# Patient Record
Sex: MALE | Race: WHITE | NOT HISPANIC OR LATINO | Employment: OTHER | URBAN - METROPOLITAN AREA
[De-identification: names, ages, dates, MRNs, and addresses within clinical notes are randomized per-mention and may not be internally consistent; named-entity substitution may affect disease eponyms.]

---

## 2019-09-03 ENCOUNTER — TRANSFERRED RECORDS (OUTPATIENT)
Dept: HEALTH INFORMATION MANAGEMENT | Facility: CLINIC | Age: 74
End: 2019-09-03

## 2019-10-11 ENCOUNTER — TRANSFERRED RECORDS (OUTPATIENT)
Dept: HEALTH INFORMATION MANAGEMENT | Facility: CLINIC | Age: 74
End: 2019-10-11

## 2020-10-08 ENCOUNTER — TRANSFERRED RECORDS (OUTPATIENT)
Dept: HEALTH INFORMATION MANAGEMENT | Facility: CLINIC | Age: 75
End: 2020-10-08

## 2021-03-23 ENCOUNTER — TRANSFERRED RECORDS (OUTPATIENT)
Dept: HEALTH INFORMATION MANAGEMENT | Facility: CLINIC | Age: 76
End: 2021-03-23

## 2021-04-13 ENCOUNTER — TRANSFERRED RECORDS (OUTPATIENT)
Dept: HEALTH INFORMATION MANAGEMENT | Facility: CLINIC | Age: 76
End: 2021-04-13

## 2021-04-21 ENCOUNTER — MEDICAL CORRESPONDENCE (OUTPATIENT)
Dept: HEALTH INFORMATION MANAGEMENT | Facility: CLINIC | Age: 76
End: 2021-04-21

## 2021-04-22 ENCOUNTER — TRANSCRIBE ORDERS (OUTPATIENT)
Dept: OTHER | Age: 76
End: 2021-04-22

## 2021-04-22 DIAGNOSIS — K63.5 POLYP OF ASCENDING COLON: Primary | ICD-10-CM

## 2021-04-26 LAB
ALT SERPL-CCNC: 53 U/L (ref 18–65)
AST SERPL-CCNC: 26 U/L (ref 10–40)
CREAT SERPL-MCNC: 1.3 MG/DL (ref 0.8–1.5)
GLUCOSE SERPL-MCNC: 231 MG/DL (ref 60–99)
POTASSIUM SERPL-SCNC: 4.1 MMOL/L (ref 3.5–5.1)

## 2021-04-27 NOTE — TELEPHONE ENCOUNTER
RECORDS RECEIVED FROM: External  Appt notes: Referred by Angelique Padilla at [facility] for polyp of ascending colon. Appt per Pt.  Images pushed   DATE RECEIVED: 6.2.21   NOTES STATUS DETAILS   OFFICE NOTE from referring provider  In process Angelique Padilla, [facility]  4.13.21   OFFICE NOTE from other specialist   Received 2.24.21 [facility]   DISCHARGE SUMMARY from hospital  N/A    DISCHARGE REPORT from the ER N/A    OPERATIVE REPORT  Received 10.11.19 [facility]   MEDICATION LIST N/A    LABS     PFC TESTING N/A    ANAL PAP N/A    BIOPSIES/PATHOLOGY RELATED TO DIAGNOSIS Received 3.23.21 [facility]  10.8.20 [facility]   DIAGNOSTIC PROCEDURES     COLONOSCOPY Received 10.8.20 [facility]   UPPER ENDOSCOPY (EGD) Received 9.3.19 [facility]   FLEX SIGMOIDOSCOPY  N/A    ERCP N/A    IMAGING (DISC & REPORT)      CT  Received 4.28.21 chest ab pelvis, [facility]  9.3.19 chest ab pelvis, [facility]   MRI N/A    XRAY N/A    ULTRASOUND (ENDOANAL/ENDORECTAL) N/A      Action 4.27.21 MJ   Action Taken Requested all C&R records and images. No images were pushed. [facility] fax number and sent request to [phone]     Action 5.26.21 MJ   Action Taken GI records came from [facility]- updated [employer]. Images pulled into PACS.  In note, stated pt has history of colon cancer. Sent request to [facility] for all colon cancer notes.      Action 6.1.21 MJ   Action Taken Sent URGENT request for all notes related to colon cancer.

## 2021-04-28 ENCOUNTER — TRANSFERRED RECORDS (OUTPATIENT)
Dept: HEALTH INFORMATION MANAGEMENT | Facility: CLINIC | Age: 76
End: 2021-04-28

## 2021-05-05 ENCOUNTER — TRANSFERRED RECORDS (OUTPATIENT)
Dept: HEALTH INFORMATION MANAGEMENT | Facility: CLINIC | Age: 76
End: 2021-05-05

## 2021-05-05 LAB
CHOLEST SERPL-MCNC: 143 MG/DL
HBA1C MFR BLD: 8.9 % (ref 0–5.7)
HDLC SERPL-MCNC: 34 MG/DL
LDLC SERPL CALC-MCNC: 67 MG/DL
TRIGL SERPL-MCNC: 211 MG/DL

## 2021-05-31 NOTE — PROGRESS NOTES
Colon and Rectal Surgery Clinic Note    RE: Da Amaya.  : 1945.  AMILCAR: 2021.    Reason for visit: Endoscopically unresectable Large flat ascending/transverse colon polyp    HPI: 77 yo M with previous hx of ascending colon cancer s/p lap RHC (10/11/2019), seemingly uncomplicated, side to side functional end to end anastomosis. Final path showed pT2N0 (0/23 LNs +).  In 10/20, underwent colonoscopy which revealed a large sessile polyp in the distal descending colon (final path tubular adenoma with focal HGD), resected. Colonoscopy was repeated in 21 which revealed sessile polyps in the descending and transverse colon (resected), and a large unresectable polyp in the ascending colon, endoscopically unresectable (final path all TVA).     He has also had EGDs during this time concomitantly with his colonoscopies, each time also yielding a tubular adenoma in D2, along with GERD and erosive esophagitis. 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    He never had a previous colonoscopy prior to the one in  which showed cancer.     Main symptoms have included diarrhea, was recommended fiber, imodium prn, and protonix. CT CAP was also performed on 21 which revealed no evidence of metastatic disease.    It was recommended he also be evaluated by genetic counselor given his polyp burden and recurrence.     Medical history:  HTN  Hx of tobacco abuse  Cataracts  Elevated PSA  St 3 renal insufficiency  History of MI  Hyperlipidemia  T2DM    Surgical history:  CABx3  Laparoscopic R hemicolectomy 10/11/19  Tonsillectomy  Vasectomy  Appendectomy    Family history:  No family history on file.  Orphan, unknown history     Medications:  Amlodipine  ASA 81 mg  Atorvastatin  Glipizide  Losartan/HCTZ  Metformin  Metoprolol  Pantoprazole  Tamsulosin      Allergies:  Not on File    Social history:   Social History     Tobacco Use     Smoking status: Not on file   Substance Use Topics     Alcohol use: Not on file  "  Former smoker  Occasional EtOH    Marital status: single.    ROS:  A complete review of systems was performed with the patient and all systems negative except as per HPI.    Physical Examination:  BP (!) 153/81 (BP Location: Left arm, Patient Position: Sitting, Cuff Size: Adult Regular)   Pulse 60   Temp 97.8  F (36.6  C) (Oral)   Ht 1.727 m (5' 8\")   Wt 95.3 kg (210 lb 1.6 oz)   SpO2 97%   BMI 31.95 kg/m    General: Well hydrated. No acute distress.  Abdomen: Soft, NT, nondistended. Midline incision well healed, RLQ McBurney from appy well healed.    Laboratory values reviewed:  Recent Labs   Lab Test 04/26/21   CR 1.30   ALT 53   AST 26       Imaging personally reviewed by me:  CT CAP 4/21 without evidence of malignancy.    ASSESSMENT  75 yo M with hx of pT2N0 ascending colon cancer s/p lap RHC, now with recurrence of polyps on surveillance in last 8 months. I am concerned about the presence of a tubular adenoma in the sigmoid colon with showed HGD In October '20's colonoscopy, and of course most recently  The unresectable large centrally depressed polyp in the ascending/proximal transverse (?) colon. Given the number and recurrence of polyps, he may benefit from at least a completion ascending/transverse colectomy, but perhaps more. However, it seems that at least the polyps have been amenable to endoscopic resection with the exception of the ascending colon polyp. The other important aspect to consider is functionality with an ileosigmoid anastomosis, with the increased frequency of bowel movements, which he is already having.    PLAN: Laparoscopic possible open resection of ileocolic/completion transverse colectomy.  1. Complete workup with CEA.   2. Medical oncology referral.  3. Genetic evaluation with counselor.  4. Discussion at MDT thereafter  5. Endocrinology evaluation for diabetes control.    OR Prep  1. Preoperative labs: CBC, CMP, PTT/INR, Prealbumin.  2. Mechanical bowel prep with oral " antibiotics.  3. Hold these medications prior to surgery: Amlodipine, ASA 81 mg, Atorvastatin, Glipizide, Losartan/HCTZ, Metformin  4. Advised patient to begin protein shakes: Premier or Pure protein given high protein, low carb ratio for pre-operative rehab.      Risks, benefits, and alternatives of operative treatment were thoroughly discussed with the patient, he understands these well and agrees to proceed.    Time spent: 45 minutes.  >50% spent in discussing, counseling and coordinating care.    Oscar Muller M.D    Division of Colon and Rectal Surgery  [facility]    Referring Provider:  Wm Humphrey MD  [facility] GASTROENTEROLOGY  [address] [place] [address]     Primary Care Provider:  Toro Worley

## 2021-06-02 ENCOUNTER — PRE VISIT (OUTPATIENT)
Dept: SURGERY | Facility: CLINIC | Age: 76
End: 2021-06-02

## 2021-06-02 ENCOUNTER — OFFICE VISIT (OUTPATIENT)
Dept: SURGERY | Facility: CLINIC | Age: 76
End: 2021-06-02
Attending: INTERNAL MEDICINE
Payer: MEDICARE

## 2021-06-02 ENCOUNTER — TELEPHONE (OUTPATIENT)
Dept: SURGERY | Facility: CLINIC | Age: 76
End: 2021-06-02

## 2021-06-02 VITALS
SYSTOLIC BLOOD PRESSURE: 153 MMHG | DIASTOLIC BLOOD PRESSURE: 81 MMHG | WEIGHT: 210.1 LBS | TEMPERATURE: 97.8 F | HEART RATE: 60 BPM | OXYGEN SATURATION: 97 % | HEIGHT: 68 IN | BODY MASS INDEX: 31.84 KG/M2

## 2021-06-02 DIAGNOSIS — Z86.0100 HISTORY OF COLONIC POLYPS: Primary | ICD-10-CM

## 2021-06-02 DIAGNOSIS — E11.9 TYPE 2 DIABETES MELLITUS WITHOUT COMPLICATION, WITHOUT LONG-TERM CURRENT USE OF INSULIN (H): Primary | ICD-10-CM

## 2021-06-02 DIAGNOSIS — E11.9 TYPE 2 DIABETES MELLITUS WITHOUT COMPLICATION, WITHOUT LONG-TERM CURRENT USE OF INSULIN (H): ICD-10-CM

## 2021-06-02 DIAGNOSIS — C18.2 MALIGNANT NEOPLASM OF ASCENDING COLON (H): ICD-10-CM

## 2021-06-02 DIAGNOSIS — C18.9 COLON CANCER (H): Primary | ICD-10-CM

## 2021-06-02 DIAGNOSIS — Z86.0100 HISTORY OF COLONIC POLYPS: ICD-10-CM

## 2021-06-02 LAB
ALBUMIN SERPL-MCNC: 3.8 G/DL (ref 3.4–5)
ALP SERPL-CCNC: 105 U/L (ref 40–150)
ALT SERPL W P-5'-P-CCNC: 43 U/L (ref 0–70)
ANION GAP SERPL CALCULATED.3IONS-SCNC: 9 MMOL/L (ref 3–14)
AST SERPL W P-5'-P-CCNC: 18 U/L (ref 0–45)
BASOPHILS # BLD AUTO: 0.1 10E9/L (ref 0–0.2)
BASOPHILS NFR BLD AUTO: 1 %
BILIRUB SERPL-MCNC: 0.6 MG/DL (ref 0.2–1.3)
BUN SERPL-MCNC: 28 MG/DL (ref 7–30)
CALCIUM SERPL-MCNC: 9.2 MG/DL (ref 8.5–10.1)
CEA SERPL-MCNC: 1.7 UG/L (ref 0–2.5)
CHLORIDE SERPL-SCNC: 101 MMOL/L (ref 94–109)
CO2 SERPL-SCNC: 28 MMOL/L (ref 20–32)
CREAT SERPL-MCNC: 1.48 MG/DL (ref 0.66–1.25)
DIFFERENTIAL METHOD BLD: NORMAL
EOSINOPHIL # BLD AUTO: 0.1 10E9/L (ref 0–0.7)
EOSINOPHIL NFR BLD AUTO: 2 %
ERYTHROCYTE [DISTWIDTH] IN BLOOD BY AUTOMATED COUNT: 14 % (ref 10–15)
GFR SERPL CREATININE-BSD FRML MDRD: 46 ML/MIN/{1.73_M2}
GLUCOSE SERPL-MCNC: 160 MG/DL (ref 70–99)
HCT VFR BLD AUTO: 41.9 % (ref 40–53)
HGB BLD-MCNC: 13.6 G/DL (ref 13.3–17.7)
IMM GRANULOCYTES # BLD: 0 10E9/L (ref 0–0.4)
IMM GRANULOCYTES NFR BLD: 0.3 %
LYMPHOCYTES # BLD AUTO: 1.9 10E9/L (ref 0.8–5.3)
LYMPHOCYTES NFR BLD AUTO: 26.4 %
MCH RBC QN AUTO: 26.5 PG (ref 26.5–33)
MCHC RBC AUTO-ENTMCNC: 32.5 G/DL (ref 31.5–36.5)
MCV RBC AUTO: 82 FL (ref 78–100)
MONOCYTES # BLD AUTO: 0.9 10E9/L (ref 0–1.3)
MONOCYTES NFR BLD AUTO: 12.4 %
NEUTROPHILS # BLD AUTO: 4.1 10E9/L (ref 1.6–8.3)
NEUTROPHILS NFR BLD AUTO: 57.9 %
NRBC # BLD AUTO: 0 10*3/UL
NRBC BLD AUTO-RTO: 0 /100
PLATELET # BLD AUTO: 199 10E9/L (ref 150–450)
POTASSIUM SERPL-SCNC: 3.7 MMOL/L (ref 3.4–5.3)
PREALB SERPL IA-MCNC: 27 MG/DL (ref 15–45)
PROT SERPL-MCNC: 7.6 G/DL (ref 6.8–8.8)
RBC # BLD AUTO: 5.14 10E12/L (ref 4.4–5.9)
SODIUM SERPL-SCNC: 138 MMOL/L (ref 133–144)
WBC # BLD AUTO: 7 10E9/L (ref 4–11)

## 2021-06-02 PROCEDURE — 99204 OFFICE O/P NEW MOD 45 MIN: CPT | Performed by: SURGERY

## 2021-06-02 PROCEDURE — 80053 COMPREHEN METABOLIC PANEL: CPT | Performed by: PATHOLOGY

## 2021-06-02 PROCEDURE — 84134 ASSAY OF PREALBUMIN: CPT | Performed by: PATHOLOGY

## 2021-06-02 PROCEDURE — 36415 COLL VENOUS BLD VENIPUNCTURE: CPT | Performed by: PATHOLOGY

## 2021-06-02 PROCEDURE — 85025 COMPLETE CBC W/AUTO DIFF WBC: CPT | Performed by: PATHOLOGY

## 2021-06-02 RX ORDER — PANTOPRAZOLE SODIUM 40 MG/1
40 TABLET, DELAYED RELEASE ORAL DAILY
COMMUNITY
Start: 2021-05-21

## 2021-06-02 RX ORDER — ONDANSETRON 4 MG/1
4 TABLET, FILM COATED ORAL EVERY 6 HOURS
Qty: 3 TABLET | Refills: 0 | Status: ON HOLD | OUTPATIENT
Start: 2021-06-02 | End: 2021-06-23

## 2021-06-02 RX ORDER — AMLODIPINE BESYLATE 10 MG/1
10 TABLET ORAL DAILY
Status: ON HOLD | COMMUNITY
Start: 2020-07-31 | End: 2021-07-02

## 2021-06-02 RX ORDER — LOSARTAN POTASSIUM AND HYDROCHLOROTHIAZIDE 25; 100 MG/1; MG/1
1 TABLET ORAL EVERY EVENING
COMMUNITY
Start: 2021-05-13

## 2021-06-02 RX ORDER — GLIPIZIDE 10 MG/1
10 TABLET ORAL
COMMUNITY
Start: 2021-04-25

## 2021-06-02 RX ORDER — NEOMYCIN SULFATE 500 MG/1
1000 TABLET ORAL EVERY 6 HOURS
Qty: 6 TABLET | Refills: 0 | Status: ON HOLD | OUTPATIENT
Start: 2021-06-02 | End: 2021-06-23

## 2021-06-02 RX ORDER — TAMSULOSIN HYDROCHLORIDE 0.4 MG/1
0.4 CAPSULE ORAL DAILY
COMMUNITY
Start: 2021-04-30

## 2021-06-02 RX ORDER — METRONIDAZOLE 500 MG/1
500 TABLET ORAL EVERY 6 HOURS
Qty: 3 TABLET | Refills: 0 | Status: ON HOLD | OUTPATIENT
Start: 2021-06-02 | End: 2021-06-23

## 2021-06-02 RX ORDER — METOPROLOL SUCCINATE 200 MG/1
200 TABLET, EXTENDED RELEASE ORAL DAILY
COMMUNITY
Start: 2021-04-30

## 2021-06-02 RX ORDER — ATORVASTATIN CALCIUM 40 MG/1
40 TABLET, FILM COATED ORAL DAILY
COMMUNITY
Start: 2021-04-30

## 2021-06-02 RX ORDER — POLYETHYLENE GLYCOL 3350 17 G/17G
238 POWDER, FOR SOLUTION ORAL SEE ADMIN INSTRUCTIONS
Qty: 14 PACKET | Refills: 0 | Status: ON HOLD | OUTPATIENT
Start: 2021-06-02 | End: 2021-06-23

## 2021-06-02 SDOH — HEALTH STABILITY: MENTAL HEALTH: HOW OFTEN DO YOU HAVE 6 OR MORE DRINKS ON ONE OCCASION?: NOT ASKED

## 2021-06-02 SDOH — HEALTH STABILITY: MENTAL HEALTH: HOW MANY STANDARD DRINKS CONTAINING ALCOHOL DO YOU HAVE ON A TYPICAL DAY?: NOT ASKED

## 2021-06-02 SDOH — HEALTH STABILITY: MENTAL HEALTH: HOW OFTEN DO YOU HAVE A DRINK CONTAINING ALCOHOL?: MONTHLY OR LESS

## 2021-06-02 ASSESSMENT — PAIN SCALES - GENERAL: PAINLEVEL: NO PAIN (0)

## 2021-06-02 ASSESSMENT — MIFFLIN-ST. JEOR: SCORE: 1667.51

## 2021-06-02 NOTE — PATIENT INSTRUCTIONS
Follow up:    1. Rhiannon will call you to schedule your surgery. If you do not hear from her within three business days please reach out to her.     2. Appointments you will need:   -COVID test  -pre op physical     3. You will need to do a full bowel prep with antibiotics the day before surgery. You will receive a surgery packet in the mail with the instructions.       EZIO Kimble [phone]        Surgery Scheduling [phone]    .

## 2021-06-02 NOTE — LETTER
2021       RE: Da Amaya  [address]  [address]  [facility] [address]     Dear Colleague,    Thank you for referring your patient, Da Amaya, to the [facility] [place] [facility]. Please see a copy of my visit note below.    Colon and Rectal Surgery Clinic Note    RE: Da Amaya.  : 1945.  AMILCAR: 2021.    Reason for visit: Endoscopically unresectable Large flat ascending/transverse colon polyp    HPI: 77 yo M with previous hx of ascending colon cancer s/p lap RHC (10/11/2019), seemingly uncomplicated, side to side functional end to end anastomosis. Final path showed pT2N0 (0/23 LNs +).  In 10/20, underwent colonoscopy which revealed a large sessile polyp in the distal descending colon (final path tubular adenoma with focal HGD), resected. Colonoscopy was repeated in 21 which revealed sessile polyps in the descending and transverse colon (resected), and a large unresectable polyp in the ascending colon, endoscopically unresectable (final path all TVA).     He has also had EGDs during this time concomitantly with his colonoscopies, each time also yielding a tubular adenoma in D2, along with GERD and erosive esophagitis. 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    He never had a previous colonoscopy prior to the one in  which showed cancer.     Main symptoms have included diarrhea, was recommended fiber, imodium prn, and protonix. CT CAP was also performed on 21 which revealed no evidence of metastatic disease.    It was recommended he also be evaluated by genetic counselor given his polyp burden and recurrence.     Medical history:  HTN  Hx of tobacco abuse  Cataracts  Elevated PSA  St 3 renal insufficiency  History of MI  Hyperlipidemia  T2DM    Surgical history:  CABx3  Laparoscopic R hemicolectomy 10/11/19  Tonsillectomy  Vasectomy  Appendectomy    Family history:  No family  "history on file.  Orphan, unknown history     Medications:  Amlodipine  ASA 81 mg  Atorvastatin  Glipizide  Losartan/HCTZ  Metformin  Metoprolol  Pantoprazole  Tamsulosin      Allergies:  Not on File    Social history:   Social History     Tobacco Use     Smoking status: Not on file   Substance Use Topics     Alcohol use: Not on file   Former smoker  Occasional EtOH    Marital status: single.    ROS:  A complete review of systems was performed with the patient and all systems negative except as per HPI.    Physical Examination:  BP (!) 153/81 (BP Location: Left arm, Patient Position: Sitting, Cuff Size: Adult Regular)   Pulse 60   Temp 97.8  F (36.6  C) (Oral)   Ht 1.727 m (5' 8\")   Wt 95.3 kg (210 lb 1.6 oz)   SpO2 97%   BMI 31.95 kg/m    General: Well hydrated. No acute distress.  Abdomen: Soft, NT, nondistended. Midline incision well healed, RLQ McBurney from appy well healed.    Laboratory values reviewed:  Recent Labs   Lab Test 04/26/21   CR 1.30   ALT 53   AST 26       Imaging personally reviewed by me:  CT CAP 4/21 without evidence of malignancy.    ASSESSMENT  77 yo M with hx of pT2N0 ascending colon cancer s/p lap RHC, now with recurrence of polyps on surveillance in last 8 months. I am concerned about the presence of a tubular adenoma in the sigmoid colon with showed HGD In October '20's colonoscopy, and of course most recently  The unresectable large centrally depressed polyp in the ascending/proximal transverse (?) colon. Given the number and recurrence of polyps, he may benefit from at least a completion ascending/transverse colectomy, but perhaps more. However, it seems that at least the polyps have been amenable to endoscopic resection with the exception of the ascending colon polyp. The other important aspect to consider is functionality with an ileosigmoid anastomosis, with the increased frequency of bowel movements, which he is already having.    PLAN: Laparoscopic possible open resection of " ileocolic/completion transverse colectomy.  1. Complete workup with CEA.   2. Medical oncology referral.  3. Genetic evaluation with counselor.  4. Discussion at MDT thereafter  5. Endocrinology evaluation for diabetes control.    OR Prep  1. Preoperative labs: CBC, CMP, PTT/INR, Prealbumin.  2. Mechanical bowel prep with oral antibiotics.  3. Hold these medications prior to surgery: Amlodipine, ASA 81 mg, Atorvastatin, Glipizide, Losartan/HCTZ, Metformin  4. Advised patient to begin protein shakes: Premier or Pure protein given high protein, low carb ratio for pre-operative rehab.      Risks, benefits, and alternatives of operative treatment were thoroughly discussed with the patient, he understands these well and agrees to proceed.    Time spent: 45 minutes.  >50% spent in discussing, counseling and coordinating care.    Oscar Muller M.D    Division of Colon and Rectal Surgery  [facility]    Referring Provider:  Wm Humphrey MD  [facility] GASTROENTEROLOG[facility]  [address] [place] [id number]     Primary Care Provider:  Toro Worley

## 2021-06-02 NOTE — TELEPHONE ENCOUNTER
Told Sd that patient is allergic to neosporin and Dr. Muller felt that patient would still be able to take neomycin.

## 2021-06-02 NOTE — TELEPHONE ENCOUNTER
PRIOR AUTHORIZATION DENIED    Medication: ZOFRAN 4 MG TAB - DENIED    Denial Date: 6/2/2021    Denial Rational: NOT COVERED FOR DX    Appeal Information: FORM ON DENIAL LETTER

## 2021-06-02 NOTE — TELEPHONE ENCOUNTER
Health Call Center    Phone Message    May a detailed message be left on voicemail: yes     Reason for Call: Medication Question or concern regarding medication   Prescription Clarification  Name of Medication: neomycin (MYCIFRADIN) 500 MG tablet  Prescribing Provider: Oscar Muller   Pharmacy: [id number] - [place]   What on the order needs clarification? Sd from [facility] is calling in about the prescription from Dr. Muller, because he has the patient flagged as being allergic to neomycin. Please call back to discuss.          Action Taken: Message routed to:  [facility] ([facility] Colon/Rectal    Travel Screening: Not Applicable

## 2021-06-03 ENCOUNTER — TELEPHONE (OUTPATIENT)
Dept: ENDOCRINOLOGY | Facility: CLINIC | Age: 76
End: 2021-06-03

## 2021-06-03 ENCOUNTER — DOCUMENTATION ONLY (OUTPATIENT)
Dept: SURGERY | Facility: CLINIC | Age: 76
End: 2021-06-03

## 2021-06-03 DIAGNOSIS — C18.9 COLON CANCER (H): Primary | ICD-10-CM

## 2021-06-03 NOTE — PROGRESS NOTES
Action 06/03/2021 JTV 10:53AM   Action Taken AFIA called patient and confirmed that he was seen at [facility]. Patient gave verbal consent to obtain any medical records.     Per Provider's request, AFIA reached out to [facility]'s HIM team and spoke to Aliya regarding pathology slides and all records pertaining to colon cancer. Aliya states that they received a request but it was blank. Fax number provided by Aliya is [phone]. Urgent request and shipping label was sent to fax number that was provided.     Tracking number: [id number]     Action 06/04/2021 JTV 10:09AM   Action Cathy OREILLY received Medical Record Reports from [facility] through fax. Imaging department is having issues and cannot be attached to patient's chart yet. E-mail with medical records was forwarded  to Nurse Kimble in Colon and rectal. AFIA is still waiting for Pathology slides from [facility].     Action 06/08/2021 jTV 3:59PM   Action Cathy OREILLY checked tracking number for [employer] and the pathology slides from [facility] should be in by Wednesday 06/09/2021 night.    Tracking number: [id number]

## 2021-06-03 NOTE — TELEPHONE ENCOUNTER
[facility] Call Center    Phone Message    May a detailed message be left on voicemail: yes     Reason for Call: Other: Patient is being referred by Dr Muller to be seen for type 2 DM. Per referral, last a1c was 9- we would like his diabetes under control in prep for colon surgery. Writer contacted patient to schedule but patient stated that he will be in the building on 7/5/21 at 1:00pm. Patient wondering if Endocrinology would be able to see patient on 7/5/21 as he will already be in the building. Sending encounter to clinic per patient's request. Please review and call patient to schedule.     Action Taken: Message routed to:  [facility] ([facility] Endocrinology    Travel Screening: Not Applicable

## 2021-06-07 ENCOUNTER — TELEPHONE (OUTPATIENT)
Dept: SURGERY | Facility: CLINIC | Age: 76
End: 2021-06-07

## 2021-06-07 ENCOUNTER — TUMOR CONFERENCE (OUTPATIENT)
Dept: ONCOLOGY | Facility: CLINIC | Age: 76
End: 2021-06-07
Payer: MEDICARE

## 2021-06-07 DIAGNOSIS — Z11.59 ENCOUNTER FOR SCREENING FOR OTHER VIRAL DISEASES: ICD-10-CM

## 2021-06-07 PROBLEM — C18.9 COLON CANCER (H): Status: ACTIVE | Noted: 2021-06-07

## 2021-06-07 NOTE — TELEPHONE ENCOUNTER
RECORDS STATUS - ALL OTHER DIAGNOSIS      RECORDS RECEIVED FROM: Malignant neoplasm of ascending colon ref Dr. Muller   DATE RECEIVED: 7.13.21   NOTES STATUS DETAILS   OFFICE NOTE from referring provider Internal 6.2.21 Dr Oscar Muller, [facility] Colon Surg   OFFICE NOTE from medical oncologist Received 4.13.21 Angelique Padilla [facility]  2.24.21 [facility]   DISCHARGE SUMMARY from hospital     DISCHARGE REPORT from the ER     OPERATIVE REPORT Received 10.8.20 [facility] - Colonoscopy  10.11.19 [facility]   MEDICATION LIST Internal    CLINICAL TRIAL TREATMENTS TO DATE     LABS     PATHOLOGY REPORTS Received 3.23.21 [facility]  10.8.20 [facility]   ANYTHING RELATED TO DIAGNOSIS     GENONOMIC TESTING     TYPE:     IMAGING (NEED IMAGES & REPORT)     CT SCANS Received 4.28.21 chest ab pelvis, [facility]  9.3.19 chest ab pelvis, [facility]   MRI     MAMMO     ULTRASOUND     PET

## 2021-06-07 NOTE — PROGRESS NOTES
Tumor Conference Information  Tumor Conference: Colorectal  Specialties Present: Medical oncology, Radiation oncology, Radiology, Surgery  Patient Status: A new patient  Pathology: Not Discussed  Treatment to Date: None  Clinical Trial Eligibility: Not discussed  Recommended Plan: Surgery, Other (see comment) (Comment: ADRIANA)  Did the review exceed 30 minutes?: did not           Documentation / Disclaimer Cancer Tumor Board Note  Cancer tumor board recommendations do not override what is determined to be reasonable care and treatment, which is dependent on the circumstances of a patient's case; the patient's medical, social, and personal concerns; and the clinical judgment of the oncologist [physician].

## 2021-06-07 NOTE — TELEPHONE ENCOUNTER
Case Request received to schedule a Tier 2 surgery admit 240 min case with Dr. Oscar Muller. Requested dates June 22 or 25. Cancer Dx.    Additional Instructions for the Case  Multi-surgeon case:  no  Time in minutes:  240  Anesthesia: general  Prep: full prep with antibiotics   Pre-Op: PAC (only video visits available; patient has no video capability, so scheduled with NP Clinic instead; Dr. Muller updated)  Labs: being done today 6/2  WOC: no  Special equipment: no  Special Instructions: no    Schedule with Radha Barnes NP 2-3 weeks after surgery.  Schedule with Surgeon 3-4 weeks after Radha Barnes NP    Spoke with patient via phone, completed the following scheduling, then mailed Surgery Packet to patient at PO Box on file without street address, as per patient request:     Required: Yes, you will need a  18 years or older to drive you home from your procedure as well as stay with you for 24 hours after your procedure, if you are discharged the same day as your procedure.     Surgery: Laparoscopic transverse colectomy     Date: Tuesday June 22, 2021                      Surgeon(s): Dr. Oscar Muller     Time: You will receive a call 2-3 days prior to your surgery with your finalized surgery and arrival time.      Location:     [facility]      [facility] [address])      [place] [address]      [phone]      [email or web address]      Upcoming / Related Appointments:   To ensure you are fully prepared for your surgery, please make sure the following items have been completed PRIOR to your surgery date:     Pre-operative Lab appointment:   Lab draw appointment:                                   6/2/2021 at 2:45 PM (already completed)     Pre-operative History & Physical appointment:   Clinic appointment with Angie Reynoso NP:     6/18/2021 at 1:00  PM                                                                                  [facility]                                                                                  [address] [place] [address]   Pre-operative COVID-19 Test:   Pre-procedure asymptomatic COVID PCR    6/18/2021 at 2:15 PM                                                                                  [facility]                                                                                  [address] [place] [address]  Oncology appointment:  Clinic appointment with Clarissa South MD: 7/5/2021 at 1:00 PM                                                                                 [facility]                                                                                [address] [place] [address]  Post operative appointment:  Clinic appointment with Radha Everett NP: 7/5/2021 at 2:15 PM                                                                                 [facility]                                                                                [address] [place] [address]     Post operative appointment:  Clinic appointment with Dr. Oscar Muller: 8/11/2021 at 11:30 AM                                                                                 [facility]                                                                                [address] [place] [address]     Pre-surgical Preparation:   "     MiraLAX/Gatorade, Magnesium Citrate, Antibiotics, Zofran  - see \"Day Before Surgery\"  - obtain medications and ingredients in advance  - if you have diabetes or blood sugar concerns, please use Gatorade ZERO or Low Sugar, as per recommendation by your physician      "

## 2021-06-09 ENCOUNTER — VIRTUAL VISIT (OUTPATIENT)
Dept: ONCOLOGY | Facility: CLINIC | Age: 76
End: 2021-06-09
Attending: SURGERY
Payer: MEDICARE

## 2021-06-09 DIAGNOSIS — C18.2 CANCER OF ASCENDING COLON (H): Primary | ICD-10-CM

## 2021-06-09 DIAGNOSIS — K63.5 COLON POLYPOSIS: ICD-10-CM

## 2021-06-09 DIAGNOSIS — Z78.9 FAMILY HISTORY NOT KNOWN DUE TO ADOPTION: ICD-10-CM

## 2021-06-09 DIAGNOSIS — Z11.59 ENCOUNTER FOR SCREENING FOR OTHER VIRAL DISEASES: ICD-10-CM

## 2021-06-09 PROCEDURE — 96040 HC GENETIC COUNSELING, EACH 30 MINUTES: CPT | Mod: TEL | Performed by: GENETIC COUNSELOR, MS

## 2021-06-09 NOTE — TELEPHONE ENCOUNTER
We have no in-person openings on July 5th. Pt can schedule 1st available virtual or in-person per their preference.

## 2021-06-09 NOTE — LETTER
6/9/2021         RE: Da Amaya  [address]  [address]  [facility] [address]        Dear Colleague,    Thank you for referring your patient, Da Amaya, to the [facility]. Please see a copy of my visit note below.    6/9/2021    Da is a 76 year old who is being evaluated via a billable telephone visit.      What phone number would you like to be contacted at? [phone]  How would you like to obtain your AVS? Mail a copy    Phone call duration: 45 minutes    Referring Provider: Oscar Muller MD    Presenting Information:   Given concerns regarding the potential for COVID-19 exposure during a clinic visit, Da elected for a telephone genetic counseling visit through the Cancer Risk Management Program to discuss his personal history of colon cancer and polyps. We reviewed this history, cancer screening recommendations, and available genetic testing options.    Personal History:  Da is a 76 year old male. 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 This colonoscopy also identified nine tubular adenomas/tubulovillous adenomas and one hyperplastic polyp.     His next colonoscopy in October 2020 and upper endoscopy in March 2021 identified two tubular adenomas of the colon (one with high grade dysplasia) and two tubular adenoma in the duodenum. His colonoscopy in March 2021 identified one hyperplastic polyp, one tubular adenoma, and one tubulovillous adenoma that was not resectable. He is currently working with Dr. Muller to plan surgery to remove the polyp. Per Dr. Muller, Da's genetic testing results may inform these surgery decisions.    He does not regularly do any other cancer screening at this time.    Family History: (Please see scanned pedigree for detailed family history information)    Da reports that he was adopted and has  no information regarding his biological relatives.    His ancestry may be Singaporean and there is no known Ashkenazi Buddhist ancestry on either side of his family.    Discussion:    Da's personal history of colon cancer and colon polyps may be suggestive of a hereditary cancer syndrome.    We reviewed the features of sporadic, familial, and hereditary cancers. In looking at Da's history, it is possible that a cancer susceptibility gene is present as he has been diagnosed with colon cancer and at least 14 adenomatous colon polyps. Though his age at diagnosis of colon cancer is typical, which reduces the chance for a hereditary cancer syndrome, it should be noted that Da has no information regarding his biological relatives. This may cause an underestimation of the chance for an inherited cancer syndrome in his family.    Da first explained that he was under the impression he had already undergone genetic testing. Per Da, he was contacted by a nurse at [facility] this morning, who shared that his genetic testing was negative/normal. Da could not recall the details of the genetic testing, nor did he have a copy of the test report. I explained that I will reach out to his care team at [facility] regarding this testing, in the event this information alters my testing recommendations. As Dr. Muller has recommended that testing be performed STAT for surgical decisions, though, I offered to continue our visit under the assumption testing still needs to be ordered. Da verbalized agreement with this plan.     We then discussed the natural history and genetics of hereditary colon cancer/polyps, including Carty syndrome and Familial Adenomatous Polyposis (FAP).     Carty syndrome is the most common cause of hereditary colon cancer and is associated with mutations in one of five genes: MLH1, MSH2, MSH6, PMS2, and EPCAM. The highest cancer risks associated with Carty syndrome include colon, endometrial/uterine,  gastric, and ovarian cancer. Other cancers have also been reported with Carty syndrome.     We also reviewed FAP, given Da's polyp history. FAP is caused by mutations in the APC gene and typically presents with many (more than 100) adenoma type polyps in the colon and significantly increased risk for colon and other cancers. Attenuated FAP (AFAP) is also a condition caused by mutations in the APC gene. It is milder than classic FAP, however, as it typically presents wit  polyps and the lifetime risk of colon cancer is lower at approximately 70%.     We discussed that there are additional genes that could cause increased risk for colon cancer and polyps. As many of these genes present with overlapping features in a family and accurate cancer risk cannot always be established based upon the pedigree analysis alone, it would be reasonable for Da to consider panel genetic testing to analyze multiple genes at once.    Based on his personal history, Da meets current National Comprehensive Cancer Network (NCCN) criteria for genetic testing of the polyposis genes (i.e. APC, AXIN2, MUTYH, NTHL1, MSH3, GREM1, POLD1, POLE, etc.).      A detailed handout regarding these genes/syndromes and the information we discussed will be mailed to Da and can be found in the after visit summary. Topics included: inheritance pattern, cancer risks, cancer screening recommendations, and also risks, benefits and limitations of testing.    Da explained that he would be interested in genetic testing if the results would be used by his physicians to inform upcoming treatment decisions. As such, we discussed that genetic testing is available for 20 genes associated with increased risk for colon cancer: Invite Colorectal Cancer Panel (APC, AXIN2, BMPR1A, CDH1, CHEK2, EPCAM, GREM1, MLH1, MSH2, MSH3, MSH6, MUTYH, NTHL1, PMS2, POLD1, POLE, PTEN, SMAD4, STK11, and TP53).     We discussed that some of the genes in this panel are  associated with specific hereditary cancer syndromes and have published management guidelines: Carty syndrome (MLH1, MSH2, MSH6, PMS2, EPCAM), Familial Adenomatous Polyposis (APC), MUTYH Associated Polyposis (MUTYH), Juvenile Polyposis syndrome (SMAD4, BMPR1A), Peutz-Jeghers syndrome (STK11), Cowden syndrome (PTEN), Li Fraumeni syndrome (TP53), and Hereditary Diffuse Gastric Cancer (CDH1).     The AXIN2, CHEK2, GREM1, MSH3, NTHL1, POLD1, and POLE genes have also been associated with increased colon cancer risk and have published management guidelines.    Consent was obtained over the phone. Da elected to have a saliva collection kit shipped to his home, as [facility] clinics are local to him for a blood draw. Once his saliva sample is collected, genetic testing using the [facility] Colorectal Cancer Panel will be sent to [facility]. Turn around time: 2-3 weeks after [employer] receives his saliva sample.    Medical Management: For Da, we reviewed that the information from genetic testing may determine:    surgery to treat Da's unresectable colon polyp (i.e. partial versus total colectomy),    additional cancer screening for which Da may qualify (i.e. more frequent colonoscopies, regular upper endoscopies, annual urinalysis, annual thyroid ultrasounds, more frequent dermatologic exams, etc.),    and targeted chemotherapies if he were to develop certain cancers in the future (i.e. immunotherapy for individuals with Carty syndrome, etc.).     These recommendations and possible targeted chemotherapies will be discussed in detail once genetic testing is completed.     Plan:  1) Today Da elected to proceed with genetic testing using the [facility]ae Colorectal Cancer Panel offered by [facility]. A saliva collection kit will be shipped to his home.  2) The results should be available 2-3 weeks after [employer] receives his saliva sample.  3) I will contact Da by phone to discuss the results. I will  also contact Da if I am able to obtain a copy of his previous genetic testing results, in the event that information changes the above plan.    Sangeeta Marcum MS, [facility] [place]  Licensed, Certified Genetic Counselor  Office: [phone]  Pager: [phone]    Addendum: I contacted the clinic at [facility] on 6/9/2021 and was faxed a copy of his genetic testing results. Per these records, Da pursued testing using the [employer] panel offered by [employer] in April 2021. This testing identified two variants of uncertain significance in the AXIN2 gene (c.1168A>G) and BRCA2 gene (c.3122G>C). No mutations or variants were identified in the other genes included on the test: BRCA1, MLH1, MSH2, MSH6, PMS2, EPCAM, APC, MUTYH, CDKN2A, CDK4, TP53, PTEN, STK11, CDH1, BMPR1A, SMAD4, PALB2, CHEK2, TANYA, NBN, BARD1, BRIP1, RAD51C, RAD51D, POLD1, POLE, GREM1, HOXB13, MSH3, NTHL1, RNF43, GALNT12, and RPS20.    We reviewed that this result means changes (variants) were identified in the BRCA2 and AXIN2 genes, but there is limited and/or conflicting evidence regarding any potential cancer risk associated with these genetic changes. Given the uncertain significance of this result, medical management decisions should NOT be made based on this test result alone. If he has further questions regarding these results, I encouraged him to contact the provider that ordered his test, as well.    The laboratory is working to determine if any of these variant are harmful or benign, and they will contact the provider that ordered Da's test if they are reclassified. In the meantime, we discussed that Da should continue to follow all colon cancer/polyp treatment and follow-up recommendations as made by Dr. Muller and his other medical providers. Other general population cancer screening would also be appropriate for Da.    We then discussed that this myRisk panel is comprehensive and includes the genes associated with increased colon  cancer/polyp risk that would have been included in the Invitae panel we previously discussed. As such, no additional genetic testing is indicated for Da at this time. Da felt comfortable cancelling his Invitae test given this information.    I encouraged Da to contact me regularly and if there are any changes to his personal/family history of cancer. These updates may inform future genetic testing and/or cancer screening recommendations. Da verbalized understanding and denied having any other questions at this time.    Plan:  1. We reviewed Da's myRisk panel results, ordered by his physician at [facility]. I offered to share this information with Dr. Muller, in the event these results influence Da's upcoming surgery.  2. Given that comprehensive testing has already been performed, Da felt comfortable cancelling the Invitae panel we discussed earlier this morning.  3. I encouraged Da to contact me regularly and if there are any changes to his personal/family history of cancer.    Sangeeta Marcum MS, [facility] [place]  Licensed, Certified Genetic Counselor  Office: [phone]  Pager: [phone]      Again, thank you for allowing me to participate in the care of your patient.        Sincerely,        Sangeeta Marcum, GC

## 2021-06-09 NOTE — PROGRESS NOTES
6/9/2021    Da is a 76 year old who is being evaluated via a billable telephone visit.      What phone number would you like to be contacted at? [phone]  How would you like to obtain your AVS? Mail a copy    Phone call duration: 45 minutes    Referring Provider: Oscar Muller MD    Presenting Information:   Given concerns regarding the potential for COVID-19 exposure during a clinic visit, Da elected for a telephone genetic counseling visit through the Cancer Risk Management Program to discuss his personal history of colon cancer and polyps. We reviewed this history, cancer screening recommendations, and available genetic testing options.    Personal History:  Da is a 76 year old male. 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 This colonoscopy also identified nine tubular adenomas/tubulovillous adenomas and one hyperplastic polyp.     His next colonoscopy in October 2020 and upper endoscopy in March 2021 identified two tubular adenomas of the colon (one with high grade dysplasia) and two tubular adenoma in the duodenum. His colonoscopy in March 2021 identified one hyperplastic polyp, one tubular adenoma, and one tubulovillous adenoma that was not resectable. He is currently working with Dr. Muller to plan surgery to remove the polyp. Per Dr. Muller, Da's genetic testing results may inform these surgery decisions.    He does not regularly do any other cancer screening at this time.    Family History: (Please see scanned pedigree for detailed family history information)    Da reports that he was adopted and has no information regarding his biological relatives.    His ancestry may be Chinese and there is no known Ashkenazi Mu-ism ancestry on either side of his family.    Discussion:    Da's personal history of colon cancer and colon polyps may be suggestive of a  hereditary cancer syndrome.    We reviewed the features of sporadic, familial, and hereditary cancers. In looking at Da's history, it is possible that a cancer susceptibility gene is present as he has been diagnosed with colon cancer and at least 14 adenomatous colon polyps. Though his age at diagnosis of colon cancer is typical, which reduces the chance for a hereditary cancer syndrome, it should be noted that Da has no information regarding his biological relatives. This may cause an underestimation of the chance for an inherited cancer syndrome in his family.    Da first explained that he was under the impression he had already undergone genetic testing. Per Da, he was contacted by a nurse at [facility] this morning, who shared that his genetic testing was negative/normal. Da could not recall the details of the genetic testing, nor did he have a copy of the test report. I explained that I will reach out to his care team at [facility] regarding this testing, in the event this information alters my testing recommendations. As Dr. Muller has recommended that testing be performed STAT for surgical decisions, though, I offered to continue our visit under the assumption testing still needs to be ordered. Da verbalized agreement with this plan.     We then discussed the natural history and genetics of hereditary colon cancer/polyps, including Carty syndrome and Familial Adenomatous Polyposis (FAP).     Carty syndrome is the most common cause of hereditary colon cancer and is associated with mutations in one of five genes: MLH1, MSH2, MSH6, PMS2, and EPCAM. The highest cancer risks associated with Carty syndrome include colon, endometrial/uterine, gastric, and ovarian cancer. Other cancers have also been reported with Carty syndrome.     We also reviewed FAP, given Da's polyp history. FAP is caused by mutations in the APC gene and typically presents with many (more than 100) adenoma type polyps in the  colon and significantly increased risk for colon and other cancers. Attenuated FAP (AFAP) is also a condition caused by mutations in the APC gene. It is milder than classic FAP, however, as it typically presents wit  polyps and the lifetime risk of colon cancer is lower at approximately 70%.     We discussed that there are additional genes that could cause increased risk for colon cancer and polyps. As many of these genes present with overlapping features in a family and accurate cancer risk cannot always be established based upon the pedigree analysis alone, it would be reasonable for Da to consider panel genetic testing to analyze multiple genes at once.    Based on his personal history, Da meets current National Comprehensive Cancer Network (NCCN) criteria for genetic testing of the polyposis genes (i.e. APC, AXIN2, MUTYH, NTHL1, MSH3, GREM1, POLD1, POLE, etc.).      A detailed handout regarding these genes/syndromes and the information we discussed will be mailed to Da and can be found in the after visit summary. Topics included: inheritance pattern, cancer risks, cancer screening recommendations, and also risks, benefits and limitations of testing.    Da explained that he would be interested in genetic testing if the results would be used by his physicians to inform upcoming treatment decisions. As such, we discussed that genetic testing is available for 20 genes associated with increased risk for colon cancer: Invite Colorectal Cancer Panel (APC, AXIN2, BMPR1A, CDH1, CHEK2, EPCAM, GREM1, MLH1, MSH2, MSH3, MSH6, MUTYH, NTHL1, PMS2, POLD1, POLE, PTEN, SMAD4, STK11, and TP53).     We discussed that some of the genes in this panel are associated with specific hereditary cancer syndromes and have published management guidelines: Carty syndrome (MLH1, MSH2, MSH6, PMS2, EPCAM), Familial Adenomatous Polyposis (APC), MUTYH Associated Polyposis (MUTYH), Juvenile Polyposis syndrome (SMAD4, BMPR1A),  Peutz-Jeghers syndrome (STK11), Cowden syndrome (PTEN), Li Fraumeni syndrome (TP53), and Hereditary Diffuse Gastric Cancer (CDH1).     The AXIN2, CHEK2, GREM1, MSH3, NTHL1, POLD1, and POLE genes have also been associated with increased colon cancer risk and have published management guidelines.    Consent was obtained over the phone. Da elected to have a saliva collection kit shipped to his home, as no [facility] clinics are local to him for a blood draw. Once his saliva sample is collected, genetic testing using the [employer] Colorectal Cancer Panel will be sent to [email or web address]. Turn around time: 2-3 weeks after [employer] receives his saliva sample.    Medical Management: For Da, we reviewed that the information from genetic testing may determine:    surgery to treat Da's unresectable colon polyp (i.e. partial versus total colectomy),    additional cancer screening for which Da may qualify (i.e. more frequent colonoscopies, regular upper endoscopies, annual urinalysis, annual thyroid ultrasounds, more frequent dermatologic exams, etc.),    and targeted chemotherapies if he were to develop certain cancers in the future (i.e. immunotherapy for individuals with Carty syndrome, etc.).     These recommendations and possible targeted chemotherapies will be discussed in detail once genetic testing is completed.     Plan:  1) Today Da elected to proceed with genetic testing using the Invitae Colorectal Cancer Panel offered by [email or web address]. A saliva collection kit will be shipped to his home.  2) The results should be available 2-[facility].  3) I will contact Da by phone to discuss the results. I will also contact Da if I am able to obtain a copy of his previous genetic testing results, in the event that information changes the above plan.    Sangeeta Marcum MS, [facility] [place]  Licensed, Certified Genetic Counselor  Office: [phone]  Pager:  [phone]    Addendum: I contacted the clinic at [facility] on 6/9/2021 and was faxed a copy of his genetic testing results. Per these records, Da pursued testing using the [employer] panel offered by [employer] in April 2021. This testing identified two variants of uncertain significance in the AXIN2 gene (c.1168A>G) and BRCA2 gene (c.3122G>C). No mutations or variants were identified in the other genes included on the test: BRCA1, MLH1, MSH2, MSH6, PMS2, EPCAM, APC, MUTYH, CDKN2A, CDK4, TP53, PTEN, STK11, CDH1, BMPR1A, SMAD4, PALB2, CHEK2, TANYA, NBN, BARD1, BRIP1, RAD51C, RAD51D, POLD1, POLE, GREM1, HOXB13, MSH3, NTHL1, RNF43, GALNT12, and RPS20.    We reviewed that this result means changes (variants) were identified in the BRCA2 and AXIN2 genes, but there is limited and/or conflicting evidence regarding any potential cancer risk associated with these genetic changes. Given the uncertain significance of this result, medical management decisions should NOT be made based on this test result alone. If he has further questions regarding these results, I encouraged him to contact the provider that ordered his test, as well.    The laboratory is working to determine if any of these variant are harmful or benign, and they will contact the provider that ordered Da's test if they are reclassified. In the meantime, we discussed that Da should continue to follow all colon cancer/polyp treatment and follow-up recommendations as made by Dr. Muller and his other medical providers. Other general population cancer screening would also be appropriate for Da.    We then discussed that this myRisk panel is comprehensive and includes the genes associated with increased colon cancer/polyp risk that would have been included in the Invitae panel we previously discussed. As such, no additional genetic testing is indicated for Da at this time. Da felt comfortable cancelling his Invitae test given this information.    I  encouraged Da to contact me regularly and if there are any changes to his personal/family history of cancer. These updates may inform future genetic testing and/or cancer screening recommendations. Da verbalized understanding and denied having any other questions at this time.    Plan:  1. We reviewed Da's myRisk panel results, ordered by his physician at [facility]. I offered to share this information with Dr. uMller, in the event these results influence Da's upcoming surgery.  2. Given that comprehensive testing has already been performed, Da felt comfortable cancelling the Invitae panel we discussed earlier this morning.  3. I encouraged Da to contact me regularly and if there are any changes to his personal/family history of cancer.    Sangeeta Marcum MS, [facility] [place]  Licensed, Certified Genetic Counselor  Office: [phone]  Pager: [phone]

## 2021-06-10 ENCOUNTER — TELEPHONE (OUTPATIENT)
Dept: SURGERY | Facility: CLINIC | Age: 76
End: 2021-06-10

## 2021-06-10 NOTE — TELEPHONE ENCOUNTER
----- Message from Rhiannon King sent at 6/9/2021  6:32 PM CDT -----  Regarding: Will try to move up from 7/6 back to 6/22  Hi Barbara Davis Cyrus, Megan,    As discussed w Dr. Oscar Muller via phone this afternoon (after OR scheduling was already gone for the day), since genetic testing was received earlier than anticipated, we will try to move patient's case back up again to a sooner date. Back to 6/22 if possible.     I did not even get a chance to talk to patient today to reschedule related appts, so if he can keep them, then he can proceed with 6/22 if we can get him moved back.    Susan or Barbara, please call OR scheduling to try to get case moved back to 6/22 in same block as Dr. Muller's other case at [facility] that date. Please also confirm with patient.    Thank-you!  Rhiannon  ----- Message -----  From: Oscar Muller MD  Sent: 6/8/2021   2:49 PM CDT  To: Rhiannon King, Shyanne Singh, SARAH Mcgovern, I know Mr. Amaya is scheduled for the 22nd, but I won't be able to proceed with surgery until his genetic testing, CRIMP testing, comes back. It will tell me how much colon I should be taking out. He knows this. I spoke with him for 30 minutes yesterday. Thanks.

## 2021-06-10 NOTE — TELEPHONE ENCOUNTER
Called patient to confirm surgery date of 6/22 with Dr. Muller at [facility].     Patient was not aware that surgery had moved to 7/6 and back to 6/22.    Explained that a packet was sent to him on 6/7 with all dates and times for appointments/surgery and to follow everything on the packet.     He confirmed that he will, and confirmed surgery on 6/22.    No further questions.

## 2021-06-10 NOTE — TELEPHONE ENCOUNTER
Attempted to reach patient in regards to moving procedure back up to 6/22. Unable to leave VM as phone kept ringing. Will send message for follow-up tomorrow.

## 2021-06-14 PROCEDURE — 88321 CONSLTJ&REPRT SLD PREP ELSWR: CPT | Mod: GC | Performed by: PATHOLOGY

## 2021-06-14 PROCEDURE — 999N001032 HC STATISTIC REVIEW OUTSIDE SLIDES TC 88321

## 2021-06-16 LAB — COPATH REPORT: NORMAL

## 2021-06-17 ENCOUNTER — TEAM CONFERENCE (OUTPATIENT)
Dept: SURGERY | Facility: CLINIC | Age: 76
End: 2021-06-17

## 2021-06-17 ENCOUNTER — TELEPHONE (OUTPATIENT)
Dept: ENDOCRINOLOGY | Facility: CLINIC | Age: 76
End: 2021-06-17

## 2021-06-17 ENCOUNTER — PATIENT OUTREACH (OUTPATIENT)
Dept: SURGERY | Facility: CLINIC | Age: 76
End: 2021-06-17

## 2021-06-17 DIAGNOSIS — E11.9 TYPE 2 DIABETES MELLITUS WITHOUT COMPLICATION, WITHOUT LONG-TERM CURRENT USE OF INSULIN (H): Primary | ICD-10-CM

## 2021-06-17 NOTE — TELEPHONE ENCOUNTER
Action Needed --  I've placed a hold that needs scheduling. Please see below.  Department: Endocrine  NEW  Provider: Marysol  Date/Time: Friday 6/18/21 2:30 PM   RFV: Type 2 diabetes.sign

## 2021-06-17 NOTE — PROGRESS NOTES
COLON AND RECTAL SURGERY HUDDLE:    Patient was reviewed in preporation for their surgery the following was reviewed and has been completed:    Surgeon: Dr. Oscar Muller    Surgery & Date: 6/22/2021 lap transverse colectomy     Last MD Note: reviewed    Anesthesia Type: General    Other Providers: No    PAC: Yes    WOC: N/A    Labs: Yes    Bowel Prep: Yes MiraLAX / Gatorade , Antibiotic and Magnesium Citrate    Packet: Yes    Imaging: N/A    Post-Op Appointments: Yes    COVID: yes    pt with non diagnostic ED workup , will admit to tele  offered detox, pt refused

## 2021-06-17 NOTE — PROGRESS NOTES
Yevgeniy is able to see the patient tomorrow at 2:30pm and they can collect is a1c. I updated patient about pre op at 1pm COVID test at 2:15pm and endocrine visit at 2:30pm

## 2021-06-18 ENCOUNTER — PRE VISIT (OUTPATIENT)
Dept: ENDOCRINOLOGY | Facility: CLINIC | Age: 76
End: 2021-06-18

## 2021-06-18 ENCOUNTER — OFFICE VISIT (OUTPATIENT)
Dept: FAMILY MEDICINE | Facility: CLINIC | Age: 76
End: 2021-06-18
Payer: MEDICARE

## 2021-06-18 ENCOUNTER — OFFICE VISIT (OUTPATIENT)
Dept: ENDOCRINOLOGY | Facility: CLINIC | Age: 76
End: 2021-06-18
Payer: MEDICARE

## 2021-06-18 VITALS
HEIGHT: 68 IN | HEART RATE: 66 BPM | WEIGHT: 221 LBS | SYSTOLIC BLOOD PRESSURE: 173 MMHG | BODY MASS INDEX: 33.49 KG/M2 | DIASTOLIC BLOOD PRESSURE: 78 MMHG

## 2021-06-18 VITALS
BODY MASS INDEX: 31.83 KG/M2 | DIASTOLIC BLOOD PRESSURE: 83 MMHG | TEMPERATURE: 98 F | WEIGHT: 210 LBS | HEIGHT: 68 IN | OXYGEN SATURATION: 97 % | SYSTOLIC BLOOD PRESSURE: 174 MMHG | RESPIRATION RATE: 16 BRPM | HEART RATE: 61 BPM

## 2021-06-18 DIAGNOSIS — E78.5 DYSLIPIDEMIA: ICD-10-CM

## 2021-06-18 DIAGNOSIS — C18.9 MALIGNANT NEOPLASM OF COLON, UNSPECIFIED PART OF COLON (H): ICD-10-CM

## 2021-06-18 DIAGNOSIS — E11.9 TYPE 2 DIABETES MELLITUS WITHOUT COMPLICATION, WITHOUT LONG-TERM CURRENT USE OF INSULIN (H): ICD-10-CM

## 2021-06-18 DIAGNOSIS — Z79.4 TYPE 2 DIABETES MELLITUS WITH HYPERGLYCEMIA, WITH LONG-TERM CURRENT USE OF INSULIN (H): Primary | ICD-10-CM

## 2021-06-18 DIAGNOSIS — Z01.818 PREOP GENERAL PHYSICAL EXAM: Primary | ICD-10-CM

## 2021-06-18 DIAGNOSIS — N18.31 STAGE 3A CHRONIC KIDNEY DISEASE (H): ICD-10-CM

## 2021-06-18 DIAGNOSIS — C18.2 MALIGNANT NEOPLASM OF ASCENDING COLON (H): ICD-10-CM

## 2021-06-18 DIAGNOSIS — I25.810 CORONARY ARTERY DISEASE INVOLVING OTHER CORONARY ARTERY BYPASS GRAFT WITHOUT ANGINA PECTORIS: ICD-10-CM

## 2021-06-18 DIAGNOSIS — N40.0 BENIGN PROSTATIC HYPERPLASIA, UNSPECIFIED WHETHER LOWER URINARY TRACT SYMPTOMS PRESENT: ICD-10-CM

## 2021-06-18 DIAGNOSIS — E11.22 TYPE 2 DIABETES MELLITUS WITH STAGE 3A CHRONIC KIDNEY DISEASE, UNSPECIFIED WHETHER LONG TERM INSULIN USE (H): ICD-10-CM

## 2021-06-18 DIAGNOSIS — I10 BENIGN ESSENTIAL HYPERTENSION: ICD-10-CM

## 2021-06-18 DIAGNOSIS — N18.31 TYPE 2 DIABETES MELLITUS WITH STAGE 3A CHRONIC KIDNEY DISEASE, UNSPECIFIED WHETHER LONG TERM INSULIN USE (H): ICD-10-CM

## 2021-06-18 DIAGNOSIS — Z11.59 ENCOUNTER FOR SCREENING FOR OTHER VIRAL DISEASES: ICD-10-CM

## 2021-06-18 DIAGNOSIS — E11.65 TYPE 2 DIABETES MELLITUS WITH HYPERGLYCEMIA, WITH LONG-TERM CURRENT USE OF INSULIN (H): Primary | ICD-10-CM

## 2021-06-18 DIAGNOSIS — Z01.818 PREOP GENERAL PHYSICAL EXAM: ICD-10-CM

## 2021-06-18 LAB
ALBUMIN SERPL-MCNC: 3.7 G/DL (ref 3.4–5)
ALP SERPL-CCNC: 92 U/L (ref 40–150)
ALT SERPL W P-5'-P-CCNC: 43 U/L (ref 0–70)
ANION GAP SERPL CALCULATED.3IONS-SCNC: 8 MMOL/L (ref 3–14)
AST SERPL W P-5'-P-CCNC: 20 U/L (ref 0–45)
BASOPHILS # BLD AUTO: 0.1 10E9/L (ref 0–0.2)
BASOPHILS NFR BLD AUTO: 1.2 %
BILIRUB SERPL-MCNC: 0.6 MG/DL (ref 0.2–1.3)
BUN SERPL-MCNC: 22 MG/DL (ref 7–30)
CALCIUM SERPL-MCNC: 8.6 MG/DL (ref 8.5–10.1)
CHLORIDE SERPL-SCNC: 104 MMOL/L (ref 94–109)
CO2 SERPL-SCNC: 29 MMOL/L (ref 20–32)
CREAT SERPL-MCNC: 1.44 MG/DL (ref 0.66–1.25)
DIFFERENTIAL METHOD BLD: NORMAL
EOSINOPHIL # BLD AUTO: 0.2 10E9/L (ref 0–0.7)
EOSINOPHIL NFR BLD AUTO: 2.5 %
ERYTHROCYTE [DISTWIDTH] IN BLOOD BY AUTOMATED COUNT: 14.1 % (ref 10–15)
GFR SERPL CREATININE-BSD FRML MDRD: 47 ML/MIN/{1.73_M2}
GLUCOSE BLDC GLUCOMTR-MCNC: 136 MG/DL (ref 70–99)
GLUCOSE SERPL-MCNC: 136 MG/DL (ref 70–99)
HBA1C MFR BLD: 8.8 % (ref 0–5.6)
HBA1C MFR BLD: 9 % (ref 4.3–6)
HCT VFR BLD AUTO: 41.8 % (ref 40–53)
HGB BLD-MCNC: 13.4 G/DL (ref 13.3–17.7)
IMM GRANULOCYTES # BLD: 0 10E9/L (ref 0–0.4)
IMM GRANULOCYTES NFR BLD: 0.3 %
LYMPHOCYTES # BLD AUTO: 2 10E9/L (ref 0.8–5.3)
LYMPHOCYTES NFR BLD AUTO: 30 %
MCH RBC QN AUTO: 26.7 PG (ref 26.5–33)
MCHC RBC AUTO-ENTMCNC: 32.1 G/DL (ref 31.5–36.5)
MCV RBC AUTO: 83 FL (ref 78–100)
MONOCYTES # BLD AUTO: 0.7 10E9/L (ref 0–1.3)
MONOCYTES NFR BLD AUTO: 10.8 %
NEUTROPHILS # BLD AUTO: 3.7 10E9/L (ref 1.6–8.3)
NEUTROPHILS NFR BLD AUTO: 55.2 %
NRBC # BLD AUTO: 0 10*3/UL
NRBC BLD AUTO-RTO: 0 /100
PLATELET # BLD AUTO: 202 10E9/L (ref 150–450)
POTASSIUM SERPL-SCNC: 3.6 MMOL/L (ref 3.4–5.3)
PROT SERPL-MCNC: 7.4 G/DL (ref 6.8–8.8)
RBC # BLD AUTO: 5.02 10E12/L (ref 4.4–5.9)
SARS-COV-2 RNA RESP QL NAA+PROBE: NORMAL
SODIUM SERPL-SCNC: 140 MMOL/L (ref 133–144)
SPECIMEN SOURCE: NORMAL
WBC # BLD AUTO: 6.8 10E9/L (ref 4–11)

## 2021-06-18 PROCEDURE — 99204 OFFICE O/P NEW MOD 45 MIN: CPT | Performed by: INTERNAL MEDICINE

## 2021-06-18 PROCEDURE — U0003 INFECTIOUS AGENT DETECTION BY NUCLEIC ACID (DNA OR RNA); SEVERE ACUTE RESPIRATORY SYNDROME CORONAVIRUS 2 (SARS-COV-2) (CORONAVIRUS DISEASE [COVID-19]), AMPLIFIED PROBE TECHNIQUE, MAKING USE OF HIGH THROUGHPUT TECHNOLOGIES AS DESCRIBED BY CMS-2020-01-R: HCPCS | Performed by: PATHOLOGY

## 2021-06-18 PROCEDURE — 36415 COLL VENOUS BLD VENIPUNCTURE: CPT | Performed by: PATHOLOGY

## 2021-06-18 PROCEDURE — 85025 COMPLETE CBC W/AUTO DIFF WBC: CPT | Performed by: PATHOLOGY

## 2021-06-18 PROCEDURE — U0005 INFEC AGEN DETEC AMPLI PROBE: HCPCS | Mod: 90 | Performed by: PATHOLOGY

## 2021-06-18 PROCEDURE — 99204 OFFICE O/P NEW MOD 45 MIN: CPT | Mod: 25 | Performed by: NURSE PRACTITIONER

## 2021-06-18 PROCEDURE — 83036 HEMOGLOBIN GLYCOSYLATED A1C: CPT | Performed by: INTERNAL MEDICINE

## 2021-06-18 PROCEDURE — 80053 COMPREHEN METABOLIC PANEL: CPT | Performed by: PATHOLOGY

## 2021-06-18 PROCEDURE — 83036 HEMOGLOBIN GLYCOSYLATED A1C: CPT | Performed by: PATHOLOGY

## 2021-06-18 PROCEDURE — 93000 ELECTROCARDIOGRAM COMPLETE: CPT | Performed by: NURSE PRACTITIONER

## 2021-06-18 RX ORDER — PEN NEEDLE, DIABETIC 32GX 5/32"
NEEDLE, DISPOSABLE MISCELLANEOUS
Qty: 45 EACH | Refills: 1 | Status: SHIPPED | OUTPATIENT
Start: 2021-06-18

## 2021-06-18 ASSESSMENT — MIFFLIN-ST. JEOR
SCORE: 1667.05
SCORE: 1716.95

## 2021-06-18 ASSESSMENT — PAIN SCALES - GENERAL
PAINLEVEL: NO PAIN (0)
PAINLEVEL: NO PAIN (0)

## 2021-06-18 NOTE — PATIENT INSTRUCTIONS
Pre-operative Instructions:  Proceed with surgery as planned    Call surgeon's office for time you need to arrive and for more details on your bowel prep in order to coordinated with drive from [place].      Medications:  GI Prep medications as prescribed.  Diabetes medications per endocrinologist.  Other medications can be taken as usual the night before surgery.       Nothing to eat or drink:  Clear liquid diet beginning Monday 6/21 AM with breakfast.  Stop clear liquids and all fluids 2 hours prior to surgery.   Avoid Ibuprofen, other NSAIDS, and ASA for 7 days prior to surgery  Tylenol if needed for discomforts, not to exceed 3000 mg/day  Limit alcohol use and hydrate well in advance of surgery  Call surgeon's office if illness symptoms prior to surgery  Self-isolate as possible and mask for any outings  Hand-hygiene following all non-immediate household interactions  Covid testing as scheduled  Results on any labs obtained will be available through Josiane Reynoso NP

## 2021-06-18 NOTE — LETTER
6/18/2021       RE: Da Amaya  [address]  [address] [facility] [address]     Dear Colleague,    Thank you for referring your patient, Da Amaya, to the [facility] ENDO[facility]OLOG[facility] [place] at [facility]. Please see a copy of my visit note below.                                                                               - Endocrinology Initial Consultation -    Reason for visit/consult:  Type 2 diabetes mellitus without complication, with long-term current use of insulin (H)    Primary care provider: Toro Worley    HPI: A 77 yo male here for DM management prior to the surgery.   He has a history of ascending colon cancer s/p right hemicolectomy in October 2019.  This time because of repeated a colonoscopy in April 2021 showed large unresectable polyp therefore undergo to surgery on June 22, 2021.  Patient has had diabetes since 2009 currently taking only oral medication which is Metformin 1000 mg twice daily and glipizide 10 mg tablet 4 tablet daily.  His A1c today 9.0.  He never tried insulin before.  He did not eat any food today and random glucose today 136.   Other medical history including essential hypertension, today blood pressure 173/78.   He was past smoker, he has a cataract managed by ophthalmologist.       Past Medical/Surgical History:  No past medical history on file.  No past surgical history on file.    Allergies:  Allergies   Allergen Reactions     Neosporin [Neomycin-Polymyx-Gramicid] Rash and Blisters       Current Medications   Current Outpatient Medications   Medication     amLODIPine (NORVASC) 10 MG tablet     atorvastatin (LIPITOR) 40 MG tablet     glipiZIDE (GLUCOTROL) 10 MG tablet     losartan-hydrochlorothiazide (HYZAAR) 100-25 MG tablet     metFORMIN (GLUCOPHAGE) 1000 MG tablet     metoprolol succinate ER (TOPROL-XL) 200 MG 24 hr tablet     metroNIDAZOLE (FLAGYL) 500 MG tablet     neomycin (MYCIFRADIN) 500 MG  "tablet     ondansetron (ZOFRAN) 4 MG tablet     pantoprazole (PROTONIX) 40 MG EC tablet     polyethylene glycol (MIRALAX) 17 g packet     tamsulosin (FLOMAX) 0.4 MG capsule     No current facility-administered medications for this visit.        Family History:  No family history on file.    Social History:  Social History     Tobacco Use     Smoking status: Former Smoker     Quit date:      Years since quittin.4     Smokeless tobacco: Never Used   Substance Use Topics     Alcohol use: Yes     Frequency: Monthly or less       ROS:  Full review of systems taken with the help of the intake sheet. Otherwise a complete 14 point review of systems was taken and is negative unless stated in the history above.      Physical Exam:   Vitals: BP (!) 173/78   Pulse 66   Ht 1.727 m (5' 8\")   Wt 100.2 kg (221 lb)   BMI 33.60 kg/m    BMI= Body mass index is 33.6 kg/m .   General: well appearing, no acute distress, pleasant and conversant,   Mental Status/neuro: alert and oriented  Face: symmetrical, normal facial color  Eyes: anicteric, PERRL, no proptosis or lid lag  Leg: no edema  Foot exam: intact sensation, deformity+ bilateral, onychomycosis+    Labs : I reviewed data from epic and extract and summarize the pertinent data here.   Lab Results   Component Value Date     2021      Lab Results   Component Value Date    POTASSIUM 3.7 2021     Lab Results   Component Value Date    CHLORIDE 101 2021     Lab Results   Component Value Date    BOOGIE 9.2 2021     Lab Results   Component Value Date    CO2 28 2021     Lab Results   Component Value Date    BUN 28 2021     Lab Results   Component Value Date    CR 1.48 2021     Lab Results   Component Value Date     2021     No results found for: TSH  No results found for: T4  Lab Results   Component Value Date    A1C 8.9 2021       Assessment and Plan  76 year old male with DM2, colon cancer, pre op " management    DM2 A1C 9.0    - start Lantus 20 unit daily until the day of the surgery, teaching of insulin injection done today    - Metformin 1000 mg BID to continue (no change)    - Glipizide 10 mg 4 tab daily to conitnue (no change)     CKD      RTC with me or any provider or local provider      45 minutes spent on the date of the encounter doing chart review, history and exam, documentation and further activities as noted above.        Dena Gregg MD  Staff Physician  Endocrinology and Metabolism  License: [id number]

## 2021-06-18 NOTE — TELEPHONE ENCOUNTER
RECORDS RECEIVED FROM: Internal   DATE RECEIVED: 6.18.21   NOTES (FOR ALL VISITS) STATUS DETAILS   OFFICE NOTES from referring provider Internal 6.2.21 JOSIAH Muller  [facility]   OFFICE NOTES from other specialist N/A    ED NOTES N/A    OPERATIVE REPORT  (thyroid, pituitary, adrenal, parathyroid) N/A    MEDICATION LIST Internal    IMAGING      DEXASCAN N/A    MRI (BRAIN) N/A    XR (Chest) N/A    CT (HEAD/NECK/CHEST/ABDOMEN) Received 4.28.21 Ct chest ab pelvis, [facility]  9.3.19 CT chest ab pelvis, [facility]     NUCLEAR  N/A    ULTRASOUND (HEAD/NECK) N/A    LABS     DIABETES: HBGA1C, CREATININE, FASTING LIPIDS, MICROALBUMIN URINE, POTASSIUM, TSH, T4    THYROID: TSH, T4, CBC, THYRODLONULIN, TOTAL T3, FREE T4, CALCITONIN, CEA Internal

## 2021-06-18 NOTE — PROGRESS NOTES
[facility] [address] [address]  Phone: [phone]  Fax: [phone]  Primary Provider: Toro Worley      PREOPERATIVE EVALUATION:  Today's date: 6/18/2021    Da Amaya is a 76 year old male with history of colon cancer, T2 diabetes, CAD s/p CABG (2009), hypertension, CKD stage 3a, and dyslipidemia who presents for a preoperative evaluation.  Accompanied today by friend Marjorie.      Surgical Information:  Surgery/Procedure: LAPAROSCOPIC transverse colectomy  Surgery Location: [facility]  Surgeon: Dr. Muller  Surgery Date: 6/22/21  Time of Surgery: 8:00am  Where patient plans to recover: Other: Admit  Fax number for surgical facility: Note does not need to be faxed, will be available electronically in Epic.    Type of Anesthesia Anticipated: Choice      Subjective     HPI related to upcoming procedure:   History of ascending colon cancer s/p right hemicolectomy in October 2019.  Repeat colonoscopy in April 2021 showed large unresectable polyp, therefore undergoing surgery on June 22, 2021.       1. Yes - Have you ever had a heart attack or stroke? MI in 2009  2. Yes - Have you ever had surgery on your heart or blood vessels, such as a stent, coronary (heart) bypass, or surgery on an artery in the head, neck, heart, or legs?  3- vessel CABG in 2009  3. No - Do you have chest pain when you are physically active?  No chest pain since 2009  4. No - Do you have a history of heart failure?    5. Yes - Do you currently have a cold, bronchitis, or symptoms of other respiratory (head and chest) infections?  Chronic bronchitis symptoms X 4 years.    6. No - Do you have a cough, shortness of breath, or wheezing?      7. No - Do you or anyone in your family have a history of blood clots?  8. No - Do you or anyone in your family have a serious bleeding problem, such as long-lasting bleeding after surgeries or cuts?  9. No - Have you ever  had anemia or been told to take iron pills?  10. Yes - Have you had any abnormal blood loss such as black, tarry or bloody stools, or abnormal vaginal bleeding?  Stool very dark - unsure if there is blood  11. No - Have you ever had a blood transfusion?  12. Yes - Are you willing to have a blood transfusion if it is medically needed before, during, or after your surgery?  13. No - Have you or anyone in your family ever had problems with anesthesia (sedation for surgery)?  14. Yes - Do you have sleep apnea, excessive snoring, or daytime drowsiness?   Snoring  15. No - Do you have any artifical heart valves or other implanted medical devices, such as a pacemaker, defibrillator, or continuous glucose monitor?  16. No - Do you have any artifical joints?  17. No - Are you allergic to latex?  18. No - Is there any chance that you may be pregnant?      Health Care Directive:  Patient does not have a Health Care Directive or Living Will: Discussed advance care planning with patient; however, patient declined at this time.      PMH:    CAD s/p 3-vessel CABG (2009). Asymptomatic.  T2DM - A1C elevated at 8.8. seeing endocrinology today  Hypertension - on four agents (amlodipine, losartan, hydrochlorothiazide, metoprolol).  Reports previously in good control.  CKD stage 3a  Dyslipidemia  BPH      Review of Systems  CONSTITUTIONAL: NEGATIVE for fever, chills, change in weight  INTEGUMENTARY/SKIN: NEGATIVE for worrisome rashes, moles or lesions  EYES: NEGATIVE for vision changes or irritation  ENT/MOUTH: NEGATIVE for ear, mouth and throat problems  RESP: Positive for cough.  No SOB  BREAST: NEGATIVE for masses, tenderness or discharge  CV: NEGATIVE for chest pain, palpitations.  Positive for peripheral edema  GI: NEGATIVE for nausea, abdominal pain, heartburn.  Positive for change in bowel habits  : NEGATIVE for frequency, dysuria, or hematuria  MUSCULOSKELETAL: NEGATIVE for significant arthralgias or myalgia  NEURO: NEGATIVE  "for weakness, dizziness or paresthesias  ENDOCRINE: NEGATIVE for temperature intolerance, skin/hair changes  HEME: NEGATIVE for bleeding problems  PSYCHIATRIC: NEGATIVE for changes in mood or affect. Stress symptoms with current health concerns        Patient Active Problem List    Diagnosis Date Noted     Colon cancer (H) 06/07/2021     Priority: Medium     Added automatically from request for surgery [id number]       Diabetes mellitus, type 2 (H) 06/02/2021     Priority: Medium      No past medical history on file.  No past surgical history on file.  Current Outpatient Medications   Medication Sig Dispense Refill     amLODIPine (NORVASC) 10 MG tablet        atorvastatin (LIPITOR) 40 MG tablet        glipiZIDE (GLUCOTROL) 10 MG tablet        losartan-hydrochlorothiazide (HYZAAR) 100-25 MG tablet        metFORMIN (GLUCOPHAGE) 1000 MG tablet TAKE ONE TABLET BY MOUTH TWICE DAILY WITH MEALS       metoprolol succinate ER (TOPROL-XL) 200 MG 24 hr tablet        metroNIDAZOLE (FLAGYL) 500 MG tablet Take 1 tablet (500 mg) by mouth every 6 hours At 8:00 am, 2:00 pm, 8:00 pm the day prior to your surgery with neomycin and zofran. 3 tablet 0     neomycin (MYCIFRADIN) 500 MG tablet Take 2 tablets (1,000 mg) by mouth every 6 hours At 8:00 am, 2:00 pm, 8:00 pm the day prior to your surgery with flagyl and zofran. 6 tablet 0     ondansetron (ZOFRAN) 4 MG tablet Take 1 tablet (4 mg) by mouth every 6 hours At 8:00 am, 2:00 pm, 8:00 pm the day prior to your surgery with neomycin and flagyl. 3 tablet 0     pantoprazole (PROTONIX) 40 MG EC tablet        polyethylene glycol (MIRALAX) 17 g packet Take 238 g by mouth See Admin Instructions Starting at 4 pm night prior to surgery. Refer to \"Getting Ready for Surgery\" instructions. 14 packet 0     tamsulosin (FLOMAX) 0.4 MG capsule          Allergies   Allergen Reactions     Neosporin [Neomycin-Polymyx-Gramicid] Rash and Blisters        Social History     Tobacco Use     Smoking status: Former " "Smoker     Quit date:      Years since quittin.4     Smokeless tobacco: Never Used   Substance Use Topics     Alcohol use: Yes     Frequency: Monthly or less         Objective       Physical Exam  BP (!) 174/83   Pulse 61   Temp 98  F (36.7  C) (Oral)   Resp 16   Ht 1.727 m (5' 8\")   Wt 95.3 kg (210 lb)   SpO2 97%   BMI 31.93 kg/m  '      GENERAL APPEARANCE: healthy, alert and no distress     EYES: EOMI,  PERRL     HENT: ear canals and TM's normal and nose and mouth without ulcers or lesions     NECK: no adenopathy, no asymmetry, masses, or scars and thyroid normal to palpation.      RESP: lungs clear to auscultation - no rales, rhonchi or wheezes     CV: regular rates and rhythm, normal S1 S2, no S3 or S4 and no murmur, click or rub. 1+ edema in the lower extremities.     ABDOMEN:  soft, no HSM or masses and bowel sounds normal.  Mild tenderness to palpation RLQ     MS: extremities normal- no gross deformities noted, no evidence of inflammation in joints, full ROM in all extremities.     SKIN: no suspicious lesions or rashes     NEURO: Normal strength and tone, sensory exam grossly normal, mentation intact and speech normal     PSYCH: mentation appears normal. and affect normal/bright     LYMPHATICS: No cervical adenopathy    Recent Labs   Lab Test 21  1435 21   HGB 13.6  --   --      --   --      --   --    POTASSIUM 3.7  --  4.1   CR 1.48*  --  1.30   A1C  --  8.9*  --         Diagnostics:  Labs pending at this time.  Results will be reviewed when available.   EKG required for known coronary heart disease and not completed in the last 90 days.    EKG shows bradycardia, Inverted T waves  Lead I, aVL, V2.  No previous studies for comparison.  Await cardiology read.      Revised Cardiac Risk Index (RCRI):  The patient has the following serious cardiovascular risks for perioperative complications:  Pre-operative use of insulin   - Intermediate cardiac risk = 1 points "     RCRI Interpretation: 1 points: Class III (6% complication rate)      Assessment/Plan:     (Z01.818) Preop general physical exam  (primary encounter diagnosis)  (C18.9) Malignant neoplasm of colon, unspecified part of colon (H)  Plan: CBC with platelets differential, Comprehensive         metabolic panel, EKG 12-lead complete w/read -         Clinics, Glucose by meter          (E11.9) Type 2 diabetes mellitus without complication, without long-term current use of insulin (H)  Comment:  In need of improved control  Plan:  Seeing endocrinology today    (I25.810) Coronary artery disease involving other coronary artery bypass graft without angina pectoris  (I10) Hypertension  (N18.31)  CKD Stage 3a  (E78.5) Dyslipidemia  Comment:  Poorly controlled hypertension and mild LE edema in the absence of other cardiac symptoms.  Reports BPs previously well controlled.  Records not available for comparison.  Plan: EKG 12-lead complete w/read - Clinics  Adding low dose spironolactone, 12.5 mg every other day (renal dosing).            Proceed with surgery as planned  Call surgeon's office for time you need to arrive and for more details on your bowel prep in order to coordinate with drive from [place].    Medications:  GI Prep medications as prescribed.  Diabetes medications per endocrinologist.  Other medications can be taken as usual the night before surgery.     Nothing to eat or drink:  Clear liquid diet beginning Monday 6/21 AM with breakfast.  Stop clear liquids and all fluids 2 hours prior to surgery.   Avoid Ibuprofen, other NSAIDS, and ASA for 7 days prior to surgery  Tylenol if needed for discomforts, not to exceed 3000 mg/day  Limit alcohol use and hydrate well in advance of surgery  Call surgeon's office if illness symptoms prior to surgery  Self-isolate as possible and mask for any outings  Hand-hygiene following all non-immediate household interactions  Covid testing as scheduled  Results on any labs obtained  will be available through [facility]        Signed Electronically by: Angie Reynoso NP  Copy of this evaluation report is provided to requesting physician.        Addendum 6/21/21    Cardiovascular Risk:  This patient ambulates without assistance and without chest pain or shortness of breath.  He is able to climb a flight of stairs without chest pain.    The patient does not have chest pain with rest or with physical activity.    He does have a history of hypertension and 3-vessel CABG more than 5 years ago (2009) and aysmptomatic. Former smoker. The patient does not have a history of stroke, and does not have a history of valvular disease.    Pulmonary Risk:  In terms of risk factors for pulmonary complications, the patient does not have a history of asthma, COPD, fibrotic lung disease, or sleep apnea.     Perioperative Complications:  The patient does not have a history of bleeding or clotting problems in the past. The patient has not  had complications from past surgeries.  The patient does not have a family history of any anesthesia or surgical complications.    METS Functional Capacity:  Poor < 4 METS  Endorses ability to climb a flight of stairs without stopping, shortness of breath, or chest pain.   Heavier work in the yard requires more time and resting  Does not meet criteria for moderate-good functional capacity.  Poor functional capacity < 4 METS    This is an Intermediate Risk surgical procedure.    Revised Cardiac Risk Index:  +1  Elevated risk surgery - No  History if Ischemia Heart Disease -  Does not meet criteria  History of Congestive Heart Failure - No  History of Cerebrovascular Disease - No  Pre-Operative Treatment with Insulin - Yes  +1    RCRI Interpretation: 1 points: Class II (6% complication rate)    Patient endorses starting spironolactone and subcutaneous insulin, as indicated above.      Assessment/Plan:   Unable to add BNP due to non-coverage by insurance and patient not available to  sign waiver.   Due to age > 65 and RCRI +1, recommend EKG monitoring in PACU and troponin measurement daily X 48-72 hours.      Signed Electronically by: Angie Reynoso NP  Copy of this evaluation report is provided to requesting physician.

## 2021-06-18 NOTE — PROGRESS NOTES
- Endocrinology Initial Consultation -    Reason for visit/consult:  Type 2 diabetes mellitus without complication, with long-term current use of insulin (H)    Primary care provider: Toro Worley    HPI: A 77 yo male here for DM management prior to the surgery.   He has a history of ascending colon cancer s/p right hemicolectomy in October 2019.  This time because of repeated a colonoscopy in April 2021 showed large unresectable polyp therefore undergo to surgery on June 22, 2021.  Patient has had diabetes since 2009 currently taking only oral medication which is Metformin 1000 mg twice daily and glipizide 10 mg tablet 4 tablet daily.  His A1c today 9.0.  He never tried insulin before.  He did not eat any food today and random glucose today 136.   Other medical history including essential hypertension, today blood pressure 173/78.   He was past smoker, he has a cataract managed by ophthalmologist.       Past Medical/Surgical History:  No past medical history on file.  No past surgical history on file.    Allergies:  Allergies   Allergen Reactions     Neosporin [Neomycin-Polymyx-Gramicid] Rash and Blisters       Current Medications   Current Outpatient Medications   Medication     amLODIPine (NORVASC) 10 MG tablet     atorvastatin (LIPITOR) 40 MG tablet     glipiZIDE (GLUCOTROL) 10 MG tablet     losartan-hydrochlorothiazide (HYZAAR) 100-25 MG tablet     metFORMIN (GLUCOPHAGE) 1000 MG tablet     metoprolol succinate ER (TOPROL-XL) 200 MG 24 hr tablet     metroNIDAZOLE (FLAGYL) 500 MG tablet     neomycin (MYCIFRADIN) 500 MG tablet     ondansetron (ZOFRAN) 4 MG tablet     pantoprazole (PROTONIX) 40 MG EC tablet     polyethylene glycol (MIRALAX) 17 g packet     tamsulosin (FLOMAX) 0.4 MG capsule     No current facility-administered medications for this visit.        Family History:  No family history on file.    Social History:  Social History  "    Tobacco Use     Smoking status: Former Smoker     Quit date:      Years since quittin.4     Smokeless tobacco: Never Used   Substance Use Topics     Alcohol use: Yes     Frequency: Monthly or less       ROS:  Full review of systems taken with the help of the intake sheet. Otherwise a complete 14 point review of systems was taken and is negative unless stated in the history above.      Physical Exam:   Vitals: BP (!) 173/78   Pulse 66   Ht 1.727 m (5' 8\")   Wt 100.2 kg (221 lb)   BMI 33.60 kg/m    BMI= Body mass index is 33.6 kg/m .   General: well appearing, no acute distress, pleasant and conversant,   Mental Status/neuro: alert and oriented  Face: symmetrical, normal facial color  Eyes: anicteric, PERRL, no proptosis or lid lag  Leg: no edema  Foot exam: intact sensation, deformity+ bilateral, onychomycosis+    Labs : I reviewed data from epic and extract and summarize the pertinent data here.   Lab Results   Component Value Date     2021      Lab Results   Component Value Date    POTASSIUM 3.7 2021     Lab Results   Component Value Date    CHLORIDE 101 2021     Lab Results   Component Value Date    BOOGIE 9.2 2021     Lab Results   Component Value Date    CO2 28 2021     Lab Results   Component Value Date    BUN 28 2021     Lab Results   Component Value Date    CR 1.48 2021     Lab Results   Component Value Date     2021     No results found for: TSH  No results found for: T4  Lab Results   Component Value Date    A1C 8.9 2021       Assessment and Plan  76 year old male with DM2, colon cancer, pre op management    DM2 A1C 9.0    - start Lantus 20 unit daily until the day of the surgery, teaching of insulin injection done today    - Metformin 1000 mg BID to continue (no change)    - Glipizide 10 mg 4 tab daily to conitnue (no change)     CKD      RTC with me or any provider or local provider      45 minutes spent on the date of the " encounter doing chart review, history and exam, documentation and further activities as noted above.        Dena Gregg MD  Staff Physician  Endocrinology and Metabolism  License: [id number]

## 2021-06-18 NOTE — NURSING NOTE
Chief Complaint   Patient presents with     Pre-Op Exam     Patient comes in for a preop exam.         Wilfrido Rush MA on 6/18/2021 at 1:03 PM

## 2021-06-19 LAB
LABORATORY COMMENT REPORT: NORMAL
SARS-COV-2 RNA RESP QL NAA+PROBE: NEGATIVE
SPECIMEN SOURCE: NORMAL

## 2021-06-19 RX ORDER — SPIRONOLACTONE 25 MG/1
12.5 TABLET ORAL EVERY OTHER DAY
Qty: 30 TABLET | Refills: 1 | Status: ON HOLD | OUTPATIENT
Start: 2021-06-19 | End: 2021-07-02

## 2021-06-21 ENCOUNTER — TELEPHONE (OUTPATIENT)
Dept: SURGERY | Facility: CLINIC | Age: 76
End: 2021-06-21

## 2021-06-21 ENCOUNTER — ANESTHESIA EVENT (OUTPATIENT)
Dept: SURGERY | Facility: CLINIC | Age: 76
DRG: 330 | End: 2021-06-21
Payer: MEDICARE

## 2021-06-21 LAB — INTERPRETATION ECG - MUSE: NORMAL

## 2021-06-21 NOTE — TELEPHONE ENCOUNTER
On 6/18/2021:  Patient left a vm msg that I was unable to understand. Patient's emergency contact Marjorie then called in and left vm msg asking for arrival time for patient's 8:00 AM start time surgery at [place] on 6/22/2021.    On 6/21/2021:  Tried calling patient to give him the arrival time of 6:00 AM for his surgery, received automated message that the wireless customer called is unavailable right now, unable to leave vm msg. Called Marjorie left vm msg with arrival time of 6:00 AM for Da tomorrow.

## 2021-06-22 ENCOUNTER — HOSPITAL ENCOUNTER (INPATIENT)
Facility: CLINIC | Age: 76
LOS: 13 days | Discharge: HOME | DRG: 330 | End: 2021-07-05
Attending: SURGERY | Admitting: SURGERY
Payer: MEDICARE

## 2021-06-22 ENCOUNTER — ANCILLARY PROCEDURE (OUTPATIENT)
Dept: ULTRASOUND IMAGING | Facility: CLINIC | Age: 76
End: 2021-06-22
Payer: MEDICARE

## 2021-06-22 ENCOUNTER — ANESTHESIA (OUTPATIENT)
Dept: SURGERY | Facility: CLINIC | Age: 76
DRG: 330 | End: 2021-06-22
Payer: MEDICARE

## 2021-06-22 DIAGNOSIS — E11.65 TYPE 2 DIABETES MELLITUS WITH HYPERGLYCEMIA, WITH LONG-TERM CURRENT USE OF INSULIN (H): ICD-10-CM

## 2021-06-22 DIAGNOSIS — R19.5 LOOSE STOOLS: ICD-10-CM

## 2021-06-22 DIAGNOSIS — N18.31 TYPE 2 DIABETES MELLITUS WITH STAGE 3A CHRONIC KIDNEY DISEASE, UNSPECIFIED WHETHER LONG TERM INSULIN USE (H): ICD-10-CM

## 2021-06-22 DIAGNOSIS — C18.9 COLON CANCER (H): ICD-10-CM

## 2021-06-22 DIAGNOSIS — I10 BENIGN ESSENTIAL HYPERTENSION: Primary | ICD-10-CM

## 2021-06-22 DIAGNOSIS — Z90.49 STATUS POST PARTIAL COLECTOMY: ICD-10-CM

## 2021-06-22 DIAGNOSIS — Z79.4 TYPE 2 DIABETES MELLITUS WITH HYPERGLYCEMIA, WITH LONG-TERM CURRENT USE OF INSULIN (H): ICD-10-CM

## 2021-06-22 DIAGNOSIS — E11.22 TYPE 2 DIABETES MELLITUS WITH STAGE 3A CHRONIC KIDNEY DISEASE, UNSPECIFIED WHETHER LONG TERM INSULIN USE (H): ICD-10-CM

## 2021-06-22 LAB
ABO + RH BLD: NORMAL
ABO + RH BLD: NORMAL
BASE DEFICIT BLDA-SCNC: 0.5 MMOL/L
BASE DEFICIT BLDA-SCNC: 1.1 MMOL/L
BASE DEFICIT BLDA-SCNC: 2.4 MMOL/L
BLD GP AB SCN SERPL QL: NORMAL
BLOOD BANK CMNT PATIENT-IMP: NORMAL
CA-I BLD-MCNC: 4.3 MG/DL (ref 4.4–5.2)
CA-I BLD-MCNC: 4.4 MG/DL (ref 4.4–5.2)
CA-I BLD-MCNC: 4.4 MG/DL (ref 4.4–5.2)
CREAT SERPL-MCNC: 1.22 MG/DL (ref 0.66–1.25)
GFR SERPL CREATININE-BSD FRML MDRD: 58 ML/MIN/{1.73_M2}
GLUCOSE BLD-MCNC: 185 MG/DL (ref 70–99)
GLUCOSE BLD-MCNC: 209 MG/DL (ref 70–99)
GLUCOSE BLD-MCNC: 225 MG/DL (ref 70–99)
GLUCOSE BLDC GLUCOMTR-MCNC: 161 MG/DL (ref 70–99)
GLUCOSE BLDC GLUCOMTR-MCNC: 192 MG/DL (ref 70–99)
GLUCOSE BLDC GLUCOMTR-MCNC: 194 MG/DL (ref 70–99)
GLUCOSE BLDC GLUCOMTR-MCNC: 199 MG/DL (ref 70–99)
GLUCOSE BLDC GLUCOMTR-MCNC: 215 MG/DL (ref 70–99)
HCO3 BLD-SCNC: 23 MMOL/L (ref 21–28)
HCO3 BLD-SCNC: 24 MMOL/L (ref 21–28)
HCO3 BLD-SCNC: 26 MMOL/L (ref 21–28)
HGB BLD-MCNC: 12.2 G/DL (ref 13.3–17.7)
HGB BLD-MCNC: 12.6 G/DL (ref 13.3–17.7)
HGB BLD-MCNC: 12.9 G/DL (ref 13.3–17.7)
LACTATE BLD-SCNC: 0.7 MMOL/L (ref 0.7–2)
LACTATE BLD-SCNC: 0.8 MMOL/L (ref 0.7–2)
LACTATE BLD-SCNC: 1.4 MMOL/L (ref 0.7–2)
MAGNESIUM SERPL-MCNC: 2.2 MG/DL (ref 1.6–2.3)
O2/TOTAL GAS SETTING VFR VENT: 50 %
O2/TOTAL GAS SETTING VFR VENT: 75 %
O2/TOTAL GAS SETTING VFR VENT: 75 %
OXYHGB MFR BLD: 94 % (ref 92–100)
OXYHGB MFR BLD: 95 % (ref 92–100)
PCO2 BLD: 42 MM HG (ref 35–45)
PCO2 BLD: 43 MM HG (ref 35–45)
PCO2 BLD: 47 MM HG (ref 35–45)
PH BLD: 7.34 PH (ref 7.35–7.45)
PH BLD: 7.35 PH (ref 7.35–7.45)
PH BLD: 7.37 PH (ref 7.35–7.45)
PO2 BLD: 143 MM HG (ref 80–105)
PO2 BLD: 87 MM HG (ref 80–105)
PO2 BLD: 95 MM HG (ref 80–105)
POTASSIUM BLD-SCNC: 3.3 MMOL/L (ref 3.4–5.3)
POTASSIUM BLD-SCNC: 3.5 MMOL/L (ref 3.4–5.3)
POTASSIUM BLD-SCNC: 3.5 MMOL/L (ref 3.4–5.3)
POTASSIUM SERPL-SCNC: 3.4 MMOL/L (ref 3.4–5.3)
SODIUM BLD-SCNC: 133 MMOL/L (ref 133–144)
SODIUM BLD-SCNC: 138 MMOL/L (ref 133–144)
SODIUM BLD-SCNC: 138 MMOL/L (ref 133–144)
SPECIMEN EXP DATE BLD: NORMAL

## 2021-06-22 PROCEDURE — 258N000003 HC RX IP 258 OP 636: Performed by: ANESTHESIOLOGY

## 2021-06-22 PROCEDURE — 120N000002 HC R&B MED SURG/OB UMMC

## 2021-06-22 PROCEDURE — 250N000011 HC RX IP 250 OP 636: Performed by: ANESTHESIOLOGY

## 2021-06-22 PROCEDURE — 250N000009 HC RX 250: Performed by: NURSE ANESTHETIST, CERTIFIED REGISTERED

## 2021-06-22 PROCEDURE — 250N000013 HC RX MED GY IP 250 OP 250 PS 637: Performed by: SURGERY

## 2021-06-22 PROCEDURE — 999N000141 HC STATISTIC PRE-PROCEDURE NURSING ASSESSMENT: Performed by: SURGERY

## 2021-06-22 PROCEDURE — 4A1B88Z MONITORING OF GASTROINTESTINAL MOTILITY, VIA NATURAL OR ARTIFICIAL OPENING ENDOSCOPIC: ICD-10-PCS | Performed by: SURGERY

## 2021-06-22 PROCEDURE — 86850 RBC ANTIBODY SCREEN: CPT | Performed by: ANESTHESIOLOGY

## 2021-06-22 PROCEDURE — 84295 ASSAY OF SERUM SODIUM: CPT

## 2021-06-22 PROCEDURE — 84132 ASSAY OF SERUM POTASSIUM: CPT | Performed by: ANESTHESIOLOGY

## 2021-06-22 PROCEDURE — 83605 ASSAY OF LACTIC ACID: CPT

## 2021-06-22 PROCEDURE — 250N000011 HC RX IP 250 OP 636: Performed by: STUDENT IN AN ORGANIZED HEALTH CARE EDUCATION/TRAINING PROGRAM

## 2021-06-22 PROCEDURE — 999N001017 HC STATISTIC GLUCOSE BY METER IP

## 2021-06-22 PROCEDURE — 82803 BLOOD GASES ANY COMBINATION: CPT

## 2021-06-22 PROCEDURE — 84132 ASSAY OF SERUM POTASSIUM: CPT

## 2021-06-22 PROCEDURE — 0DNL4ZZ RELEASE TRANSVERSE COLON, PERCUTANEOUS ENDOSCOPIC APPROACH: ICD-10-PCS | Performed by: SURGERY

## 2021-06-22 PROCEDURE — 86901 BLOOD TYPING SEROLOGIC RH(D): CPT | Performed by: ANESTHESIOLOGY

## 2021-06-22 PROCEDURE — 88309 TISSUE EXAM BY PATHOLOGIST: CPT | Mod: 26 | Performed by: PATHOLOGY

## 2021-06-22 PROCEDURE — 82565 ASSAY OF CREATININE: CPT | Performed by: STUDENT IN AN ORGANIZED HEALTH CARE EDUCATION/TRAINING PROGRAM

## 2021-06-22 PROCEDURE — 0DBU4ZZ EXCISION OF OMENTUM, PERCUTANEOUS ENDOSCOPIC APPROACH: ICD-10-PCS | Performed by: SURGERY

## 2021-06-22 PROCEDURE — 250N000011 HC RX IP 250 OP 636: Performed by: SURGERY

## 2021-06-22 PROCEDURE — 36415 COLL VENOUS BLD VENIPUNCTURE: CPT | Performed by: STUDENT IN AN ORGANIZED HEALTH CARE EDUCATION/TRAINING PROGRAM

## 2021-06-22 PROCEDURE — 999N000015 HC STATISTIC ARTERIAL MONITORING DAILY

## 2021-06-22 PROCEDURE — 258N000003 HC RX IP 258 OP 636: Performed by: STUDENT IN AN ORGANIZED HEALTH CARE EDUCATION/TRAINING PROGRAM

## 2021-06-22 PROCEDURE — 272N000001 HC OR GENERAL SUPPLY STERILE: Performed by: SURGERY

## 2021-06-22 PROCEDURE — 82330 ASSAY OF CALCIUM: CPT

## 2021-06-22 PROCEDURE — 0DNW4ZZ RELEASE PERITONEUM, PERCUTANEOUS ENDOSCOPIC APPROACH: ICD-10-PCS | Performed by: SURGERY

## 2021-06-22 PROCEDURE — 250N000013 HC RX MED GY IP 250 OP 250 PS 637: Performed by: STUDENT IN AN ORGANIZED HEALTH CARE EDUCATION/TRAINING PROGRAM

## 2021-06-22 PROCEDURE — C9290 INJ, BUPIVACAINE LIPOSOME: HCPCS | Performed by: STUDENT IN AN ORGANIZED HEALTH CARE EDUCATION/TRAINING PROGRAM

## 2021-06-22 PROCEDURE — 258N000003 HC RX IP 258 OP 636: Performed by: NURSE ANESTHETIST, CERTIFIED REGISTERED

## 2021-06-22 PROCEDURE — P9041 ALBUMIN (HUMAN),5%, 50ML: HCPCS | Performed by: NURSE ANESTHETIST, CERTIFIED REGISTERED

## 2021-06-22 PROCEDURE — 82810 BLOOD GASES O2 SAT ONLY: CPT

## 2021-06-22 PROCEDURE — 710N000010 HC RECOVERY PHASE 1, LEVEL 2, PER MIN: Performed by: SURGERY

## 2021-06-22 PROCEDURE — 999N000157 HC STATISTIC RCP TIME EA 10 MIN

## 2021-06-22 PROCEDURE — 250N000012 HC RX MED GY IP 250 OP 636 PS 637: Performed by: STUDENT IN AN ORGANIZED HEALTH CARE EDUCATION/TRAINING PROGRAM

## 2021-06-22 PROCEDURE — 36415 COLL VENOUS BLD VENIPUNCTURE: CPT | Performed by: ANESTHESIOLOGY

## 2021-06-22 PROCEDURE — 250N000024 HC ISOFLURANE, PER MIN: Performed by: SURGERY

## 2021-06-22 PROCEDURE — 370N000017 HC ANESTHESIA TECHNICAL FEE, PER MIN: Performed by: SURGERY

## 2021-06-22 PROCEDURE — 360N000077 HC SURGERY LEVEL 4, PER MIN: Performed by: SURGERY

## 2021-06-22 PROCEDURE — 86900 BLOOD TYPING SEROLOGIC ABO: CPT | Performed by: ANESTHESIOLOGY

## 2021-06-22 PROCEDURE — 0DJD8ZZ INSPECTION OF LOWER INTESTINAL TRACT, VIA NATURAL OR ARTIFICIAL OPENING ENDOSCOPIC: ICD-10-PCS | Performed by: SURGERY

## 2021-06-22 PROCEDURE — 82947 ASSAY GLUCOSE BLOOD QUANT: CPT

## 2021-06-22 PROCEDURE — 83735 ASSAY OF MAGNESIUM: CPT | Performed by: ANESTHESIOLOGY

## 2021-06-22 PROCEDURE — 88309 TISSUE EXAM BY PATHOLOGIST: CPT | Mod: TC | Performed by: SURGERY

## 2021-06-22 PROCEDURE — 0DBL4ZZ EXCISION OF TRANSVERSE COLON, PERCUTANEOUS ENDOSCOPIC APPROACH: ICD-10-PCS | Performed by: SURGERY

## 2021-06-22 PROCEDURE — 250N000011 HC RX IP 250 OP 636: Performed by: NURSE ANESTHETIST, CERTIFIED REGISTERED

## 2021-06-22 RX ORDER — ONDANSETRON 2 MG/ML
4 INJECTION INTRAMUSCULAR; INTRAVENOUS EVERY 30 MIN PRN
Status: DISCONTINUED | OUTPATIENT
Start: 2021-06-22 | End: 2021-06-22 | Stop reason: HOSPADM

## 2021-06-22 RX ORDER — ONDANSETRON 4 MG/1
4 TABLET, ORALLY DISINTEGRATING ORAL EVERY 6 HOURS PRN
Status: DISCONTINUED | OUTPATIENT
Start: 2021-06-22 | End: 2021-07-05 | Stop reason: HOSPADM

## 2021-06-22 RX ORDER — DEXTROSE MONOHYDRATE 25 G/50ML
25-50 INJECTION, SOLUTION INTRAVENOUS
Status: DISCONTINUED | OUTPATIENT
Start: 2021-06-22 | End: 2021-07-05 | Stop reason: HOSPADM

## 2021-06-22 RX ORDER — NALOXONE HYDROCHLORIDE 0.4 MG/ML
0.2 INJECTION, SOLUTION INTRAMUSCULAR; INTRAVENOUS; SUBCUTANEOUS
Status: DISCONTINUED | OUTPATIENT
Start: 2021-06-22 | End: 2021-06-22 | Stop reason: HOSPADM

## 2021-06-22 RX ORDER — ACETAMINOPHEN 325 MG/1
975 TABLET ORAL EVERY 8 HOURS
Status: DISCONTINUED | OUTPATIENT
Start: 2021-06-22 | End: 2021-06-22

## 2021-06-22 RX ORDER — INDOCYANINE GREEN AND WATER 25 MG
KIT INJECTION PRN
Status: DISCONTINUED | OUTPATIENT
Start: 2021-06-22 | End: 2021-06-22

## 2021-06-22 RX ORDER — LIDOCAINE 40 MG/G
CREAM TOPICAL
Status: DISCONTINUED | OUTPATIENT
Start: 2021-06-22 | End: 2021-06-22 | Stop reason: HOSPADM

## 2021-06-22 RX ORDER — FENTANYL CITRATE 50 UG/ML
25-50 INJECTION, SOLUTION INTRAMUSCULAR; INTRAVENOUS
Status: DISCONTINUED | OUTPATIENT
Start: 2021-06-22 | End: 2021-06-22 | Stop reason: HOSPADM

## 2021-06-22 RX ORDER — ONDANSETRON 2 MG/ML
4 INJECTION INTRAMUSCULAR; INTRAVENOUS ONCE
Status: DISCONTINUED | OUTPATIENT
Start: 2021-06-22 | End: 2021-06-22 | Stop reason: HOSPADM

## 2021-06-22 RX ORDER — NALOXONE HYDROCHLORIDE 0.4 MG/ML
0.4 INJECTION, SOLUTION INTRAMUSCULAR; INTRAVENOUS; SUBCUTANEOUS
Status: DISCONTINUED | OUTPATIENT
Start: 2021-06-22 | End: 2021-07-05 | Stop reason: HOSPADM

## 2021-06-22 RX ORDER — CEFAZOLIN SODIUM 2 G/100ML
2 INJECTION, SOLUTION INTRAVENOUS SEE ADMIN INSTRUCTIONS
Status: DISCONTINUED | OUTPATIENT
Start: 2021-06-22 | End: 2021-06-22 | Stop reason: HOSPADM

## 2021-06-22 RX ORDER — BUPIVACAINE HYDROCHLORIDE 2.5 MG/ML
INJECTION, SOLUTION EPIDURAL; INFILTRATION; INTRACAUDAL PRN
Status: DISCONTINUED | OUTPATIENT
Start: 2021-06-22 | End: 2021-06-22

## 2021-06-22 RX ORDER — NALOXONE HYDROCHLORIDE 0.4 MG/ML
0.2 INJECTION, SOLUTION INTRAMUSCULAR; INTRAVENOUS; SUBCUTANEOUS
Status: DISCONTINUED | OUTPATIENT
Start: 2021-06-22 | End: 2021-07-05 | Stop reason: HOSPADM

## 2021-06-22 RX ORDER — PHENYLEPHRINE HCL IN 0.9% NACL 50MG/250ML
.1-1 PLASTIC BAG, INJECTION (ML) INTRAVENOUS CONTINUOUS
Status: DISCONTINUED | OUTPATIENT
Start: 2021-06-22 | End: 2021-06-22 | Stop reason: HOSPADM

## 2021-06-22 RX ORDER — ATORVASTATIN CALCIUM 40 MG/1
40 TABLET, FILM COATED ORAL DAILY
Status: DISCONTINUED | OUTPATIENT
Start: 2021-06-23 | End: 2021-07-05 | Stop reason: HOSPADM

## 2021-06-22 RX ORDER — METOPROLOL SUCCINATE 100 MG/1
100 TABLET, EXTENDED RELEASE ORAL DAILY
Status: DISCONTINUED | OUTPATIENT
Start: 2021-06-23 | End: 2021-06-25

## 2021-06-22 RX ORDER — TAMSULOSIN HYDROCHLORIDE 0.4 MG/1
0.4 CAPSULE ORAL DAILY
Status: DISCONTINUED | OUTPATIENT
Start: 2021-06-23 | End: 2021-07-02

## 2021-06-22 RX ORDER — PROPOFOL 10 MG/ML
INJECTION, EMULSION INTRAVENOUS PRN
Status: DISCONTINUED | OUTPATIENT
Start: 2021-06-22 | End: 2021-06-22

## 2021-06-22 RX ORDER — ONDANSETRON 2 MG/ML
4 INJECTION INTRAMUSCULAR; INTRAVENOUS EVERY 6 HOURS PRN
Status: DISCONTINUED | OUTPATIENT
Start: 2021-06-22 | End: 2021-07-05 | Stop reason: HOSPADM

## 2021-06-22 RX ORDER — EPHEDRINE SULFATE 50 MG/ML
INJECTION, SOLUTION INTRAMUSCULAR; INTRAVENOUS; SUBCUTANEOUS PRN
Status: DISCONTINUED | OUTPATIENT
Start: 2021-06-22 | End: 2021-06-22

## 2021-06-22 RX ORDER — ACETAMINOPHEN 325 MG/1
975 TABLET ORAL ONCE
Status: COMPLETED | OUTPATIENT
Start: 2021-06-22 | End: 2021-06-22

## 2021-06-22 RX ORDER — OXYCODONE HYDROCHLORIDE 5 MG/1
5 TABLET ORAL EVERY 4 HOURS PRN
Status: DISCONTINUED | OUTPATIENT
Start: 2021-06-22 | End: 2021-07-05 | Stop reason: HOSPADM

## 2021-06-22 RX ORDER — NALOXONE HYDROCHLORIDE 0.4 MG/ML
0.4 INJECTION, SOLUTION INTRAMUSCULAR; INTRAVENOUS; SUBCUTANEOUS
Status: DISCONTINUED | OUTPATIENT
Start: 2021-06-22 | End: 2021-06-22 | Stop reason: HOSPADM

## 2021-06-22 RX ORDER — ALBUMIN, HUMAN INJ 5% 5 %
SOLUTION INTRAVENOUS CONTINUOUS PRN
Status: DISCONTINUED | OUTPATIENT
Start: 2021-06-22 | End: 2021-06-22

## 2021-06-22 RX ORDER — FENTANYL CITRATE 50 UG/ML
INJECTION, SOLUTION INTRAMUSCULAR; INTRAVENOUS PRN
Status: DISCONTINUED | OUTPATIENT
Start: 2021-06-22 | End: 2021-06-22

## 2021-06-22 RX ORDER — ACETAMINOPHEN 325 MG/1
650 TABLET ORAL EVERY 4 HOURS PRN
Status: DISCONTINUED | OUTPATIENT
Start: 2021-06-25 | End: 2021-06-25

## 2021-06-22 RX ORDER — OXYCODONE HYDROCHLORIDE 10 MG/1
10 TABLET ORAL EVERY 4 HOURS PRN
Status: DISCONTINUED | OUTPATIENT
Start: 2021-06-22 | End: 2021-06-24

## 2021-06-22 RX ORDER — GABAPENTIN 100 MG/1
100 CAPSULE ORAL
Status: COMPLETED | OUTPATIENT
Start: 2021-06-22 | End: 2021-06-22

## 2021-06-22 RX ORDER — GABAPENTIN 100 MG/1
100 CAPSULE ORAL AT BEDTIME
Status: DISCONTINUED | OUTPATIENT
Start: 2021-06-22 | End: 2021-06-22

## 2021-06-22 RX ORDER — FLUMAZENIL 0.1 MG/ML
0.2 INJECTION, SOLUTION INTRAVENOUS
Status: DISCONTINUED | OUTPATIENT
Start: 2021-06-22 | End: 2021-06-22 | Stop reason: HOSPADM

## 2021-06-22 RX ORDER — HYDROMORPHONE HCL IN WATER/PF 6 MG/30 ML
0.2 PATIENT CONTROLLED ANALGESIA SYRINGE INTRAVENOUS
Status: DISCONTINUED | OUTPATIENT
Start: 2021-06-22 | End: 2021-07-02

## 2021-06-22 RX ORDER — HYDROMORPHONE HCL IN WATER/PF 6 MG/30 ML
0.4 PATIENT CONTROLLED ANALGESIA SYRINGE INTRAVENOUS
Status: DISCONTINUED | OUTPATIENT
Start: 2021-06-22 | End: 2021-07-02

## 2021-06-22 RX ORDER — HYDROMORPHONE HYDROCHLORIDE 1 MG/ML
.3-.5 INJECTION, SOLUTION INTRAMUSCULAR; INTRAVENOUS; SUBCUTANEOUS EVERY 5 MIN PRN
Status: DISCONTINUED | OUTPATIENT
Start: 2021-06-22 | End: 2021-06-22 | Stop reason: HOSPADM

## 2021-06-22 RX ORDER — ACETAMINOPHEN 325 MG/1
975 TABLET ORAL EVERY 6 HOURS
Status: DISCONTINUED | OUTPATIENT
Start: 2021-06-22 | End: 2021-07-02

## 2021-06-22 RX ORDER — LIDOCAINE HYDROCHLORIDE 20 MG/ML
INJECTION, SOLUTION INFILTRATION; PERINEURAL PRN
Status: DISCONTINUED | OUTPATIENT
Start: 2021-06-22 | End: 2021-06-22

## 2021-06-22 RX ORDER — PANTOPRAZOLE SODIUM 40 MG/1
40 TABLET, DELAYED RELEASE ORAL
Status: DISCONTINUED | OUTPATIENT
Start: 2021-06-23 | End: 2021-06-25

## 2021-06-22 RX ORDER — POTASSIUM CHLORIDE 7.45 MG/ML
INJECTION INTRAVENOUS PRN
Status: DISCONTINUED | OUTPATIENT
Start: 2021-06-22 | End: 2021-06-22

## 2021-06-22 RX ORDER — CEFAZOLIN SODIUM 2 G/100ML
2 INJECTION, SOLUTION INTRAVENOUS
Status: COMPLETED | OUTPATIENT
Start: 2021-06-22 | End: 2021-06-22

## 2021-06-22 RX ORDER — ONDANSETRON 4 MG/1
4 TABLET, ORALLY DISINTEGRATING ORAL EVERY 30 MIN PRN
Status: DISCONTINUED | OUTPATIENT
Start: 2021-06-22 | End: 2021-06-22 | Stop reason: HOSPADM

## 2021-06-22 RX ORDER — ONDANSETRON 2 MG/ML
INJECTION INTRAMUSCULAR; INTRAVENOUS PRN
Status: DISCONTINUED | OUTPATIENT
Start: 2021-06-22 | End: 2021-06-22

## 2021-06-22 RX ORDER — GLYCOPYRROLATE 0.2 MG/ML
INJECTION, SOLUTION INTRAMUSCULAR; INTRAVENOUS PRN
Status: DISCONTINUED | OUTPATIENT
Start: 2021-06-22 | End: 2021-06-22

## 2021-06-22 RX ORDER — SODIUM CHLORIDE, SODIUM LACTATE, POTASSIUM CHLORIDE, CALCIUM CHLORIDE 600; 310; 30; 20 MG/100ML; MG/100ML; MG/100ML; MG/100ML
INJECTION, SOLUTION INTRAVENOUS CONTINUOUS
Status: DISCONTINUED | OUTPATIENT
Start: 2021-06-22 | End: 2021-06-22 | Stop reason: HOSPADM

## 2021-06-22 RX ORDER — LABETALOL HYDROCHLORIDE 5 MG/ML
10 INJECTION, SOLUTION INTRAVENOUS
Status: DISCONTINUED | OUTPATIENT
Start: 2021-06-22 | End: 2021-06-22 | Stop reason: HOSPADM

## 2021-06-22 RX ORDER — NICOTINE POLACRILEX 4 MG
15-30 LOZENGE BUCCAL
Status: DISCONTINUED | OUTPATIENT
Start: 2021-06-22 | End: 2021-07-05 | Stop reason: HOSPADM

## 2021-06-22 RX ORDER — LABETALOL HYDROCHLORIDE 5 MG/ML
INJECTION, SOLUTION INTRAVENOUS PRN
Status: DISCONTINUED | OUTPATIENT
Start: 2021-06-22 | End: 2021-06-22

## 2021-06-22 RX ORDER — SODIUM CHLORIDE, SODIUM LACTATE, POTASSIUM CHLORIDE, CALCIUM CHLORIDE 600; 310; 30; 20 MG/100ML; MG/100ML; MG/100ML; MG/100ML
INJECTION, SOLUTION INTRAVENOUS CONTINUOUS
Status: DISCONTINUED | OUTPATIENT
Start: 2021-06-22 | End: 2021-06-24

## 2021-06-22 RX ORDER — LIDOCAINE 40 MG/G
CREAM TOPICAL
Status: DISCONTINUED | OUTPATIENT
Start: 2021-06-22 | End: 2021-07-05 | Stop reason: HOSPADM

## 2021-06-22 RX ORDER — GABAPENTIN 300 MG/1
300 CAPSULE ORAL 3 TIMES DAILY
Status: DISCONTINUED | OUTPATIENT
Start: 2021-06-22 | End: 2021-07-02

## 2021-06-22 RX ADMIN — HUMAN INSULIN 1 UNITS/HR: 100 INJECTION, SOLUTION SUBCUTANEOUS at 12:45

## 2021-06-22 RX ADMIN — FENTANYL CITRATE 50 MCG: 50 INJECTION, SOLUTION INTRAMUSCULAR; INTRAVENOUS at 13:03

## 2021-06-22 RX ADMIN — FENTANYL CITRATE 50 MCG: 50 INJECTION, SOLUTION INTRAMUSCULAR; INTRAVENOUS at 11:58

## 2021-06-22 RX ADMIN — INSULIN ASPART 2 UNITS: 100 INJECTION, SOLUTION INTRAVENOUS; SUBCUTANEOUS at 20:06

## 2021-06-22 RX ADMIN — ACETAMINOPHEN 975 MG: 325 TABLET, FILM COATED ORAL at 06:29

## 2021-06-22 RX ADMIN — ROCURONIUM BROMIDE 30 MG: 10 INJECTION INTRAVENOUS at 10:31

## 2021-06-22 RX ADMIN — HYDROMORPHONE HYDROCHLORIDE 0.3 MG: 1 INJECTION, SOLUTION INTRAMUSCULAR; INTRAVENOUS; SUBCUTANEOUS at 17:04

## 2021-06-22 RX ADMIN — GABAPENTIN 300 MG: 300 CAPSULE ORAL at 22:47

## 2021-06-22 RX ADMIN — POTASSIUM CHLORIDE 10 MEQ: 7.46 INJECTION, SOLUTION INTRAVENOUS at 10:29

## 2021-06-22 RX ADMIN — SODIUM CHLORIDE, POTASSIUM CHLORIDE, SODIUM LACTATE AND CALCIUM CHLORIDE: 600; 310; 30; 20 INJECTION, SOLUTION INTRAVENOUS at 16:15

## 2021-06-22 RX ADMIN — ACETAMINOPHEN 975 MG: 325 TABLET, FILM COATED ORAL at 22:47

## 2021-06-22 RX ADMIN — CEFAZOLIN SODIUM 2 G: 2 INJECTION, SOLUTION INTRAVENOUS at 13:00

## 2021-06-22 RX ADMIN — PHENYLEPHRINE HYDROCHLORIDE 100 MCG: 10 INJECTION INTRAVENOUS at 09:17

## 2021-06-22 RX ADMIN — GABAPENTIN 100 MG: 100 CAPSULE ORAL at 06:29

## 2021-06-22 RX ADMIN — PROPOFOL 120 MG: 10 INJECTION, EMULSION INTRAVENOUS at 08:14

## 2021-06-22 RX ADMIN — FENTANYL CITRATE 50 MCG: 50 INJECTION, SOLUTION INTRAMUSCULAR; INTRAVENOUS at 08:14

## 2021-06-22 RX ADMIN — ALBUMIN (HUMAN): 12.5 SOLUTION INTRAVENOUS at 10:44

## 2021-06-22 RX ADMIN — OXYCODONE HYDROCHLORIDE 5 MG: 5 TABLET ORAL at 20:03

## 2021-06-22 RX ADMIN — ONDANSETRON 4 MG: 2 INJECTION INTRAMUSCULAR; INTRAVENOUS at 15:45

## 2021-06-22 RX ADMIN — LABETALOL HYDROCHLORIDE 10 MG: 5 INJECTION INTRAVENOUS at 13:45

## 2021-06-22 RX ADMIN — GLYCOPYRROLATE 0.2 MG: 0.2 INJECTION, SOLUTION INTRAMUSCULAR; INTRAVENOUS at 09:15

## 2021-06-22 RX ADMIN — LIDOCAINE HYDROCHLORIDE 40 MG: 20 INJECTION, SOLUTION INFILTRATION; PERINEURAL at 08:14

## 2021-06-22 RX ADMIN — Medication 10 MG: at 08:54

## 2021-06-22 RX ADMIN — SODIUM CHLORIDE, POTASSIUM CHLORIDE, SODIUM LACTATE AND CALCIUM CHLORIDE: 600; 310; 30; 20 INJECTION, SOLUTION INTRAVENOUS at 07:52

## 2021-06-22 RX ADMIN — FENTANYL CITRATE 50 MCG: 50 INJECTION, SOLUTION INTRAMUSCULAR; INTRAVENOUS at 06:51

## 2021-06-22 RX ADMIN — Medication 10 MG: at 13:24

## 2021-06-22 RX ADMIN — ROCURONIUM BROMIDE 20 MG: 10 INJECTION INTRAVENOUS at 13:14

## 2021-06-22 RX ADMIN — ONDANSETRON 4 MG: 2 INJECTION INTRAMUSCULAR; INTRAVENOUS at 13:43

## 2021-06-22 RX ADMIN — Medication 10 MG: at 08:41

## 2021-06-22 RX ADMIN — GLYCOPYRROLATE 0.2 MG: 0.2 INJECTION, SOLUTION INTRAMUSCULAR; INTRAVENOUS at 09:13

## 2021-06-22 RX ADMIN — FENTANYL CITRATE 50 MCG: 50 INJECTION, SOLUTION INTRAMUSCULAR; INTRAVENOUS at 11:14

## 2021-06-22 RX ADMIN — ROCURONIUM BROMIDE 50 MG: 10 INJECTION INTRAVENOUS at 08:14

## 2021-06-22 RX ADMIN — ENOXAPARIN SODIUM 40 MG: 40 INJECTION, SOLUTION INTRAVENOUS; SUBCUTANEOUS at 06:59

## 2021-06-22 RX ADMIN — PROCHLORPERAZINE EDISYLATE 5 MG: 5 INJECTION INTRAMUSCULAR; INTRAVENOUS at 17:34

## 2021-06-22 RX ADMIN — BUPIVACAINE HYDROCHLORIDE 20 ML: 2.5 INJECTION, SOLUTION EPIDURAL; INFILTRATION; INTRACAUDAL; PERINEURAL at 06:57

## 2021-06-22 RX ADMIN — SUGAMMADEX 400 MG: 100 INJECTION, SOLUTION INTRAVENOUS at 14:25

## 2021-06-22 RX ADMIN — METRONIDAZOLE 500 MG: 500 INJECTION, SOLUTION INTRAVENOUS at 08:50

## 2021-06-22 RX ADMIN — ALBUMIN (HUMAN): 12.5 SOLUTION INTRAVENOUS at 08:50

## 2021-06-22 RX ADMIN — FENTANYL CITRATE 50 MCG: 50 INJECTION, SOLUTION INTRAMUSCULAR; INTRAVENOUS at 09:40

## 2021-06-22 RX ADMIN — BUPIVACAINE 20 ML: 13.3 INJECTION, SUSPENSION, LIPOSOMAL INFILTRATION at 06:57

## 2021-06-22 RX ADMIN — FENTANYL CITRATE 50 MCG: 50 INJECTION, SOLUTION INTRAMUSCULAR; INTRAVENOUS at 12:39

## 2021-06-22 RX ADMIN — LABETALOL HYDROCHLORIDE 10 MG: 5 INJECTION INTRAVENOUS at 12:36

## 2021-06-22 RX ADMIN — INDOCYANINE GREEN 7.5 MG: KIT INTRAVENOUS at 13:38

## 2021-06-22 RX ADMIN — Medication 2 G: at 08:54

## 2021-06-22 RX ADMIN — FENTANYL CITRATE 50 MCG: 50 INJECTION, SOLUTION INTRAMUSCULAR; INTRAVENOUS at 09:35

## 2021-06-22 RX ADMIN — FENTANYL CITRATE 50 MCG: 50 INJECTION, SOLUTION INTRAMUSCULAR; INTRAVENOUS at 09:05

## 2021-06-22 ASSESSMENT — ACTIVITIES OF DAILY LIVING (ADL): ADLS_ACUITY_SCORE: 15

## 2021-06-22 ASSESSMENT — MIFFLIN-ST. JEOR: SCORE: 1660.5

## 2021-06-22 ASSESSMENT — ENCOUNTER SYMPTOMS: ORTHOPNEA: 0

## 2021-06-22 ASSESSMENT — LIFESTYLE VARIABLES: TOBACCO_USE: 0

## 2021-06-22 NOTE — ANESTHESIA POSTPROCEDURE EVALUATION
Patient: Da Amaya    Procedure(s):  LAPAROSCOPIC transverse colectomy, lysis of adhesions    Diagnosis:Colon cancer (H) [C18.9]  Diagnosis Additional Information: No value filed.    Anesthesia Type:  General    Note:  Disposition: Admission   Postop Pain Control: Uneventful            Sign Out: Well controlled pain   PONV: No   Neuro/Psych: Uneventful            Sign Out: Acceptable/Baseline neuro status   Airway/Respiratory: Uneventful            Sign Out: Acceptable/Baseline resp. status   CV/Hemodynamics: Uneventful            Sign Out: Acceptable CV status; No obvious hypovolemia; No obvious fluid overload   Other NRE:    DID A NON-ROUTINE EVENT OCCUR?            Last vitals:  Vitals:    06/22/21 1700 06/22/21 1715 06/22/21 1730   BP: (!) 148/80 (!) 150/75    Pulse: 67 57    Resp: 16 20 22   Temp:  36.6  C (97.8  F)    SpO2: 94% 95% 97%       Last vitals prior to Anesthesia Care Transfer:  CRNA VITALS  6/22/2021 1421 - 6/22/2021 1521      6/22/2021             NIBP:  140/70    Pulse:  72    ART BP:  144/60    Temp:  36.7  C (98  F)    SpO2:  93 %    Resp Rate (observed):  17    EKG:  Sinus rhythm          Electronically Signed By: Bartolo Eldridge MD  June 22, 2021  5:50 PM

## 2021-06-22 NOTE — PROGRESS NOTES
SURGERY POST-OP CHECK NOTE  06/22/2021 6:07 PM    Patient: Da Amaya  MRN: [id number]    Subjective  No acute events postoperatively. Pain well controlled. Denies nausea, vomiting, chest pain, shortness of breath. No other complaints.      Objective  Temp:  [97.6  F (36.4  C)-98  F (36.7  C)] 97.8  F (36.6  C)  Pulse:  [56-78] 57  Resp:  [10-22] 22  BP: (133-181)/(69-99) 150/75  MAP:  [86 mmHg-89 mmHg] 89 mmHg  Arterial Line BP: (138-144)/(54-56) 144/56  SpO2:  [92 %-97 %] 97 %    General: AAOx4, NAD, lying in bed, appears comfortable  CV: RRR, no murmurs  Pulm: CTAB, breathing comfortably on NC on 3L of O2  Abd: soft, appropriately tender to palpation, non-distended, incisions c/d/i   Extremities: warm, well-perfused without edema  Neuro: facial movement grossly symmetric, moving all extremities spontaneously without apparent deficit    UOP postop:  375ml via rascon since 2pm    Labs:  Recent Labs   Lab 06/22/21  1358 06/18/21  1540 06/18/21  1540   WBC  --   --  6.8   RBC  --   --  5.02   HGB 12.2*   < > 13.4   HCT  --   --  41.8   MCV  --   --  83   MCH  --   --  26.7   MCHC  --   --  32.1   RDW  --   --  14.1   PLT  --   --  202    < > = values in this interval not displayed.       Recent Labs   Lab 06/22/21  1400 06/22/21  1358 06/18/21  1540 06/18/21  1540   NA  --  133   < > 140   POTASSIUM  --  3.5   < > 3.6   CHLORIDE  --   --   --  104   CO2  --   --   --  29   BUN  --   --   --  22   CR  --   --   --  1.44*   GLC  --  225*   < > 136*   BOOGIE  --   --   --  8.6   MAG 2.2  --   --   --     < > = values in this interval not displayed.       Recent Labs   Lab 06/18/21  1540   AST 20   ALT 43   ALKPHOS 92   BILITOTAL 0.6   ALBUMIN 3.7         Assessment/Plan  Da Amaya is a 76 year old male with a h/o Hypertension, tobacco use, cataracts, elevated PSA, stage 3 renal disease, history of MI s/p CABG, hyperlipidemia, type 2 diabetes, history of colon cancer, unresectable polyp, POD#0 s/p transverse  colectomy, extenisve lysis of adhesions, takedown of splenic flexure, and intra op colonoscopy, doing well.     Neuro:      - Pain: Well, controlled, continue current pain regimen  CV: HDS, no issues  Pulm: satting on NC on 3L, encourage IS, wean as toelrated  FEN/GI:      - IVF: LR at 75cc/hr     - Diet: CLD     - Bowel regimen: PRN     - Nausea control: Zofran PRN      - BMP in am  Renal: UOPA, voiding through Haynes  Heme: no s/sx bleeding, CBC in am  ID: afebrile, no leukocytosis, no abx  PPx: OOB, IS, SCDs,   Dispo: 7C    CHAI Keating, MS  Surgery, PGY-1

## 2021-06-22 NOTE — ANESTHESIA PROCEDURE NOTES
Airway         Procedure Start/Stop Times: 6/22/2021 8:14 AM  Staff -        Other Anesthesia Staff: Jose Raul Darnell       Performed By: SRNAIndications and Patient Condition       Indications for airway management: leticia-procedural       Induction type:intravenous       Mask difficulty assessment: 2 - vent by mask + OA or adjuvant +/- NMBA    Final Airway Details       Final airway type: endotracheal airway       Successful airway: ETT - single and Oral  Endotracheal Airway Details        ETT size (mm): 8.0       Cuffed: yes       Successful intubation technique: video laryngoscopy       VL Blade Size: Glidescope 3       Grade View of Cords: 2       Adjucts: stylet       Position: Right       Measured from: gums/teeth       Secured at (cm): 22    Post intubation assessment        Placement verified by: capnometry, equal breath sounds and chest rise        Number of attempts at approach: 1       Secured with: pink tape       Ease of procedure: easy       Dentition: Intact    Medication(s) Administered   Medication Administration Time: 6/22/2021 8:16 AM

## 2021-06-22 NOTE — ANESTHESIA PREPROCEDURE EVALUATION
"Anesthesia Pre-Procedure Evaluation    Patient: Da Amaya   MRN: [id number] : 1945        Preoperative Diagnosis: Colon cancer (H) [C18.9]   Procedure : Procedure(s):  LAPAROSCOPIC transverse colectomy     Past Medical History:   Diagnosis Date     BPH (benign prostatic hyperplasia)      Chronic bronchitis (H)      Colon cancer (H)      Coronary artery disease      Diabetes (H)      Heart attack (H)      Hypertension      Renal disease       Past Surgical History:   Procedure Laterality Date     COLON SURGERY       COLONOSCOPY       CORONARY ARTERY BYPASS        Allergies   Allergen Reactions     Neosporin [Neomycin-Polymyx-Gramicid] Rash and Blisters      Social History     Tobacco Use     Smoking status: Former Smoker     Quit date:      Years since quittin.5     Smokeless tobacco: Never Used   Substance Use Topics     Alcohol use: Yes     Frequency: Monthly or less      Wt Readings from Last 1 Encounters:   21 94.6 kg (208 lb 8.9 oz)        Anesthesia Evaluation   Pt has had prior anesthetic. Type: General.    No history of anesthetic complications       ROS/MED HX  ENT/Pulmonary: Comment: Chronic intermittent cough x 2+ years worse in spring; no wheezing   (-) tobacco use and sleep apnea   Neurologic:  - neg neurologic ROS     Cardiovascular:     (+) hypertension--CAD -past MI CABG-date: ; following MI. -Previous cardiac testing   Echo: Date: Results:    Stress Test: Date: Results:    ECG Reviewed: Date:  Results:  NSB; NSST changes; no significant change compared with ECG from ~  Cath: Date: Results:   (-) angina, orthopnea/PND, syncope, angina and murmur   METS/Exercise Tolerance: 3 - Able to walk 1-2 blocks without stopping Comment: Very sedentary; not limited by angina, SOB; \"out of shape\"   Hematologic:  - neg hematologic  ROS     Musculoskeletal:  - neg musculoskeletal ROS     GI/Hepatic:     (+) GERD, Asymptomatic on medication, bowel prep,     Renal/Genitourinary:   "   (+) renal disease, type: CRI, Pt does not require dialysis,     Endo:     (+) type II DM, Last HgA1c: ~9, date: 6/21, Using insulin, Normal glucose range: ; insulin started 6/18; none since 6/20, Obesity,     Psychiatric/Substance Use:  - neg psychiatric ROS     Infectious Disease: Comment: COVID Neg 6/18      Malignancy: Comment: H/o colon CA s/p resection 2 years ago (anes record NA) without issues; now with suspicious polyp for hemicolectomy  (+) Malignancy, History of GI.GI CA  Active status post Surgery.        Other:            Physical Exam    Airway        Mallampati: II   TM distance: > 3 FB   Neck ROM: full   Mouth opening: > 3 cm    Respiratory Devices and Support         Dental       (+) missing      Cardiovascular          Rhythm and rate: regular and normal (-) no murmur    Pulmonary           breath sounds clear to auscultation       Other findings: Flat affect    OUTSIDE LABS:  CBC:   Lab Results   Component Value Date    WBC 6.8 06/18/2021    WBC 7.0 06/02/2021    HGB 13.4 06/18/2021    HGB 13.6 06/02/2021    HCT 41.8 06/18/2021    HCT 41.9 06/02/2021     06/18/2021     06/02/2021     BMP:   Lab Results   Component Value Date     06/18/2021     06/02/2021    POTASSIUM 3.4 06/22/2021    POTASSIUM 3.6 06/18/2021    CHLORIDE 104 06/18/2021    CHLORIDE 101 06/02/2021    CO2 29 06/18/2021    CO2 28 06/02/2021    BUN 22 06/18/2021    BUN 28 06/02/2021    CR 1.44 (H) 06/18/2021    CR 1.48 (H) 06/02/2021     (H) 06/18/2021     (H) 06/02/2021     COAGS: No results found for: PTT, INR, FIBR  POC:   Lab Results   Component Value Date     (H) 06/22/2021     HEPATIC:   Lab Results   Component Value Date    ALBUMIN 3.7 06/18/2021    PROTTOTAL 7.4 06/18/2021    ALT 43 06/18/2021    AST 20 06/18/2021    ALKPHOS 92 06/18/2021    BILITOTAL 0.6 06/18/2021     OTHER:   Lab Results   Component Value Date    A1C 8.8 (H) 06/18/2021    BOOGIE 8.6 06/18/2021        Anesthesia Plan    ASA Status:  3   NPO Status:  NPO Appropriate    Anesthesia Type: General.     - Airway: ETT   Induction: Intravenous, Propofol.   Maintenance: Balanced.   Techniques and Equipment:     - Lines/Monitors: 2nd IV, Arterial Line     Consents    Anesthesia Plan(s) and associated risks, benefits, and realistic alternatives discussed. Questions answered and patient/representative(s) expressed understanding.     - Discussed with:  Patient      - Specific Concerns: Aspiration, MI, CVA.     - Extended Intubation/Ventilatory Support Discussed: No.      - Patient is DNR/DNI Status: No    Use of blood products discussed: Yes.     - Discussed with: Patient.     - Consented: consented to blood products            Reason for refusal: other.     Postoperative Care    Pain management: IV analgesics, Peripheral nerve block (Single Shot).   PONV prophylaxis: Ondansetron (or other 5HT-3)     Comments:    Patient seen and examined by me and available records reviewed. Patient has multiple cardiac risk factors in setting of poorly controlled DM; maintained on b-blockade. Will place art line for closer monitoring.   Anesthetic options and risks discussed with patient who agrees to proceed.      Jyoti Mirza MD  6/22/2021              Jyoti Mirza MD

## 2021-06-22 NOTE — OR NURSING
Dr Oscar Muller at bedside at 1730.  Patient began retching with nausea. He stated he did not want the patient putting too much stress on the new incisions.  Dr. Chavis notified and 5 mg compazine given.  At this time the nausea has passed.

## 2021-06-22 NOTE — OR NURSING
Pre-op TAP block complete with 50 mcg IV Fentanyl. VSS, pt tolerated well, will continue to monitor.

## 2021-06-22 NOTE — OR NURSING
LUBNA Mirza notified of  in PACU. Orders for inpatient - endocrinology consult and sliding scale. Patient to continue to have 2unit/hr insulin gtt infusing for PACU stay. Discontinue when leaving PACU for floor to manage.

## 2021-06-22 NOTE — OP NOTE
[facility] Colorectal Surgery Operative Report    PREOPERATIVE DIAGNOSIS:  1. Hypertension  2. History of tobacco use  3. Cataracts  4. Elevated PSA  5. Stage 3 renal disease  6. History of MI s/p CABG  7. Hyperlipidemia  8. Type 2 diabetes  9. Obesity  10. History of colon cancer  11. Unresectable colonic polyp      POSTOPERATIVE DIAGNOSIS:   1. Hypertension  2. History of tobacco use  3. Cataracts  4. Elevated PSA  5. Stage 3 renal disease  6. History of MI s/p CABG  7. Hyperlipidemia  8. Type 2 diabetes  9. Obesity  10. History of colon cancer  11. Unresectable colonic polyp    PROCEDURE:  1. Laparoscopic transverse colectomy  2. Extensive lysis of adhesions, requiring >90 minutes.  3. Takedown of splenic flexure  4. Intraoperative indocyanine green testing  5. Intraoperative colonoscopy    ANESTHESIA: General endotracheal anesthesia plus local anesthesia.    SURGEON:  Oscar Muller M.D.    ASSISTANT(S):  Michael Cheng, CRS Fellow     Price Hernandez, PGY1    INDICATIONS FOR PROCEDURE  Da Amaya is a 76 year old male with previous history of laparoscopic right hemicolectomy for colon cancer, with surveillance revealing a recurrent unresectable polyp concerning for cancer recurrence. While biopsy did not show definite cancer, I was concerned regarding its presence, especially so acutely following his right hemicolectomy. He underwent genetic counseling and testing which did not reveal any known mutations attributing to high risk colonic cancer. Thus, I recommended recurrent ileocolic resection I thoroughly discussed the risks, benefits, and alternatives of operative treatment with the patient and he agreed to proceed.    General risks related to abdominal surgery were reviewed with the patient. These include, but are not limited to, death, myocardial infarction, pneumonia, urinary tract infection, deep venous thrombosis with or without pulmonary embolus, abdominal infection from bowel injury or abscess, fistula,  "anastomotic leak that may require reoperation and a stoma, ureteral injury, bowel obstruction, wound infection, and bleeding.    OPERATIVE PROCEDURE: After obtaining informed consent, the patient was brought to the operating room and placed in the supine position. Appropriate preoperative mechanical and chemical deep venous thrombosis prophylaxis, as well as preoperative prophylactic parenteral antibiotics were given. General endotracheal anesthesia was gently induced. Bilateral lower extremity pneumatic compression devices were applied and all pressure points were cushioned. A Haynes catheter was inserted without difficulty. The abdomen was then preped and draped in the standard sterile fashion. After a \"time-out\" was performed, a 12mm infraumbilical incision was made and a 12mm trocar was inserted in an open fashion. Pneumoperitoneum was established with CO2 without difficulty. A 10mm 30 degree angle laparoscope was then used to explore the peritoneal cavity. No evidence of bleeding, bowel injury or metastatic disease was visualized. Additional trocars were placed at the suprapubic area (5mm) and left lower quadrant (5mm), and right lower quadrant (5 mm)under direct vision. There were dense adhesions throughout the abdomen, most notably in the superior abdomen, with the ileocolic anastomosis adhered to the anterior abdominal wall. We began by carefully taking down these adhesions, which required an additional 90 minutes of operating. We did so with a combination scissors and ligasure. Notably, the underlying duodenum and stomach was identified and protected the entire dissection.       The small bowel and omentum were then displaced towards the left side of the peritoneal cavity and the and the ileocolic anastomosis was identified and mobilized free from its surrounding adhesions and attachments. We then proceeded to completely mobilize the transverse colon taking down the entire length of the gastrocolic ligament. " Given the tension on the distal transverse colon, and in an effort to avoid any tension with extracorporialization, we proceeded to take down the splenic flexure. We proceeded with a lateral to medial dissection up and around the flexure, completely freeing the splenic flexure from its attachments.     After obtaining sufficient mobilization of the colon to perform our resection, a 5-cm supraumbilical extraction incision was made and a wound protector was placed. The ileocolic anastomosis and the transverse colon were brought through the incision. We also extracted omentum which was firmly adherent to the anastomosis. We stapled proximal on the ileum beyond the anastomosis with a EVELYN 75 mm blue load. We then performed an intraoperative colonoscopy and identified the location of the polyp. An area 7 cm distal from the polyp was then identified on the transverse colon, and a mesenteric window was made. The distal bowel was then transected with a EVELYN 75 mm blue load. The corresponding mesentery was then resected with the ligasure. The specimen, including the ileocolic anastomosis, transverse colon, and omentum were sent to pathology.     The terminal ileum and transverse colon were aligned, making sure to not incorporate mesentery or adjacent tissues, and a stapled side-to-side functional end-to-end ileo-ileal anastomosis was made with a EVELYN 75 surgical stapler. The anastomosis was evaluated through the common enterotomy and no bleeding was visualized. After this, a TA 90 surgical stapler was used to transect the remaining colon and ileocolic anastomosis, making sure the EVELYN staple lines were not aligned. The anastomosis appeared to be well vascularized, widely patent, and without tension or torsion. Areas of bleeding at the anastomosis were reinforced with 3-0 PDS. The crotch was reinforced with 3-0 PDS x2. Next, indocyanine green was injected with appropriate evidence of microperfusion to the anastomosis.    The  bowel was then returned to the peritoneal cavity. The peritoneal cavity was irrigated and suctioned. There was no evidence of bleeding or bowel injury. All trocars were removed with no signs of bleeding. Pneumoperitoneum was deflated. Instrument, sponge, and needle counts were all correct, as reported to me, at this time. The supraumbilical extraction incision fascia was re-approximated with running 0 PDS suture x2. Wounds were irrigated and dried, and hemostasis was corroborated. Skin incisions were re-approximated with running subcuticular 4-0 Monocryl sutures and Dermabond was applied. The patient tolerated the procedure well.    COMPLICATIONS: none.    ESTIMATED BLOOD LOSS: 100 mL.    REPLACEMENT:     - Crystalloid: 1400 mL.     - Colloid: 500 mL.     - Blood products: none.    URINE OUTPUT:     -  340 mL    SPECIMEN(S):   1. Ileocolic anastomosis en bloc with transverse colon  2. Omentum    OPERATIVE COUNT: Complete.    OPERATIVE FINDINGS:                     1. Significant intraabdominal adhesions requiring an addition 90 minutes of lysis and dissection.  2. No metastatic disease.  3. Polyp identified by colonoscopy, resected with appropriate margins, 6.5 cm from distal margin. No other polyps seen on intraoperative colonoscopy.      Oscar Muller MD  Division of Colon and Rectal Surgery  [facility]  [phone]

## 2021-06-22 NOTE — ANESTHESIA CARE TRANSFER NOTE
Patient: Da Amaya    Procedure(s):  LAPAROSCOPIC transverse colectomy, lysis of adhesions    Diagnosis: Colon cancer (H) [C18.9]  Diagnosis Additional Information: No value filed.    Anesthesia Type:   No value filed.     Note:    Oropharynx: spontaneously breathing  Level of Consciousness: drowsy  Oxygen Supplementation: nasal cannula  Level of Supplemental Oxygen (L/min / FiO2): 4  Independent Airway: airway patency satisfactory and stable  Dentition: dentition unchanged  Vital Signs Stable: post-procedure vital signs reviewed and stable  Report to RN Given: handoff report given  Patient transferred to: PACU    Handoff Report: Identifed the Patient, Identified the Reponsible Provider, Reviewed the pertinent medical history, Discussed the surgical course, Reviewed Intra-OP anesthesia mangement and issues during anesthesia, Set expectations for post-procedure period and Allowed opportunity for questions and acknowledgement of understanding      Vitals: (Last set prior to Anesthesia Care Transfer)  CRNA VITALS  6/22/2021 1421 - 6/22/2021 1459      6/22/2021             NIBP:  140/70    Pulse:  72    ART BP:  144/60    Temp:  36.7  C (98  F)    SpO2:  93 %    Resp Rate (observed):  17    EKG:  Sinus rhythm        Electronically Signed By: ARON Stinson CRNA  June 22, 2021  2:59 PM

## 2021-06-22 NOTE — ANESTHESIA PROCEDURE NOTES
TAP Procedure Note  Pre-Procedure   Staff -        Anesthesiologist:  Karl Shannon MD       Resident/Fellow: Jesus Arndt DO       Performed By: resident       Location: OR       Procedure Start/Stop Times: 6/22/2021 6:48 AM and 6/22/2021 6:58 AM       Pre-Anesthestic Checklist: patient identified, IV checked, site marked, risks and benefits discussed, informed consent, monitors and equipment checked, pre-op evaluation, at physician/surgeon's request and post-op pain management  Timeout:       Correct Patient: Yes        Correct Procedure: Yes        Correct Site: Yes        Correct Position: Yes        Correct Laterality: Yes        Site Marked: Yes  Procedure Documentation  Procedure: TAP       Diagnosis: POST OPERATIVE PAIN       Laterality: bilateral       Patient Position: supine       Patient Prep/Sterile Barriers: sterile gloves, mask       Skin prep: Chloraprep       Needle Type: short bevel       Needle Gauge: 21.        Needle Length (millimeters): 110        Ultrasound guided       1. Ultrasound was used to identify targeted nerve, plexus, vascular marker, or fascial plane and place a needle adjacent to it in real-time.       2. Ultrasound was used to visualize the spread of anesthetic in close proximity to the above referenced structure.       3. A permanent image is entered into the patient's record.    Assessment/Narrative         The placement was negative for: blood aspirated, painful injection and site bleeding       Paresthesias: No.     Bolus given via needle..        Secured via.        Insertion/Infusion Method: Single Shot       Complications: none       Injection made incrementally with aspirations every 5 mL.

## 2021-06-22 NOTE — BRIEF OP NOTE
[facility]    Brief Operative Note    Pre-operative diagnosis: Colon cancer (H) [C18.9]  Post-operative diagnosis Recurrent unresectable polyp    Procedure: Procedure(s):  LAPAROSCOPIC transverse colectomy, lysis of adhesions  Surgeon: Surgeon(s) and Role:     * Oscar Muller MD - Primary  Anesthesia: Combined General with Block   Estimated blood loss: 100 mL  Drains: None  Specimens:   ID Type Source Tests Collected by Time Destination   A : ileocolic anastomosis, transverse colon, and omentum Tissue Colon SURGICAL PATHOLOGY EXAM Oscar Muller MD 6/22/2021  1:09 PM      Findings:     1. Significant intraabdominal adhesions requiring an addition 75 minutes of lysis of adhesions.  2. No metastatic disease.  3. Polyp identified, resected with appropriate margins, 6.5 cm from distal margin.  Complications: None.  Implants: None      Oscar Muller MD  Division of Colon and Rectal Surgery  [facility]  [phone]

## 2021-06-23 ENCOUNTER — APPOINTMENT (OUTPATIENT)
Dept: PHYSICAL THERAPY | Facility: CLINIC | Age: 76
DRG: 330 | End: 2021-06-23
Attending: SURGERY
Payer: MEDICARE

## 2021-06-23 LAB
ANION GAP SERPL CALCULATED.3IONS-SCNC: 8 MMOL/L (ref 3–14)
BUN SERPL-MCNC: 26 MG/DL (ref 7–30)
CALCIUM SERPL-MCNC: 7.3 MG/DL (ref 8.5–10.1)
CHLORIDE SERPL-SCNC: 100 MMOL/L (ref 94–109)
CO2 SERPL-SCNC: 25 MMOL/L (ref 20–32)
CREAT SERPL-MCNC: 1.34 MG/DL (ref 0.66–1.25)
ERYTHROCYTE [DISTWIDTH] IN BLOOD BY AUTOMATED COUNT: 14.4 % (ref 10–15)
GFR SERPL CREATININE-BSD FRML MDRD: 52 ML/MIN/{1.73_M2}
GLUCOSE BLDC GLUCOMTR-MCNC: 131 MG/DL (ref 70–99)
GLUCOSE BLDC GLUCOMTR-MCNC: 158 MG/DL (ref 70–99)
GLUCOSE BLDC GLUCOMTR-MCNC: 163 MG/DL (ref 70–99)
GLUCOSE BLDC GLUCOMTR-MCNC: 190 MG/DL (ref 70–99)
GLUCOSE SERPL-MCNC: 209 MG/DL (ref 70–99)
HCT VFR BLD AUTO: 24.6 % (ref 40–53)
HGB BLD-MCNC: 7.4 G/DL (ref 13.3–17.7)
HGB BLD-MCNC: 8 G/DL (ref 13.3–17.7)
MAGNESIUM SERPL-MCNC: 2.1 MG/DL (ref 1.6–2.3)
MCH RBC QN AUTO: 26.7 PG (ref 26.5–33)
MCHC RBC AUTO-ENTMCNC: 32.5 G/DL (ref 31.5–36.5)
MCV RBC AUTO: 82 FL (ref 78–100)
PLATELET # BLD AUTO: 183 10E9/L (ref 150–450)
POTASSIUM SERPL-SCNC: 3.8 MMOL/L (ref 3.4–5.3)
RBC # BLD AUTO: 3 10E12/L (ref 4.4–5.9)
SODIUM SERPL-SCNC: 133 MMOL/L (ref 133–144)
TROPONIN I SERPL-MCNC: <0.015 UG/L (ref 0–0.04)
WBC # BLD AUTO: 8.3 10E9/L (ref 4–11)

## 2021-06-23 PROCEDURE — 85018 HEMOGLOBIN: CPT | Performed by: PHYSICIAN ASSISTANT

## 2021-06-23 PROCEDURE — 99223 1ST HOSP IP/OBS HIGH 75: CPT | Mod: 24 | Performed by: PHYSICIAN ASSISTANT

## 2021-06-23 PROCEDURE — 258N000003 HC RX IP 258 OP 636: Performed by: STUDENT IN AN ORGANIZED HEALTH CARE EDUCATION/TRAINING PROGRAM

## 2021-06-23 PROCEDURE — 250N000013 HC RX MED GY IP 250 OP 250 PS 637: Performed by: STUDENT IN AN ORGANIZED HEALTH CARE EDUCATION/TRAINING PROGRAM

## 2021-06-23 PROCEDURE — 80048 BASIC METABOLIC PNL TOTAL CA: CPT | Performed by: SURGERY

## 2021-06-23 PROCEDURE — 97161 PT EVAL LOW COMPLEX 20 MIN: CPT | Mod: GP

## 2021-06-23 PROCEDURE — 85027 COMPLETE CBC AUTOMATED: CPT | Performed by: SURGERY

## 2021-06-23 PROCEDURE — 250N000013 HC RX MED GY IP 250 OP 250 PS 637: Performed by: SURGERY

## 2021-06-23 PROCEDURE — 83735 ASSAY OF MAGNESIUM: CPT | Performed by: SURGERY

## 2021-06-23 PROCEDURE — 97530 THERAPEUTIC ACTIVITIES: CPT | Mod: GP

## 2021-06-23 PROCEDURE — 999N001017 HC STATISTIC GLUCOSE BY METER IP

## 2021-06-23 PROCEDURE — 36415 COLL VENOUS BLD VENIPUNCTURE: CPT | Performed by: SURGERY

## 2021-06-23 PROCEDURE — 36415 COLL VENOUS BLD VENIPUNCTURE: CPT | Performed by: PHYSICIAN ASSISTANT

## 2021-06-23 PROCEDURE — 84484 ASSAY OF TROPONIN QUANT: CPT | Performed by: SURGERY

## 2021-06-23 PROCEDURE — 250N000012 HC RX MED GY IP 250 OP 636 PS 637: Performed by: PHYSICIAN ASSISTANT

## 2021-06-23 PROCEDURE — 120N000002 HC R&B MED SURG/OB UMMC

## 2021-06-23 RX ADMIN — PANTOPRAZOLE SODIUM 40 MG: 40 TABLET, DELAYED RELEASE ORAL at 09:06

## 2021-06-23 RX ADMIN — INSULIN GLARGINE 23 UNITS: 100 INJECTION, SOLUTION SUBCUTANEOUS at 10:24

## 2021-06-23 RX ADMIN — ACETAMINOPHEN 975 MG: 325 TABLET, FILM COATED ORAL at 09:06

## 2021-06-23 RX ADMIN — GABAPENTIN 300 MG: 300 CAPSULE ORAL at 09:06

## 2021-06-23 RX ADMIN — OXYCODONE HYDROCHLORIDE 5 MG: 5 TABLET ORAL at 15:17

## 2021-06-23 RX ADMIN — ACETAMINOPHEN 975 MG: 325 TABLET, FILM COATED ORAL at 15:16

## 2021-06-23 RX ADMIN — METOPROLOL SUCCINATE 100 MG: 100 TABLET, EXTENDED RELEASE ORAL at 09:06

## 2021-06-23 RX ADMIN — INSULIN ASPART 1 UNITS: 100 INJECTION, SOLUTION INTRAVENOUS; SUBCUTANEOUS at 11:54

## 2021-06-23 RX ADMIN — OXYCODONE HYDROCHLORIDE 5 MG: 5 TABLET ORAL at 02:24

## 2021-06-23 RX ADMIN — INSULIN ASPART 2 UNITS: 100 INJECTION, SOLUTION INTRAVENOUS; SUBCUTANEOUS at 09:05

## 2021-06-23 RX ADMIN — SODIUM CHLORIDE, POTASSIUM CHLORIDE, SODIUM LACTATE AND CALCIUM CHLORIDE: 600; 310; 30; 20 INJECTION, SOLUTION INTRAVENOUS at 18:52

## 2021-06-23 RX ADMIN — INSULIN ASPART 1 UNITS: 100 INJECTION, SOLUTION INTRAVENOUS; SUBCUTANEOUS at 19:05

## 2021-06-23 RX ADMIN — ACETAMINOPHEN 975 MG: 325 TABLET, FILM COATED ORAL at 20:36

## 2021-06-23 RX ADMIN — GABAPENTIN 300 MG: 300 CAPSULE ORAL at 20:36

## 2021-06-23 RX ADMIN — TAMSULOSIN HYDROCHLORIDE 0.4 MG: 0.4 CAPSULE ORAL at 09:06

## 2021-06-23 RX ADMIN — ATORVASTATIN CALCIUM 40 MG: 40 TABLET, FILM COATED ORAL at 09:06

## 2021-06-23 RX ADMIN — SODIUM CHLORIDE, POTASSIUM CHLORIDE, SODIUM LACTATE AND CALCIUM CHLORIDE 1000 ML: 600; 310; 30; 20 INJECTION, SOLUTION INTRAVENOUS at 04:09

## 2021-06-23 RX ADMIN — GABAPENTIN 300 MG: 300 CAPSULE ORAL at 15:17

## 2021-06-23 ASSESSMENT — ACTIVITIES OF DAILY LIVING (ADL)
ADLS_ACUITY_SCORE: 17
ADLS_ACUITY_SCORE: 18
ADLS_ACUITY_SCORE: 17
ADLS_ACUITY_SCORE: 19

## 2021-06-23 NOTE — PATIENT INSTRUCTIONS
Assessing Cancer Risk  Only about 5-10% of cancers are thought to be due to an inherited cancer susceptibility gene.    These families often have:  ? Several people with the same or related types of cancer  ? Cancers diagnosed at a young age (before age 50)  ? Individuals with more than one primary cancer  ? Multiple generations of the family affected with cancer    Comprehensive Colon Cancer Panel  We each inherit two copies of every gene in our bodies: one from our mother, and one from our father.  Each gene has a specific job to do.  When a gene has a mistake or  mutation  in it, it does not work like it should.      This handout will review common hereditary colon cancer syndromes, and other genes related to an increased risk for colon cancer.  The genes that will be discussed in this handout are: APC, BMPR1A, CDH1, CHEK2, EPCAM, GREM1, MLH1, MSH2, MSH6, MUTYH, PMS2, POLD1, POLE, PTEN, SMAD4, STK11, and TP53.  These genes are clinically actionable, meaning there are published guidelines for cancer screening and management for individuals who are found to carry mutations in these genes. Inheriting a mutation does not mean a person will develop cancer, but it does significantly increase his or her risk above the general population risk.      Familial Adenomatous Polyposis (FAP)  FAP is a hereditary cancer syndrome caused by mutations in the APC gene. The condition is known to cause hundreds to thousands of adenomatous polyps in the colon, creating a  carpet  of polyps. Some individuals have what is called attenuated FAP (AFAP), a milder form of FAP with fewer polyps and typically later onset. Individuals with an APC gene mutation are at an increased risk for colon, thyroid, and duodenal cancers, as well as several other types of cancer1.  Other features of this condition may include: osteomas, dental anomalies, benign skin lesions, CHRPE ( freckle  on the inside of the eye), and desmoid tumors.      Lifetime  Cancer Risks     Cancer Type General Population FAP   Colon  5% near 100%   Thyroid (papillary) 1% 1-12%   Duodenal <1% 5%   Liver  <1% 1-2% before age 5   Pancreas <1% 1%   Stomach <1% 1%       Juvenile Polyposis Syndrome (JPS)  JPS is characterized by hamartomatous polyps, called juvenile polyps, in the gastrointestinal tract.  Juvenile  refers to the type of polyps seen in this hereditary cancer condition, not the age of onset. Currently, mutations in two genes are known to cause JPS: BMPR1A and SMAD4. Of individuals clinically diagnosed with JPS, only 40% have an identifiable mutation in one of these genes, suggesting there are other genes that cause JPS that have not been discovered yet. Individuals with JPS are at an increased risk for colon cancer and stomach cancer 2,3,4. Pancreatic and small bowel cancers have also been reported in JPS, but the actual risks are unknown.         Lifetime Cancer Risks    Cancer Type General Population JPS   Colon 5% 40-50%   Gastric/Duodenal <1% 10-21%     Some individuals with SMAD4 mutations have a condition called JPS/HHT (Juvenile Polyposis/Hereditary Hemorrhagic Telangiectasia) where in addition to JPS, individuals may have nose bleeds and clotting issues.     Hereditary Diffuse Gastric Cancer (HDGC)  Currently, mutations in one gene are known to cause Hereditary Diffuse Gastric Cancer: CDH1.  Individuals with HDGC are at increased risk for diffuse gastric cancer and lobular breast cancer. Of people diagnosed with HDGC, 30-50% have a mutation in the CDH1 gene.  This suggests there are likely mutations in other genes that may cause HDGC that have not been identified yet. Individuals with HDGC may also be at increased risk for colon cancer.      Lifetime Cancer Risks    Cancer Type General Population HDGC   Diffuse Gastric <1% 67-83%   Breast 12% 39-52%     Carty syndrome  Mutations in five different genes are known to cause Carty syndrome: MLH1, MSH2, MSH6, PMS2, and  EPCAM. Individuals with Carty syndrome have an increased risk for colon, uterine, ovarian, small bowel, stomach, urinary tract, and brain cancer, as well as several other types of cancer. The exact lifetime cancer risks are dependent upon the gene in which the mutation was identified.      Lifetime Cancer Risks    Cancer Type General Population Carty syndrome   Colon 5% 12-52%   Uterine 2-3% Up to 57%   Stomach <1% Up to 16%   Ovarian 2% Up to 38%   Urinary tract <1% 1-7%   Hepatobiliary tract <1% 1-4%   Small bowel <1% Up to 11%   Brain/CNS <1% Not well established   Pancreas <1% Up to 6%       MUTYH-Associated Polyposis (MAP)  MAP is a hereditary cancer syndrome caused by mutations in the MUTYH gene. Unlike the other hereditary cancer genes discussed in this handout, two mutations in the MUTYH gene cause MAP and increase cancer risk. Those affected with MAP typically have between  adenomatous polyps. This syndrome also increases the risk for colon and duodenal cancer. Current research suggests that other cancers may be associated with MUTYH mutations, as well. The table below includes the risk that someone with two MUTYH gene mutations would develop cancer in their lifetime; of note, there is also an increased colon cancer risk for individuals who carry only one MUTYH gene mutation5,6,7.       Lifetime Cancer Risks    Cancer Type General Population MAP   Colon 5% %   Duodenal  <1% 5%       Cowden syndrome  Cowden syndrome is a hereditary condition that increases the risk for breast, thyroid, endometrial, colon, and kidney cancer.  A single mutation in the PTEN gene causes Cowden syndrome and increases cancer risk.  The table below shows the chance that someone with a PTEN mutation would develop cancer in their lifetime8,9.  Other benign features seen in some individuals with Cowden syndrome include benign skin lesions (facial papules, keratoses, lipomas), learning disabilities, autism, thyroid nodules,  hamartomatous colon polyps, and larger head size.      Lifetime Cancer Risks   Cancer Type General Population Cowden Syndrome   Breast 12% 25-50%*   Thyroid 1% 35%   Renal 1-2% 35%   Endometrial 2-3% 28%   Colon 5% 9%   Melanoma 2-3% >5%     *One recent study found breast cancer risk to be increased to 85%    Peutz-Jeghers syndrome (PJS)  PJS is a hereditary cancer syndrome caused by mutations in the STK11 gene. This condition can be distinguished from other hereditary syndromes by the presence of hamartomatous polyps in the gastrointestinal tract and freckles present in unusual places such as the hands, feet, neck, and lips. Individuals with Peutz-Jeghers syndrome have an increased risk for colon, breast, pancreatic, and other cancers3.  Men are at risk for testicular tumors which can affect hormones in the body. Women are at risk for sex cord tumors of the ovaries and a rare aggressive type of cervical cancer.     Lifetime Cancer Risks    Cancer Type General Population PJS   Breast 12% 45-50%   Colon 5% 39%   Stomach <1% 29%   Pancreas 1.5% 11-36%   Small Intestine <1% 13%     Ovarian  2%  18%   Lung 6-7% 15-17%     Additional Genes Associated with Increased Colon Cancer Risk  CHEK2  CHEK2 is a moderate-risk breast cancer gene.  Women who have a mutation in CHEK2 have around a 2-fold increased risk for breast cancer compared to the general population, and this risk may be higher depending upon family history.12,13,14.  Mutations in CHEK2 have also been shown to increase the risk of a number of other cancers, including colon and prostate, however these cancer risks are currently not well understood.     GREM1  GREM1 is a moderate-risk colon polyposis gene. Duplications of this gene are more commonly found in individuals with Ashkenazi [place]ian [id number]. Mutations in GREM1 are associated with colon polyps and therefore an increased risk of colon cancer; however the estimated cancer risk is not well [id number].      POLD1 and POLE  POLD1 and POLE are moderate-risk colon cancer genes. Carriers of a mutation in one of these genes increases the lifetime risk of colorectal [id number],18,19,20. Mutations in these genes may also be associated with increased risk for other cancers including: endometrial cancer, duodenal adenomas and carcinomas, and brain tumors.    TP53  Li Fraumeni syndrome (LFS) is a hereditary cancer predisposition syndrome. LFS is caused by a mutation in the TP53 gene. A single mutation in one of the copies of TP53 increases the risk for multiple cancers. Individuals with LFS are at an increased risk for developing cancer at a young age. The general lifetime risk for development of cancer is 50% by age 30 and 90% by age 60.      Core Cancers: Sarcomas, Breast, Brain, Lung, Leukemias/Lymphomas, Adrenocortical carcinomas  Other Cancers: Gastrointestinal, Thyroid, Skin, Genitourinary    Genetic Testing  Genetic testing involves a simple blood test and will look at the genetic information in genes associated with an increased risk of colon cancer. The tests look for any harmful mutations that are associated with increased cancer risk.  If possible, it is recommended that the person(s) who has had cancer be tested before other family members.  That person will give us the most useful information about whether or not a specific gene mutation is associated with the cancer in the family.     Results  There are three possible results from genetic testing:  ? Positive--a harmful mutation was identified  ? Negative--no mutation was identified  ? Variant of unknown significance--a variation in one of the genes was identified, but it is unclear how this impacts cancer risk in the family  Advantages and Disadvantages  There are advantages and disadvantages to genetic testing of these genes.    Advantages  ? May clarify your cancer risk  ? Can help you make medical decisions  ? May explain the cancers in your family  ? May  give useful information to your family members (if you share your results)    Disadvantages  ? Possible negative emotional impact of learning about inherited cancer risk  ? Uncertainty in interpreting a negative test result in some situations  ? Possible genetic discrimination concerns (see below)    Inheritance   Most mutations in the genes outlined above are inherited in an autosomal dominant pattern.  This means that if a parent has a mutation, each of his or her children will have a 50% chance of inheriting that same mutation.  Therefore, each child--male or female--would have a 50% chance of being at increased risk for developing cancer.                                            Image obtained from [employer], 2013     In the case of MUTYH-Associated Polyposis (MAP) this hereditary cancer syndrome is inherited in an autosomal recessive pattern. This means that each parent of an individual with MAP is a carrier of MAP, meaning that they have only one mutation in MUTYH. They still have one functioning copy of their gene.  Carriers are at a slightly higher risk for colon cancer than the general population. If each parent is a carrier for MAP, they have a 25% of having a child who is affected with MAP, meaning the child inherited both gene mutations - one from each parent.       Image obtained from [employer], 2016    Genetic Information Nondiscrimination Act (RUTH)  RUTH is a federal law that protects individuals from health insurance or employment discrimination based on a genetic test result alone.  Although rare, there are currently no legal protections in terms of life insurance, long term care, or disability insurances.  Visit the National Human Genome Research [place] at Genome.gov/79844288 to learn more.    Reducing Cancer Risk  Each of the genes listed within this handout have nationally recognized cancer screening guidelines that would be recommended for individuals who test  positive.  In addition to increased cancer screening, surgeries may be offered or recommended to reduce cancer risk in certain cases.  Recommendations are based upon an individual s genetic test result as well as their personal and family history of cancer.    Questions to Think About Regarding Genetic Testing  ? What effect will the test result have on me and my relationship with my family members if I have an inherited gene mutation?  If I don t have a gene mutation?  ? Should I share my test results, and how will my family react to this news, which may also affect them?  ? Are my children ready to learn new information that may one day affect their own health?    Resources    PTEN World Battery Medicsworld.Clutch   No Stomach for Cancer, Inc. nostomachforcancer.org   Stomach Cancer Relief Network scrnet.org   Collaborative Group of the Americas on Inherited Colorectal Cancer (CGA) cgaicc.com   Cancer Care cancercare.org   American Cancer Society (ACS) cancer.org   National Cancer [place] (NCI) cancer.org   Carty Syndrome International lynchcancers.[email or web address]       Please call us if you have any questions or concerns.     Cancer Risk Management Program [phone]-CANCER ([phone])  ? Chin Adams, MS, [facility] [phone]  ? Angy Pack, MS, [facility]  [phone]  ? Alfreda Ratliff, MS, [facility]  [phone]  ? Sangeeta Marcum, MS, [facility] [phone]  ? Candie West, MS, [facility] [phone]  ? Tona Aggarwal, MS, [facility]  [phone]    References    1. Kinza KOHLER, Rhett J, Eduardo G, Balwinder E, Ruthann J, et al. The Prevalence of thyroid cancer and benign thyroid disease in patients with familial adenomatous polyposis may be higher than previously recognized. Clin Colorectal Cancer. 2012;11:304-308.  2. Bria L, Damian Merrill A, Daniel L, Daniel C, Kehinde K, et al. Risk of colorectal cancer in juvenile polyposis. Gut. 2007;56:965-967.  3. Cyrus FG, Aly MN, Diomedes CA. Colorectal cancer risk in hamartomatous polyposis syndromes.  World Journal of Gastrointestinal Surgery. 2015;27:25-32  4. Imtiaz HOWE. Guidance on gastrointestinal surveillance for hereditary non-polyposis colorectal cancer, familial adenomatous polypolis, juvenile polyposis, and Peutz-Jeghers syndrome. Gut. 2002;51:21-27.  5. Rudi AK, Wei OZ, Darin JG et al. Risk of extracolonic cancers for people with biallelic and monoallelic mutations in MUTYH. Int J of Cancer. 2016;139:6121-4422.  6. Srinath S, Kira S, Christian H, Scooby K, Aldana M, et al. MUTYH-associated polyposis: 70 of 71 patients with biallelic mutations present with an attenuated or atypical phenotype. Int J of Cancer. 2006;119:807-814.  7. Aristeo G, Reji F, Adelso I, Marco A M, Aristeo H, et al. MUTYH mutation carriers have increased breast cancer risk. Cancer. 2012;1899-2883.  8. Vasiliy MH, Mellissa J, Melania J, Debora LA, Hardy MS, Eng C. Lifetime cancer risks in individuals with germline PTEN mutations. Clin Cancer Res. 2012;18:400-7.  9. Pilaristeoki R. Cowden Syndrome: A Critical Review of the Clinical Literature. J Ruth . 2009:18:13-27.  10. National Comprehensive Cancer Network. Clinical practice guidelines in oncology, colorectal cancer screening. Available online (registration required). 2013.  11. National Cancer [place]. SEER Cancer Stat Fact Sheets.  December 2013.  12. CHEK2 Breast Cancer Case-Control Consortium. CHEK2*1100delC and susceptibility to breast cancer: A collaborative analysis involving 10,860 breast cancer cases and 9,065 controls from 10 studies. Am J Hum Ruth, 74 (2004), pp. 3440-1336  13. Lola T, Nahed S, Brendan K, et al. Spectrum of Mutations in BRCA1, BRCA2, CHEK2, and TP53 in Families at High Risk of Breast Cancer. LEROY. 2006;295(12):0487-6829.  14. Sanju C, Darwin D, Aislinn A, et al. Risk of breast cancer in women with a CHEK2 mutation with and without a family history of breast cancer. J Clin Oncol. 2011;29:8445-7645.  15. Min REAVES, Tyree E, Amari J, Dionte RYAN,  Sallie RAHMAN et al. Defining the polyposis/colorectal cancer phenotype associated with the GREM1 duplication: counseling and management guidelines. Ruth .Res. 2016;98:1-5.  16. Jacek KOHLER, Amarjit S, Jeremy KNIGHT, Tomasz Tamez, et al. Hereditary mixed polyposis syndrome is caused by a 40kb upstream duplication that leads to increased and ectopic expression of the BMP antagonist GREM1. Nelsy Ruth. 2015;44:699-703.  17. ANGELICA Chavira. et al. Germline mutations affecting the proofreading domains of POLE and POLD1 predispose to colorectal adenomas and carcinomas. Nelsy. Ruth. 45, 136-44 (2013).  18. JANN Donaldson. et al. POLE and POLD1 mutations in 529 jessica with familial colorectal cancer and/or polyposis: review of reported cases and recommendations for genetic testing and surveillance. Ruth. Med. (2015). doi:10.1038/gim.2015.75  19. KENDRA Rojas et al. New insights into POLE and POLD1 germline mutations in familial colorectal cancer and polyposis. Hum. Mol. Ruth. 23, 0860-12 (2014).  20. AGUSTINA Sam. et al. Frequency and phenotypic spectrum of germline mutations in POLE and seven other polymerase genes in 266 patients with colorectal adenomas and carcinomas. Int. J. Cancer 137, 320-31 (2015).

## 2021-06-23 NOTE — PROVIDER NOTIFICATION
Paged Channing Tovar MD, regarding low UOP. UOP was averaging 25ml/hr. Provider paged back to flush rascon and if it was patent to give 1000ml bolus. This RN flushed rascon with positive return and gave bolus.   Will continue to monitor.

## 2021-06-23 NOTE — CONSULTS
"NEW INPATIENT DIABETES MANAGEMENT CONSULT  Da Amaya  Age: 76 year old  MRN # [id number]   YOB: 1945    Chief Complaint: recurrent colon cancer.   Reason for Consult: \"postop diabetes management\"  Consulting Provider: Sae Hernandez MD    History of Present Illness:   Da is a 76 year old male with a history of HTN, CKD III, hx CAD s/p CABG, HLP, TIIDM, obesity, and history of colon cancer s/p lap right hemicolectomy with a new unresectable polyp who was admitted 21 for recurrent ileocolic bowel resection.     Regarding history of TIIDM; is maintained on BID Metformin, BID Glipizide, and daily Lantus PTA. A1c was poorly controlled, 8.8% pre op. He recently established care with [facility] to improve BG control prior to surgery, and was started on daily Lantus on  (less than a week ago). He reports that, since starting Lantus, his fasting BG have improved (was 99 on the morning prior to admission) and he has not seen any BG <200.     Following admission, fasting .    Was started on IV insulin intraoperatively, but was discontinued without the addition of basal insulin postoperatively. As a result, BG now >200.   Clear liquid diet is ordered, advancement will be pending return of bowel function, possibly 1-2 days.     Other Active Medical Problems: recurrent colon cancer.  Diabetes Mellitus Type: II  Duration: since   Diabetic Complications: CAD, CKD  Prior to Admission Diabetes Regimen:      BG monitorin-3 times daily  Metformin IR 1000 mg BID WM  Glipizide IR 10mg BID WM  Lantus 20 units daily HS  Usual BG control PTA:  Suboptimally controlled, A1c 8.8%  History of DKA: no  Able to Detect Hypoglycemia: yes. He has been having occasional hypoglycemia to 60's mid afternoon, eating less lately  Usual Diabetes Care Provider: Dr. Gregg, [facility] (previously, PCP Dr. Muller)  Primary Care Provider: Toro Worley  Factors Impacting IP Glucose " "Control: NPO/CLD diet, post op stress, omission of PTA regimen for diabetes control.   Current Diet: Orders Placed This Encounter      Clear Liquid Diet     10 point ROS completed with pertinent positives and negatives noted in the HPI  Past medical, family and social histories are reviewed and updated.    Social History    Tobacco: former, quit 1975    Alcohol: rare    Marital Status: single    Place of Residence: [place], [place]    Family History  Denies known FHx diabetes.     Physical Exam   /51 (BP Location: Left arm)   Pulse 77   Temp 97.3  F (36.3  C) (Oral)   Resp 16   Ht 1.727 m (5' 8\")   Wt 94.6 kg (208 lb 8.9 oz)   SpO2 93%   BMI 31.71 kg/m    General: pleasant, in no distress, resting in bed. Tired.   HEENT: normocephalic, atraumatic. Oral mucous membranes moist.   Lungs: unlabored respiration, no cough  ABD: rounded, soft.   Skin: warm and dry, no obvious lesions  MSK:  moves all extremities  Lymp:  no LE edema   Mental status:  alert, oriented to self, place, time  Psych:  bright affect, calm and appropriate interaction     Most Recent Laboratory Tests:  Recent Labs   Lab 06/23/21  0715   HGB 8.0*     Recent Labs   Lab 06/18/21  1540   A1C 8.8*     Recent Labs   Lab 06/23/21  0715   CR 1.34*     Recent Labs   Lab 06/23/21  0715 06/23/21  0215 06/22/21  2232 06/22/21  1903 06/22/21  1655 06/22/21  1500 06/22/21  1358 06/22/21  1038 06/22/21  0950 06/22/21  0608 06/18/21  1540   *  --   --   --   --   --  225* 209* 185*  --  136*   BGM  --  190* 194* 199* 192* 215*  --   --   --  161*  --        Assessment:   1) TIIDM, uncontrolled (A1c 8.8%)  2) Postoperative hyperglycemia     IV insulin discontinued, with omission of basal and prandial agents.   He has only been on Lantus insulin x1 week, which would not yet lead to improvement in A1c value. He reports BG have improved to <200 at home since starting insulin.      Plan:    -Lantus 23 units daily, STAT today   -add Novolog 1:10g CHO " coverage with all meals and snacks   -Novolog moderate intensity sliding scale AC/HS   -BG monitoring TID AC, HS, 0200   -will assess for resumption in Metformin/Glipizide once tolerating diet advancement, pending stability in renal function.    -hypoglycemia protocol   -ADAT per surgery with carb counting protocol   -diabetes education needs are not identified; he recently started Lantus insulin and is comfortable with injections.    -on discharge, will recommend outpatient follow up with endocrinology service within 2-3 weeks for reassessment.    Discussed plan of care with patient, nursing, and primary team   Thank you for this consult; Inpatient Diabetes will continue to follow.     To contact Endocrine Diabetes service:   From 8AM-4PM: page inpatient diabetes provider that is following the patient  For questions or updates from 4PM-8AM: page the diabetes job code for on call fellow: [phone]    80 minutes spent on the date of the encounter doing chart review, history and exam, documentation and further activities per the note      Over 50% of my time on the unit was spent counseling the patient and/or coordinating care regarding acute hyperglycemia management.  See note for details.    Isabel Mckeon PA-C  Inpatient Diabetes Management Service  Pager [phone]

## 2021-06-23 NOTE — PROGRESS NOTES
"   06/23/21 1029   Quick Adds   Type of Visit Initial PT Evaluation   Living Environment   People in home friend(s)  (1 friend)   Current Living Arrangements house   Home Accessibility stairs to enter home   Number of Stairs, Main Entrance 3  (reports there is a ramp to get in, often uses stairs)   Stair Railings, Main Entrance railings on both sides of stairs   Transportation Anticipated car, drives self   Living Environment Comments Pt reports living with a friend in a ranch style home, reports it is fully accessible including raised toilet seat in bathroom and ramps. States 3 stairs to enter that typically do not cause issue but has option to use wc ramp. Pt was performing lawn care duties but roommates family are able to provide assist as needed   Self-Care   Usual Activity Tolerance good   Current Activity Tolerance moderate   Regular Exercise No   Equipment Currently Used at Home none  (has walker/cane)   Activity/Exercise/Self-Care Comment Pt reports ind at baseline with all ADLs and mobility.    Disability/Function   Hearing Difficulty or Deaf no   Wear Glasses or Blind no  (reports hx of cataracts)   Concentrating, Remembering or Making Decisions Difficulty no   Difficulty Communicating no   Difficulty Eating/Swallowing no   Walking or Climbing Stairs Difficulty no   Dressing/Bathing Difficulty no   Toileting issues no   Doing Errands Independently Difficulty (such as shopping) no   Fall history within last six months no   Change in Functional Status Since Onset of Current Illness/Injury yes   General Information   Onset of Illness/Injury or Date of Surgery 06/22/21   Referring Physician Sae Hernandez MD   Patient/Family Therapy Goals Statement (PT) Return home    Pertinent History of Current Problem (include personal factors and/or comorbidities that impact the POC) Per medical chart \"76M PMH MI, s/p CABG, stage 3 renal disease, DM2, HTN, hx lap R hemicolectomy in 2019 for colon cancer (pT2N0; 0/23 " "LN+) and surveillance colonoscopy revealing recurrent unresectable polyp concerning for cancer recurrence (although path showed tubulovillous adenoma) now s/p transverse colectomy 6/22. \"   Existing Precautions/Restrictions abdominal;fall   Weight-Bearing Status - LUE partial weight-bearing (% in comments)  (<10 lbs)   Weight-Bearing Status - RUE partial weight-bearing (% in comments)  (<10 lbs)   General Observations Activity: ambulate with assist   Cognition   Orientation Status (Cognition) oriented x 4   Affect/Mental Status (Cognition) WNL   Follows Commands (Cognition) WNL   Pain Assessment   Patient Currently in Pain No   Integumentary/Edema   Integumentary/Edema no deficits were identifed   Posture    Posture Forward head position   Range of Motion (ROM)   ROM Comment B LE WNL   Strength   Strength Comments No formal MMT, demo antigravity strength supien in bed and adequate strength to stand without assist   Bed Mobility   Comment (Bed Mobility) Min A rolling L and R. min A supine > sit with log roll    Transfers   Transfer Safety Comments STS with min A x1, complaints of dizziness   Gait/Stairs (Locomotion)   Comment (Gait/Stairs) unable to initate due to dizziness and soft BP.    Balance   Balance Comments not assessed   Sensory Examination   Sensory Perception patient reports no sensory changes   Coordination   Coordination no deficits were identified   Muscle Tone   Muscle Tone no deficits were identified   Clinical Impression   Criteria for Skilled Therapeutic Intervention yes, treatment indicated   PT Diagnosis (PT) Impaired functional mobility    Influenced by the following impairments Pain, generalized weakness/deconditioning, abd precautions   Functional limitations due to impairments impaired standing tolerance, impaired bed mobility, transfers, and gait   Clinical Presentation Stable/Uncomplicated   Clinical Presentation Rationale clinical judgement   Clinical Decision Making (Complexity) low " complexity   Therapy Frequency (PT) 5x/week   Predicted Duration of Therapy Intervention (days/wks) 1 week   Planned Therapy Interventions (PT) balance training;bed mobility training;gait training;home exercise program;neuromuscular re-education;patient/family education;ROM (range of motion);strengthening;transfer training   Risk & Benefits of therapy have been explained evaluation/treatment results reviewed;care plan/treatment goals reviewed;risks/benefits reviewed;current/potential barriers reviewed;participants voiced agreement with care plan;participants included;patient   PT Discharge Planning    PT Discharge Recommendation (DC Rec) home with home care physical therapy   PT Rationale for DC Rec pt mobilizing below baseline function, primarily limited to dizziness and post op pain, anticipate pt will progress to safe discharge home although continue to monitor for TCU pending LOS and pt progress   PT Brief overview of current status  Min A bed mobility and transfers gait belt and IV support   Total Evaluation Time   Total Evaluation Time (Minutes) 8

## 2021-06-23 NOTE — PLAN OF CARE
Alert and oriented x 4. Vital sign stable. On room air. Reports little discomfort on the abdomen. Poor oral intake. On LR at 75 ml/hr. Haynes with marginal urina out put. Patient was encouraged oral fluid intake. Hypo bowel sound. Dangled and reports dizziness. Plan: continue care. Encourage and assist with ambulation.

## 2021-06-23 NOTE — PROGRESS NOTES
"Colorectal Surgery Progress Note    Subjective: No acute events. 1L LR bolus overnight for low UOP (not low if looking at average output since OR). Pain controlled. Has ambulated in room. Tolerated sips of clears, no real trial of diet yet. Denies nausea, vomiting. Not passing gas, no BM.    Objective:   /57 (BP Location: Left arm)   Pulse 67   Temp 96.8  F (36  C) (Temporal)   Resp 17   Ht 1.727 m (5' 8\")   Wt 94.6 kg (208 lb 8.9 oz)   SpO2 94%   BMI 31.71 kg/m      PE:  Gen: comfortable in bed  CV: Regular rate  Resp: non-labored breathing on 3L NC  Abd: Soft, appropriately tender, mildly distended, no rebound/guarding  Ext: warm and well perfused  Incision: c/d/i with Dermabond overlying      Intake/Output Summary (Last 24 hours) at 6/23/2021 0700  Last data filed at 6/23/2021 0659  Gross per 24 hour   Intake 3851.25 ml   Output 1170 ml   Net 2681.25 ml       Labs pending    A/P: 76M PMH MI, s/p CABG, stage 3 renal disease, DM2, HTN, hx lap R hemicolectomy in 2019 for colon cancer (pT2N0; 0/23 LN+) and surveillance colonoscopy revealing recurrent unresectable polyp concerning for cancer recurrence (although path showed tubulovillous adenoma) now s/p transverse colectomy 6/22.     - Follow up AM labs  - Continue rascon until this afternoon for low UOP overnight.  Trend creat.   - Follow up endocrine recs  - Clears this morning until taking more PO  - IV fluids  - hold lovenox ppx due to acute blood loss anemia, Hgb 8 from baseline 13.  Recheck Hgb later this afternoon.    - PT/OT  - dispo later this week to weekend.       Seen and discussed with fellow, who will discuss with staff.    Sae Hernandez MD  Surgery PGY-1    Colleen Dodson PA-C  Colon and Rectal Surgery     "

## 2021-06-23 NOTE — PROGRESS NOTES
Admitted/transferred from: PACU  2 RN full   skin assessment completed by Nelda Sams, RN and Amy BRIGHT RN.  Skin assessment finding: issues found surgical lap sites and incision   Interventions/actions: skin interventions wound care per surgical team     Will continue to monitor.

## 2021-06-23 NOTE — PLAN OF CARE
POD 1. AVSS on 2L. Pain managed with Oxycodone. Denies nausea, on a clear liquid diet. PIVx2, SL and MIVF. Lap sites/midline ALEC/CDI. Haynes averaging 23ml/hr for UOP, see provider note. Bolus just finishing up around 630 this AM. Denies passing gas, no BM. Art line dressing CDI, to be removed at 5pm tonight.   Continue POC

## 2021-06-23 NOTE — PLAN OF CARE
OT 7C: Hold; per discussion with PT, pt may have acute OT needs, however limited by dizziness/hypotension this date. PT to follow, OT will follow from periphery and initiate if/when appropriate. Appreciate referral.

## 2021-06-24 ENCOUNTER — APPOINTMENT (OUTPATIENT)
Dept: PHYSICAL THERAPY | Facility: CLINIC | Age: 76
DRG: 330 | End: 2021-06-24
Attending: SURGERY
Payer: MEDICARE

## 2021-06-24 ENCOUNTER — APPOINTMENT (OUTPATIENT)
Dept: GENERAL RADIOLOGY | Facility: CLINIC | Age: 76
DRG: 330 | End: 2021-06-24
Attending: STUDENT IN AN ORGANIZED HEALTH CARE EDUCATION/TRAINING PROGRAM
Payer: MEDICARE

## 2021-06-24 LAB
ANION GAP SERPL CALCULATED.3IONS-SCNC: 6 MMOL/L (ref 3–14)
ANION GAP SERPL CALCULATED.3IONS-SCNC: 9 MMOL/L (ref 3–14)
BUN SERPL-MCNC: 18 MG/DL (ref 7–30)
BUN SERPL-MCNC: 18 MG/DL (ref 7–30)
CALCIUM SERPL-MCNC: 7.8 MG/DL (ref 8.5–10.1)
CALCIUM SERPL-MCNC: 8.3 MG/DL (ref 8.5–10.1)
CHLORIDE SERPL-SCNC: 104 MMOL/L (ref 94–109)
CHLORIDE SERPL-SCNC: 99 MMOL/L (ref 94–109)
CO2 SERPL-SCNC: 24 MMOL/L (ref 20–32)
CO2 SERPL-SCNC: 25 MMOL/L (ref 20–32)
CREAT SERPL-MCNC: 1.19 MG/DL (ref 0.66–1.25)
CREAT SERPL-MCNC: 1.2 MG/DL (ref 0.66–1.25)
ERYTHROCYTE [DISTWIDTH] IN BLOOD BY AUTOMATED COUNT: 14.4 % (ref 10–15)
ERYTHROCYTE [DISTWIDTH] IN BLOOD BY AUTOMATED COUNT: 14.6 % (ref 10–15)
GFR SERPL CREATININE-BSD FRML MDRD: 59 ML/MIN/{1.73_M2}
GFR SERPL CREATININE-BSD FRML MDRD: 60 ML/MIN/{1.73_M2}
GLUCOSE BLDC GLUCOMTR-MCNC: 116 MG/DL (ref 70–99)
GLUCOSE BLDC GLUCOMTR-MCNC: 162 MG/DL (ref 70–99)
GLUCOSE BLDC GLUCOMTR-MCNC: 164 MG/DL (ref 70–99)
GLUCOSE BLDC GLUCOMTR-MCNC: 191 MG/DL (ref 70–99)
GLUCOSE SERPL-MCNC: 126 MG/DL (ref 70–99)
GLUCOSE SERPL-MCNC: 182 MG/DL (ref 70–99)
HCT VFR BLD AUTO: 22.5 % (ref 40–53)
HCT VFR BLD AUTO: 27.8 % (ref 40–53)
HGB BLD-MCNC: 7.2 G/DL (ref 13.3–17.7)
HGB BLD-MCNC: 9 G/DL (ref 13.3–17.7)
MAGNESIUM SERPL-MCNC: 2.3 MG/DL (ref 1.6–2.3)
MAGNESIUM SERPL-MCNC: 2.3 MG/DL (ref 1.6–2.3)
MCH RBC QN AUTO: 27.1 PG (ref 26.5–33)
MCH RBC QN AUTO: 27.1 PG (ref 26.5–33)
MCHC RBC AUTO-ENTMCNC: 32 G/DL (ref 31.5–36.5)
MCHC RBC AUTO-ENTMCNC: 32.4 G/DL (ref 31.5–36.5)
MCV RBC AUTO: 84 FL (ref 78–100)
MCV RBC AUTO: 85 FL (ref 78–100)
PHOSPHATE SERPL-MCNC: 1.8 MG/DL (ref 2.5–4.5)
PLATELET # BLD AUTO: 151 10E9/L (ref 150–450)
PLATELET # BLD AUTO: 253 10E9/L (ref 150–450)
POTASSIUM SERPL-SCNC: 3.5 MMOL/L (ref 3.4–5.3)
POTASSIUM SERPL-SCNC: 3.7 MMOL/L (ref 3.4–5.3)
POTASSIUM SERPL-SCNC: 3.7 MMOL/L (ref 3.4–5.3)
RBC # BLD AUTO: 2.66 10E12/L (ref 4.4–5.9)
RBC # BLD AUTO: 3.32 10E12/L (ref 4.4–5.9)
SODIUM SERPL-SCNC: 132 MMOL/L (ref 133–144)
SODIUM SERPL-SCNC: 136 MMOL/L (ref 133–144)
WBC # BLD AUTO: 17 10E9/L (ref 4–11)
WBC # BLD AUTO: 8.8 10E9/L (ref 4–11)

## 2021-06-24 PROCEDURE — 74018 RADEX ABDOMEN 1 VIEW: CPT | Mod: 26 | Performed by: RADIOLOGY

## 2021-06-24 PROCEDURE — 999N001017 HC STATISTIC GLUCOSE BY METER IP

## 2021-06-24 PROCEDURE — 250N000011 HC RX IP 250 OP 636: Performed by: PHYSICIAN ASSISTANT

## 2021-06-24 PROCEDURE — 84132 ASSAY OF SERUM POTASSIUM: CPT | Performed by: SURGERY

## 2021-06-24 PROCEDURE — 80048 BASIC METABOLIC PNL TOTAL CA: CPT | Performed by: STUDENT IN AN ORGANIZED HEALTH CARE EDUCATION/TRAINING PROGRAM

## 2021-06-24 PROCEDURE — 36415 COLL VENOUS BLD VENIPUNCTURE: CPT | Performed by: STUDENT IN AN ORGANIZED HEALTH CARE EDUCATION/TRAINING PROGRAM

## 2021-06-24 PROCEDURE — 83735 ASSAY OF MAGNESIUM: CPT | Performed by: STUDENT IN AN ORGANIZED HEALTH CARE EDUCATION/TRAINING PROGRAM

## 2021-06-24 PROCEDURE — 99233 SBSQ HOSP IP/OBS HIGH 50: CPT | Mod: 24 | Performed by: NURSE PRACTITIONER

## 2021-06-24 PROCEDURE — 250N000009 HC RX 250: Performed by: PHYSICIAN ASSISTANT

## 2021-06-24 PROCEDURE — 85027 COMPLETE CBC AUTOMATED: CPT | Performed by: STUDENT IN AN ORGANIZED HEALTH CARE EDUCATION/TRAINING PROGRAM

## 2021-06-24 PROCEDURE — 36415 COLL VENOUS BLD VENIPUNCTURE: CPT | Performed by: SURGERY

## 2021-06-24 PROCEDURE — 250N000011 HC RX IP 250 OP 636: Performed by: SURGERY

## 2021-06-24 PROCEDURE — 250N000013 HC RX MED GY IP 250 OP 250 PS 637: Performed by: SURGERY

## 2021-06-24 PROCEDURE — 74018 RADEX ABDOMEN 1 VIEW: CPT

## 2021-06-24 PROCEDURE — 258N000003 HC RX IP 258 OP 636: Performed by: STUDENT IN AN ORGANIZED HEALTH CARE EDUCATION/TRAINING PROGRAM

## 2021-06-24 PROCEDURE — 258N000003 HC RX IP 258 OP 636: Performed by: PHYSICIAN ASSISTANT

## 2021-06-24 PROCEDURE — 250N000011 HC RX IP 250 OP 636: Performed by: STUDENT IN AN ORGANIZED HEALTH CARE EDUCATION/TRAINING PROGRAM

## 2021-06-24 PROCEDURE — 84100 ASSAY OF PHOSPHORUS: CPT | Performed by: STUDENT IN AN ORGANIZED HEALTH CARE EDUCATION/TRAINING PROGRAM

## 2021-06-24 PROCEDURE — 97530 THERAPEUTIC ACTIVITIES: CPT | Mod: GP

## 2021-06-24 PROCEDURE — 250N000011 HC RX IP 250 OP 636

## 2021-06-24 PROCEDURE — 250N000013 HC RX MED GY IP 250 OP 250 PS 637: Performed by: STUDENT IN AN ORGANIZED HEALTH CARE EDUCATION/TRAINING PROGRAM

## 2021-06-24 PROCEDURE — 120N000002 HC R&B MED SURG/OB UMMC

## 2021-06-24 RX ORDER — HEPARIN SODIUM 5000 [USP'U]/.5ML
5000 INJECTION, SOLUTION INTRAVENOUS; SUBCUTANEOUS EVERY 12 HOURS
Status: DISCONTINUED | OUTPATIENT
Start: 2021-06-24 | End: 2021-06-29

## 2021-06-24 RX ORDER — SODIUM CHLORIDE AND POTASSIUM CHLORIDE 150; 450 MG/100ML; MG/100ML
INJECTION, SOLUTION INTRAVENOUS CONTINUOUS
Status: DISCONTINUED | OUTPATIENT
Start: 2021-06-24 | End: 2021-06-24

## 2021-06-24 RX ORDER — POTASSIUM CHLORIDE 7.45 MG/ML
10 INJECTION INTRAVENOUS ONCE
Status: COMPLETED | OUTPATIENT
Start: 2021-06-24 | End: 2021-06-24

## 2021-06-24 RX ORDER — DEXTROSE MONOHYDRATE, SODIUM CHLORIDE, AND POTASSIUM CHLORIDE 50; 1.49; 4.5 G/1000ML; G/1000ML; G/1000ML
INJECTION, SOLUTION INTRAVENOUS CONTINUOUS
Status: DISCONTINUED | OUTPATIENT
Start: 2021-06-24 | End: 2021-06-25

## 2021-06-24 RX ADMIN — PANTOPRAZOLE SODIUM 40 MG: 40 TABLET, DELAYED RELEASE ORAL at 08:56

## 2021-06-24 RX ADMIN — ACETAMINOPHEN 975 MG: 325 TABLET, FILM COATED ORAL at 10:39

## 2021-06-24 RX ADMIN — SODIUM PHOSPHATE, MONOBASIC, MONOHYDRATE 15 MMOL: 276; 142 INJECTION, SOLUTION INTRAVENOUS at 14:40

## 2021-06-24 RX ADMIN — GABAPENTIN 300 MG: 300 CAPSULE ORAL at 19:45

## 2021-06-24 RX ADMIN — POTASSIUM CHLORIDE AND SODIUM CHLORIDE: 450; 150 INJECTION, SOLUTION INTRAVENOUS at 05:07

## 2021-06-24 RX ADMIN — METOPROLOL SUCCINATE 100 MG: 100 TABLET, EXTENDED RELEASE ORAL at 08:56

## 2021-06-24 RX ADMIN — GABAPENTIN 300 MG: 300 CAPSULE ORAL at 14:21

## 2021-06-24 RX ADMIN — TAMSULOSIN HYDROCHLORIDE 0.4 MG: 0.4 CAPSULE ORAL at 08:56

## 2021-06-24 RX ADMIN — ATORVASTATIN CALCIUM 40 MG: 40 TABLET, FILM COATED ORAL at 08:56

## 2021-06-24 RX ADMIN — ACETAMINOPHEN 975 MG: 325 TABLET, FILM COATED ORAL at 16:56

## 2021-06-24 RX ADMIN — POTASSIUM CHLORIDE, DEXTROSE MONOHYDRATE AND SODIUM CHLORIDE: 150; 5; 450 INJECTION, SOLUTION INTRAVENOUS at 23:40

## 2021-06-24 RX ADMIN — ACETAMINOPHEN 975 MG: 325 TABLET, FILM COATED ORAL at 21:44

## 2021-06-24 RX ADMIN — ACETAMINOPHEN 975 MG: 325 TABLET, FILM COATED ORAL at 04:30

## 2021-06-24 RX ADMIN — HEPARIN SODIUM 5000 UNITS: 5000 INJECTION, SOLUTION INTRAVENOUS; SUBCUTANEOUS at 09:03

## 2021-06-24 RX ADMIN — HEPARIN SODIUM 5000 UNITS: 5000 INJECTION, SOLUTION INTRAVENOUS; SUBCUTANEOUS at 19:45

## 2021-06-24 RX ADMIN — INSULIN ASPART 1 UNITS: 100 INJECTION, SOLUTION INTRAVENOUS; SUBCUTANEOUS at 13:03

## 2021-06-24 RX ADMIN — INSULIN ASPART 2 UNITS: 100 INJECTION, SOLUTION INTRAVENOUS; SUBCUTANEOUS at 17:40

## 2021-06-24 RX ADMIN — POTASSIUM CHLORIDE 10 MEQ: 7.46 INJECTION, SOLUTION INTRAVENOUS at 10:55

## 2021-06-24 RX ADMIN — SODIUM PHOSPHATE, MONOBASIC, MONOHYDRATE 15 MMOL: 276; 142 INJECTION, SOLUTION INTRAVENOUS at 12:30

## 2021-06-24 RX ADMIN — OXYCODONE HYDROCHLORIDE 5 MG: 5 TABLET ORAL at 19:51

## 2021-06-24 RX ADMIN — GABAPENTIN 300 MG: 300 CAPSULE ORAL at 08:56

## 2021-06-24 RX ADMIN — HYDROMORPHONE HYDROCHLORIDE 0.4 MG: 0.2 INJECTION, SOLUTION INTRAMUSCULAR; INTRAVENOUS; SUBCUTANEOUS at 22:34

## 2021-06-24 ASSESSMENT — ACTIVITIES OF DAILY LIVING (ADL)
ADLS_ACUITY_SCORE: 18

## 2021-06-24 NOTE — PROGRESS NOTES
Neuro: A&O X4, pupils reactive, moving all extremities  Activity: walker, gait belt   Skin: bilateral abdominal incision sites and midline incision intact    CV: vitals stable  Resp: 2L NC  Access: PIVx2---- k +.45 NS @ 40  GI/: Distended, rounded abd, bowel sounds present, rascon  Pain: abdomen-- scheduled meds appropriate for control     Encouraged activity, Pt refused evening activity

## 2021-06-24 NOTE — PROGRESS NOTES
Assumed care of patient at 1530, pt was sitting up in chair. Denies pain at this time. No acute distress noted. Abdominal dressing clean dry and intact.Will continue to monitor. Care and observation continues

## 2021-06-24 NOTE — PROGRESS NOTES
Diabetes Consult Daily  Progress Note          Assessment/Plan:     HPI:  Da is a 76 year old male with a history of HTN, CKD III, hx CAD s/p CABG, HLP, TIIDM, obesity, and history of colon cancer s/p lap right hemicolectomy with a new unresectable polyp who was admitted 6/22/21 for recurrent ileocolic bowel resection.        Refusing Lantus dosing as ordered this AM - dose adjusted per preferences.  He is aware there is a good likelihood of hyperglycemia      Assessment:     1) TIIDM, uncontrolled (A1c 8.8%)  2) Postoperative hyperglycemia   3) Obesity; 31.71        Plan:                  -decrease Lantus 12 units daily (0900) per patient request/preference (will review with him and titrate as appropriate)                 -decrease Novolog 1:18g CHO coverage with all meals and snacks -  per patient request/preference                 -Novolog moderate intensity sliding scale AC/HS                 -BG monitoring TID AC, HS, 0200                 -will assess for resumption in Metformin/Glipizide once tolerating diet advancement, pending stability in renal function.                  -hypoglycemia protocol                 -ADAT per surgery with carb counting protocol                 -diabetes education needs are not identified; he recently started Lantus insulin and is comfortable with injections.                  -on discharge, will recommend outpatient follow up with endocrinology service within 2-3 weeks for reassessment.     Discussed plan of care with patient, nursing, and primary team via this note     Outpatient diabetes follow up: TBD             Interval History:     The last 24 hours progress and nursing notes reviewed.  No acute events this interval.  Trend stable following transition off IV insulin, no 0200 BG, awaiting AM BG to adjust Lantus if needed.      Da refused the Lantus 23 units this am for RN, we discussed patterns of BG while off the insulin drip and he initially did  "not want any lantus, with information Da ok with a significant reduction to 12 units.  He is aware this could likely result in hyperglycemia.  He is ok to change to q4h BG checks if BG does escalate.    Prefers cho coverage is also reduced.        He is comfortable in bed, finishing his breakfast of cream of wheat, milk without complaints.     Reviewed insulin trends and plan in detail.    Anticipating an elevated BG trend with his preferences, will monitor.  He is \"used\" to living with an elevated BG (supported by elevated A1c) and is nervous his BG will drop < 95 which has historically caused LE discomfort and he wants to be able to get up and walk so he can be closer to discharge ready.       Recent Labs   Lab 21  2312 21  1902 21  1104 21  0715 21  0215 21  2232 21  1903 21  1358 21  1358 21  1038 21  0950 21  1540 21  1540   GLC  --   --   --  209*  --   --   --   --  225* 209* 185*  --  136*   * 158* 163*  --  190* 194* 199*   < >  --   --   --    < >  --     < > = values in this interval not displayed.           Nutrition:     Orders Placed This Encounter      Clear Liquid Diet      Diet          PTA Regimen:     BG monitorin-3 times daily  Metformin IR 1000 mg BID WM  Glipizide IR 10mg BID WM  Lantus 20 units daily HS          Review of Systems:   CC:  \"Feeling just blah\" - no specific pain/complaints    Constitutional:   No fever/chills  ENT/Mouth:   No hearing changes, no ear pain, no sore throat, no rhinorrhea, no difficulty swallowing  Eyes:  No eye pain, no discharge, no vision changes  CV:   No CP/SOB, no new edema  Resp:  No cough, no wheezing  GI:   No N/V, no constipation, denies diarrhea  :  No dysuria, frequency, or hematuria  Musk:  No new joint swelling/pain, no back pain  Skin/heme:   No new rashes/bruises/open areas.  No pruritis  Neuro:   No new weakness, no numbness/tingling, no headache  Psych:   No " "new anxiety, denies depression  Endocrine:  No polyuria or polydipsia         Medications:   Steroid planning:  None       Physical Exam:   /61   Pulse 90   Temp 97.4  F (36.3  C) (Oral)   Resp 16   Ht 1.727 m (5' 8\")   Wt 94.6 kg (208 lb 8.9 oz)   SpO2 97%   BMI 31.71 kg/m      General:   NAD, pleasant,  resting comfortably in bed. Finiishing breakfast, friend at bedside.   HEENT:  NC/AT. MMM, sclera not injected  Lungs:  unremarkable, no new cough, no SOB  ABD:   soft,  non-tender,  no lipodystrophy noted  Skin:  warm and dry, no obvious lesions/rash  Feet:    CMS intact, PPP  MSK:   moving all extremities  Lymp:   no LE edema  Mental Status:  Alert and oriented x3  Psych:   Cooperative, good eye contact, full affect          Data:     Lab Results   Component Value Date    A1C 8.8 06/18/2021    A1C 8.9 05/05/2021        CBC RESULTS:   Recent Labs   Lab Test 06/24/21  0701   WBC 8.8   RBC 2.66*   HGB 7.2*   HCT 22.5*   MCV 85   MCH 27.1   MCHC 32.0   RDW 14.4        Recent Labs   Lab Test 06/23/21  0715 06/22/21  1925 06/22/21  1358 06/18/21  1540 06/18/21  1540     --  133   < > 140   POTASSIUM 3.8  --  3.5   < > 3.6   CHLORIDE 100  --   --   --  104   CO2 25  --   --   --  29   ANIONGAP 8  --   --   --  8   *  --  225*   < > 136*   BUN 26  --   --   --  22   CR 1.34* 1.22  --   --  1.44*   BOOGIE 7.3*  --   --   --  8.6    < > = values in this interval not displayed.     Liver Function Studies -   Recent Labs   Lab Test 06/18/21  1540   PROTTOTAL 7.4   ALBUMIN 3.7   BILITOTAL 0.6   ALKPHOS 92   AST 20   ALT 43     No results found for: INR      ARON Kauffman CNP   Inpatient Diabetes Management Service  Pager - [phone]  Friday - Monday 0800 - 1600 hrs  After hours above - Diabetes Management Team job code: [phone]    I spent a total of 35 minutes bedside and on the inpatient unit managing glycemic care.  Over 50% of my time on the unit was spent counseling the patient and/or " coordinating care regarding acute hyperglycemic management.  See note for details.

## 2021-06-24 NOTE — PLAN OF CARE
Neuro: Alert and oriented x4  Activity: SBA  Respiratory:  On 1L NC, encouraging IS, able to reach 1250. Breath sounds diminished.   Cardiac: WNL  Diet:  Low fiber, on BS checks QID and carb counts. Last was 164, 1 unit Novolog given.   Incisions: Abdominal incision with derma bond   IV access: 2 PIVs.  Right hand infusing 1/2NS with KCl @ 40. LFA PIV infusing sodium phosphate.   Pain: Scheduled Tylenol and gabapentin q6   GI: Denies nausea.  Passing gas.   : Haynes was removed @ 0915. Voiding spontaneously, adequate UOP. Post void bladder scan was 46.   Protocols:  Phosphorus, was 1.8. Getting 2nd infusion replacement now.  Recheck phosphorus 4 hours after infusion.

## 2021-06-24 NOTE — PLAN OF CARE
MD notified @ 0534 about pt's large watery bloody BM.  Vital signs stable. Pt asymptomatic.  MD will come and assess pt.

## 2021-06-24 NOTE — PROGRESS NOTES
"Colorectal Surgery Progress Note    Subjective: doing ok.  Got up around twice yesterday.  No gas/stool.  Denies nausea and not much burping.     Objective:   /54 (BP Location: Left arm)   Pulse 90   Temp 97.4  F (36.3  C) (Oral)   Resp 16   Ht 1.727 m (5' 8\")   Wt 94.6 kg (208 lb 8.9 oz)   SpO2 97%   BMI 31.71 kg/m      PE:  Gen: comfortable in bed  CV: Regular rate  Resp: non-labored breathing  Abd: Soft, appropriately tender, moderately distended, no rebound/guarding  Ext: warm and well perfused  Incision: c/d/i with Dermabond overlying      Intake/Output Summary (Last 24 hours) at 6/23/2021 0700  Last data filed at 6/23/2021 0659  Gross per 24 hour   Intake 3851.25 ml   Output 1170 ml   Net 2681.25 ml       A/P: 76M PMH MI, s/p CABG, stage 3 renal disease, DM2, HTN, hx lap R hemicolectomy in 2019 for colon cancer (pT2N0; 0/23 LN+) and surveillance colonoscopy revealing recurrent unresectable polyp concerning for cancer recurrence (although path showed tubulovillous adenoma) now s/p transverse colectomy 6/22.     - tylenol, gabapentin, oxy.  No NSAIDs 2/2 renal insufficiency.   - hold home amlodipine, hydrochlorothiazide, spironolactone, cozaar.  Continue home metop.   - adv to LRD.  Counseled to go slow.   - decrease IVF to 40.  KCl 10mEq once today and phos IV today.   - greatly apprec Endo management  - discontinue rascon today.  Continue home flomax.     - PT/OT.  Ambulate as much as possible.   - start Heparin BID today for ppx.  Continue to trend Hgb.    - dispo this weekend to early next week pending return of bowel function.  Will need daily lovenox on discharg.e     Seen and discussed with fellow, who will discuss with staff.    Colleen Dodson PA-C  Colon and Rectal Surgery     "

## 2021-06-25 ENCOUNTER — APPOINTMENT (OUTPATIENT)
Dept: PHYSICAL THERAPY | Facility: CLINIC | Age: 76
DRG: 330 | End: 2021-06-25
Attending: SURGERY
Payer: MEDICARE

## 2021-06-25 ENCOUNTER — APPOINTMENT (OUTPATIENT)
Dept: GENERAL RADIOLOGY | Facility: CLINIC | Age: 76
DRG: 330 | End: 2021-06-25
Attending: SURGERY
Payer: MEDICARE

## 2021-06-25 LAB
ANION GAP SERPL CALCULATED.3IONS-SCNC: 8 MMOL/L (ref 3–14)
BUN SERPL-MCNC: 18 MG/DL (ref 7–30)
CALCIUM SERPL-MCNC: 8.1 MG/DL (ref 8.5–10.1)
CHLORIDE SERPL-SCNC: 97 MMOL/L (ref 94–109)
CO2 SERPL-SCNC: 25 MMOL/L (ref 20–32)
COPATH REPORT: NORMAL
CREAT SERPL-MCNC: 1.08 MG/DL (ref 0.66–1.25)
ERYTHROCYTE [DISTWIDTH] IN BLOOD BY AUTOMATED COUNT: 14.7 % (ref 10–15)
GFR SERPL CREATININE-BSD FRML MDRD: 67 ML/MIN/{1.73_M2}
GLUCOSE BLDC GLUCOMTR-MCNC: 153 MG/DL (ref 70–99)
GLUCOSE BLDC GLUCOMTR-MCNC: 184 MG/DL (ref 70–99)
GLUCOSE BLDC GLUCOMTR-MCNC: 215 MG/DL (ref 70–99)
GLUCOSE BLDC GLUCOMTR-MCNC: 231 MG/DL (ref 70–99)
GLUCOSE SERPL-MCNC: 252 MG/DL (ref 70–99)
HCT VFR BLD AUTO: 27.3 % (ref 40–53)
HGB BLD-MCNC: 8.7 G/DL (ref 13.3–17.7)
MAGNESIUM SERPL-MCNC: 2.3 MG/DL (ref 1.6–2.3)
MCH RBC QN AUTO: 26.7 PG (ref 26.5–33)
MCHC RBC AUTO-ENTMCNC: 31.9 G/DL (ref 31.5–36.5)
MCV RBC AUTO: 84 FL (ref 78–100)
PLATELET # BLD AUTO: 249 10E9/L (ref 150–450)
POTASSIUM SERPL-SCNC: 3.8 MMOL/L (ref 3.4–5.3)
RBC # BLD AUTO: 3.26 10E12/L (ref 4.4–5.9)
SODIUM SERPL-SCNC: 130 MMOL/L (ref 133–144)
TROPONIN I SERPL-MCNC: <0.015 UG/L (ref 0–0.04)
WBC # BLD AUTO: 14.1 10E9/L (ref 4–11)

## 2021-06-25 PROCEDURE — 84484 ASSAY OF TROPONIN QUANT: CPT | Performed by: SURGERY

## 2021-06-25 PROCEDURE — 250N000013 HC RX MED GY IP 250 OP 250 PS 637: Performed by: STUDENT IN AN ORGANIZED HEALTH CARE EDUCATION/TRAINING PROGRAM

## 2021-06-25 PROCEDURE — 85027 COMPLETE CBC AUTOMATED: CPT | Performed by: SURGERY

## 2021-06-25 PROCEDURE — 250N000013 HC RX MED GY IP 250 OP 250 PS 637: Performed by: SURGERY

## 2021-06-25 PROCEDURE — 250N000011 HC RX IP 250 OP 636

## 2021-06-25 PROCEDURE — C9113 INJ PANTOPRAZOLE SODIUM, VIA: HCPCS | Performed by: STUDENT IN AN ORGANIZED HEALTH CARE EDUCATION/TRAINING PROGRAM

## 2021-06-25 PROCEDURE — 80048 BASIC METABOLIC PNL TOTAL CA: CPT | Performed by: SURGERY

## 2021-06-25 PROCEDURE — 36415 COLL VENOUS BLD VENIPUNCTURE: CPT | Performed by: SURGERY

## 2021-06-25 PROCEDURE — 99233 SBSQ HOSP IP/OBS HIGH 50: CPT | Mod: 24 | Performed by: PHYSICIAN ASSISTANT

## 2021-06-25 PROCEDURE — 97530 THERAPEUTIC ACTIVITIES: CPT | Mod: GP

## 2021-06-25 PROCEDURE — 258N000003 HC RX IP 258 OP 636: Performed by: SURGERY

## 2021-06-25 PROCEDURE — 250N000009 HC RX 250: Performed by: STUDENT IN AN ORGANIZED HEALTH CARE EDUCATION/TRAINING PROGRAM

## 2021-06-25 PROCEDURE — 74018 RADEX ABDOMEN 1 VIEW: CPT | Mod: 26 | Performed by: RADIOLOGY

## 2021-06-25 PROCEDURE — 250N000011 HC RX IP 250 OP 636: Performed by: STUDENT IN AN ORGANIZED HEALTH CARE EDUCATION/TRAINING PROGRAM

## 2021-06-25 PROCEDURE — 999N000065 XR ABDOMEN PORT 1 VIEWS

## 2021-06-25 PROCEDURE — 83735 ASSAY OF MAGNESIUM: CPT | Performed by: SURGERY

## 2021-06-25 PROCEDURE — 120N000002 HC R&B MED SURG/OB UMMC

## 2021-06-25 PROCEDURE — 999N001017 HC STATISTIC GLUCOSE BY METER IP

## 2021-06-25 RX ORDER — SODIUM CHLORIDE 9 MG/ML
INJECTION, SOLUTION INTRAVENOUS CONTINUOUS
Status: DISCONTINUED | OUTPATIENT
Start: 2021-06-25 | End: 2021-07-03

## 2021-06-25 RX ORDER — LABETALOL HYDROCHLORIDE 5 MG/ML
10-20 INJECTION, SOLUTION INTRAVENOUS EVERY 4 HOURS PRN
Status: DISCONTINUED | OUTPATIENT
Start: 2021-06-25 | End: 2021-06-29

## 2021-06-25 RX ORDER — METOPROLOL TARTRATE 1 MG/ML
10 INJECTION, SOLUTION INTRAVENOUS EVERY 6 HOURS
Status: DISCONTINUED | OUTPATIENT
Start: 2021-06-25 | End: 2021-06-26

## 2021-06-25 RX ADMIN — ACETAMINOPHEN 975 MG: 325 TABLET, FILM COATED ORAL at 09:30

## 2021-06-25 RX ADMIN — METOPROLOL TARTRATE 10 MG: 5 INJECTION INTRAVENOUS at 10:27

## 2021-06-25 RX ADMIN — PANTOPRAZOLE SODIUM 40 MG: 40 INJECTION, POWDER, FOR SOLUTION INTRAVENOUS at 10:28

## 2021-06-25 RX ADMIN — ACETAMINOPHEN 975 MG: 325 TABLET, FILM COATED ORAL at 16:05

## 2021-06-25 RX ADMIN — SODIUM CHLORIDE: 9 INJECTION, SOLUTION INTRAVENOUS at 19:28

## 2021-06-25 RX ADMIN — METOPROLOL TARTRATE 10 MG: 5 INJECTION INTRAVENOUS at 16:06

## 2021-06-25 RX ADMIN — HEPARIN SODIUM 5000 UNITS: 5000 INJECTION, SOLUTION INTRAVENOUS; SUBCUTANEOUS at 20:11

## 2021-06-25 RX ADMIN — HEPARIN SODIUM 5000 UNITS: 5000 INJECTION, SOLUTION INTRAVENOUS; SUBCUTANEOUS at 09:31

## 2021-06-25 RX ADMIN — SODIUM CHLORIDE: 9 INJECTION, SOLUTION INTRAVENOUS at 06:40

## 2021-06-25 RX ADMIN — OXYCODONE HYDROCHLORIDE 5 MG: 5 TABLET ORAL at 03:19

## 2021-06-25 RX ADMIN — METOPROLOL TARTRATE 10 MG: 5 INJECTION INTRAVENOUS at 21:51

## 2021-06-25 RX ADMIN — ACETAMINOPHEN 975 MG: 325 TABLET, FILM COATED ORAL at 21:51

## 2021-06-25 RX ADMIN — HYDROMORPHONE HYDROCHLORIDE 0.4 MG: 0.2 INJECTION, SOLUTION INTRAMUSCULAR; INTRAVENOUS; SUBCUTANEOUS at 05:17

## 2021-06-25 ASSESSMENT — ACTIVITIES OF DAILY LIVING (ADL)
ADLS_ACUITY_SCORE: 18

## 2021-06-25 NOTE — PROGRESS NOTES
"Colorectal Surgery Progress Note    Subjective: Worsening abdominal pain, bloating, distention overnight. AXR showed dilated bowel loops consistent with ileus. Made NPO. NGT placed this morning with 450cc out initially. Pt reported significant relief after NG placement. Good urine output. No more gas or stool apart from one bloody/clotted BM yesterday afternoon.     Objective:   BP (!) 149/77 (BP Location: Left arm)   Pulse 87   Temp 96.5  F (35.8  C) (Temporal)   Resp 18   Ht 1.727 m (5' 8\")   Wt 94.6 kg (208 lb 8.9 oz)   SpO2 96%   BMI 31.71 kg/m      PE:  Gen: sitting in chair, uncomfortable  HEENT: NGT in place; brown output in cannister  CV: Regular rate  Resp: non-labored breathing on RA  Abd: distended, tender throughout, no rebound/guarding  Ext: warm and well perfused  Incision: c/d/i      Intake/Output Summary (Last 24 hours) at 6/25/2021 0805  Last data filed at 6/25/2021 0734  Gross per 24 hour   Intake --   Output 600 ml   Net -600 ml       Labs reviewed  WBC 14.1 (17)  Hgb 8.7 (9.0) -- stable last few days  Na 130 (132) - has D5 1/2 NS KCl 125/hr  Troponin negative - third consecutive day therefore no more daily checking per anesthesia preop note    Imaging reviewed  Portable AXR 6/25:   IMPRESSION: Enteric tube tip and sidehole project over the gastric  fundus. Persistent but improved small bowel dilatation.    A/P: 76M hx MI, s/p CABG, stage 3 renal disease, DM2, HTN, hx lap R hemicolectomy in 2019 for colon cancer (pT2N0; 0/23 LN+) and surveillance colonoscopy revealing recurrent unresectable polyp concerning for cancer recurrence (although path showed tubulovillous adenoma) now s/p transverse colectomy 6/22. Distention, worsening pain, nausea overnight with AXR c/f ileus therefore made NPO, IVF, and NGT placed. Will monitor and hopefully remove NG tube in the next day or two once ileus resolves.     - NPO  - NS 75/hr  - NGT to LIS  - Home oral pain meds due to NGT, IV dilaudid prn " available  - Hold home amlodipine, HCTZ, spironolactone, cozaar.  - Change to metoprolol q4h and labetalol 10-20mg q4h prn.   - Appreciate endocrine diabetes management  - PT/OT  - Ppx: heparin BID (renal function)  - Dispo: home early next week pending ileus resolution and ROBF. 30 days Lovenox at discharge      Seen and discussed with fellow who will discuss with staff.    Sae Hernandez MD  Surgery PGY-1

## 2021-06-25 NOTE — PROGRESS NOTES
Assumed cares of pt @ 1900. Pt is AxO. VSS on RA. Pain managed with Tylenol, Oxy and Dilaudid x2. Pt abdomen is extended and firm. No BM overnight, adequate UOP. Denies nausea. Pt made NPO and IVF increased to 125/hr. Pt able to burp when sitting at side of the bed, giving minimal relief. Moved to the chair this am for comfort. Continue POC.     0615 - NGT placed to continuous suction per MD, pt tolerated well.

## 2021-06-25 NOTE — PROGRESS NOTES
IP Diabetes Management  Daily Note           Assessment and Plan:   HPI:Da is a 76 year old male with a history of HTN, CKD III, hx CAD s/p CABG, HLP, TIIDM, obesity, and history of colon cancer s/p lap right hemicolectomy with a new unresectable polyp who was admitted 6/22/21 for recurrent ileocolic bowel resection.     Assessment:   1)Type II Diabetes Mellitus, suboptimally controlled (A1c 8.8%), with post operative hyperglycemia      NGT decompression for ileus/obstruction progressing overnight, with attempt to advance diet yesterday.     Plan:    -increase Lantus 12>16 units daily AM (conservative increase, as pt has been hesitant to receive Lantus)   -aspart 1:18g CHO coverage with meals and snacks/supplements- won't receive as he is currently NPO   -aspart moderate intensity sliding scale, change to q4h, now NPO   -BG monitoring changed to q4h, now NPO   -hypoglycemia protocol   -carb counting protocol                 -will assess for resumption in Metformin/Glipizide once tolerating diet advancement, pending stability in renal function.                  -diabetes education needs are not identified; he recently started Lantus insulin and is comfortable with injections.                  -on discharge, will recommend outpatient follow up with endocrinology service within 2-3 weeks for reassessment.    Plan discussed with patient, bedside RN, and primary team via this note.        Interval History and Assessment: interval glucose trend reviewed:   BG higher end of target on current regimen (dictated by patient yesterday for fear of hypoglycemia), now to 250's this morning.     Large bloody BM yesterday evening, and increased abd distension and bloating. Imaging consistent with ileus. Had NGT placed for decompression this AM.     Notes indicate that D5 fluids are running; none are currently ordered/charted- sounds like they were running overnight from 2300-~0600, and now changed to NSS.     receiving IV phos  "replacement in D5.     Current nutritional intake and type: Orders Placed This Encounter      Diet      NPO per Anesthesia Guidelines for Procedure/Surgery Except for: Meds    PTA Diabetes Regimen:   BG monitorin-3 times daily  Metformin IR 1000 mg BID WM  Glipizide IR 10mg BID WM  Lantus 20 units daily HS    Discharge Planning: TBD           Diabetes History:   Type of Diabetes: Type 2 Diabetes Mellitus, stress hyperglycemia  Lab Results   Component Value Date    A1C 8.8 2021    A1C 8.9 2021              Review of Systems:     The Review of Systems is negative other than noted in the Interval History.           Medications:     Current Facility-Administered Medications   Medication     acetaminophen (TYLENOL) tablet 975 mg     [Held by provider] atorvastatin (LIPITOR) tablet 40 mg     bupivacaine liposome (EXPAREL) LONG ACTING injection was administered into the infiltration site to produce postsurgical analgesia. Duration of action is up to 72 hours, and other \"julia\" medications should not be given for 96 hours with the exception of the lidocaine 5% patch (LIDODERM) and the lidocaine 10mg in potassium infusions. This entry is for INFORMATION ONLY.     glucose gel 15-30 g    Or     dextrose 50 % injection 25-50 mL    Or     glucagon injection 1 mg     [Held by provider] gabapentin (NEURONTIN) capsule 300 mg     heparin ANTICOAGULANT injection 5,000 Units     HYDROmorphone (DILAUDID) injection 0.2 mg    Or     HYDROmorphone (DILAUDID) injection 0.4 mg     insulin aspart (NovoLOG) injection (RAPID ACTING)     insulin aspart (NovoLOG) injection (RAPID ACTING)     insulin aspart (NovoLOG) injection (RAPID ACTING)     insulin glargine (LANTUS PEN) injection 16 Units     labetalol (NORMODYNE/TRANDATE) injection 10-20 mg     lidocaine (LMX4) cream     lidocaine 1 % 0.1-1 mL     melatonin tablet 5 mg     metoprolol (LOPRESSOR) injection 10 mg     naloxone (NARCAN) injection 0.2 mg    Or     naloxone " "(NARCAN) injection 0.4 mg    Or     naloxone (NARCAN) injection 0.2 mg    Or     naloxone (NARCAN) injection 0.4 mg     ondansetron (ZOFRAN-ODT) ODT tab 4 mg    Or     ondansetron (ZOFRAN) injection 4 mg     [Held by provider] oxyCODONE (ROXICODONE) tablet 5 mg     pantoprazole (PROTONIX) IV push injection 40 mg     sodium chloride (PF) 0.9% PF flush 3 mL     sodium chloride (PF) 0.9% PF flush 3 mL     sodium chloride 0.9% infusion     [Held by provider] tamsulosin (FLOMAX) capsule 0.4 mg            Physical Exam:    BP (!) 169/79 (BP Location: Left arm)   Pulse 96   Temp 98.5  F (36.9  C) (Oral)   Resp 18   Ht 1.727 m (5' 8\")   Wt 94.6 kg (208 lb 8.9 oz)   SpO2 94%   BMI 31.71 kg/m    General: pleasant, in no distress, sitting up in chair, appears fatigued.  HEENT: normocephalic, atraumatic. Oral mucous membranes moist. Interval NGT placement with greenish output  Lungs: unlabored respiration, no cough  ABD: rounded, soft, no lipodystrophy noted  Skin: warm and dry, no obvious lesions  MSK:  moves all extremities  Lymp:  no LE edema   Mental status:  alert, oriented to self, place, time  Psych: calm and appropriate interaction             Data:     Recent Labs   Lab 06/25/21  1221 06/25/21  0920 06/25/21  0642 06/24/21  2242 06/24/21  2143 06/24/21  1738 06/24/21  1242 06/24/21  0901 06/24/21  0701 06/23/21  0715 06/23/21  0715 06/22/21  1358 06/22/21  1358 06/22/21  1038   GLC  --   --  252* 182*  --   --   --   --  126*  --  209*  --  225* 209*   * 231*  --   --  162* 191* 164* 116*  --    < >  --    < >  --   --     < > = values in this interval not displayed.     Lab Results   Component Value Date    WBC 14.1 (H) 06/25/2021    WBC 17.0 (H) 06/24/2021    WBC 8.8 06/24/2021    HGB 8.7 (L) 06/25/2021    HGB 9.0 (L) 06/24/2021    HGB 7.2 (L) 06/24/2021    HCT 27.3 (L) 06/25/2021    HCT 27.8 (L) 06/24/2021    HCT 22.5 (L) 06/24/2021    MCV 84 06/25/2021    MCV 84 06/24/2021    MCV 85 06/24/2021    PLT " 249 06/25/2021     06/24/2021     06/24/2021     Lab Results   Component Value Date     (L) 06/25/2021     (L) 06/24/2021     06/24/2021    POTASSIUM 3.8 06/25/2021    POTASSIUM 3.7 06/24/2021    POTASSIUM 3.7 06/24/2021    CHLORIDE 97 06/25/2021    CHLORIDE 99 06/24/2021    CHLORIDE 104 06/24/2021    CO2 25 06/25/2021    CO2 24 06/24/2021    CO2 25 06/24/2021     (H) 06/25/2021     (H) 06/24/2021     (H) 06/24/2021     Lab Results   Component Value Date    BUN 18 06/24/2021    BUN 18 06/24/2021    BUN 26 06/23/2021     No results found for: TSH  Lab Results   Component Value Date    AST 20 06/18/2021    AST 18 06/02/2021    AST 26 04/26/2021    ALT 43 06/18/2021    ALT 43 06/02/2021    ALT 53 04/26/2021    ALKPHOS 92 06/18/2021    ALKPHOS 105 06/02/2021       30 minutes spent on the date of the encounter doing chart review, history and exam, documentation and further activities per the note      Over 50% of my time on the unit was spent counseling the patient and/or coordinating care regarding acute hyperglycemia management.  See note for details.    To contact Endocrine Diabetes service:   From 8AM-4PM: page inpatient diabetes provider that is following the patient  For questions or updates from 4PM-8AM: page the diabetes job code for on call fellow: [phone]    Isabel Mckeon PA-C  Inpatient Diabetes Management Service  Pager [phone]

## 2021-06-25 NOTE — PROGRESS NOTES
"06/24/2021  Surgery Cross Cover Note    Da reports his bloating and abdominal pain has gotten worse over the course of this evening. Started LRD today and had food this morning, has felt full since then and just water. Had a bloody BM earlier today, mixed bright red and dark clots. No BM or gas since that BM this morning. No nausea/vomiting.     On examination:   BP (!) 169/79 (BP Location: Left arm)   Pulse 96   Temp 98.5  F (36.9  C) (Oral)   Resp 18   Ht 1.727 m (5' 8\")   Wt 94.6 kg (208 lb 8.9 oz)   SpO2 92%   BMI 31.71 kg/m    Appears uncomfortable, but no acute distress  RRR  Abdomen distended, taught, prominent LUQ bulge, tender throughout, no rebound tenderness or guarding. Incisions c/d/i    Repeat labs and xray tonight.     Please call with questions.    Channing Tovar MD  Surgery resident  [phone]    Addendum  Hgb stable 9.0, new leukocytosis 17 (8.8)  Na 132, otherwise normal electrolytes, Cr 1.19  Abdominal xray with dilated gas filled small bowel    - NPO  - start mIVF, D5 1/2NS        "

## 2021-06-25 NOTE — PLAN OF CARE
Pt is POD#3     A&Ox4, AVSS on RA.  Diet: NPO  Voiding: adequate UOP  BM: none this shift, last was small & bloody on 6/24  LDA: PIVx2, NG on LIS   Pain: denies, controlled well overnight     Continue with POC.

## 2021-06-26 ENCOUNTER — APPOINTMENT (OUTPATIENT)
Dept: PHYSICAL THERAPY | Facility: CLINIC | Age: 76
DRG: 330 | End: 2021-06-26
Attending: SURGERY
Payer: MEDICARE

## 2021-06-26 ENCOUNTER — APPOINTMENT (OUTPATIENT)
Dept: GENERAL RADIOLOGY | Facility: CLINIC | Age: 76
DRG: 330 | End: 2021-06-26
Attending: SURGERY
Payer: MEDICARE

## 2021-06-26 LAB
ANION GAP SERPL CALCULATED.3IONS-SCNC: 10 MMOL/L (ref 3–14)
BUN SERPL-MCNC: 24 MG/DL (ref 7–30)
CALCIUM SERPL-MCNC: 8.2 MG/DL (ref 8.5–10.1)
CHLORIDE SERPL-SCNC: 102 MMOL/L (ref 94–109)
CO2 SERPL-SCNC: 23 MMOL/L (ref 20–32)
CREAT SERPL-MCNC: 1.1 MG/DL (ref 0.66–1.25)
ERYTHROCYTE [DISTWIDTH] IN BLOOD BY AUTOMATED COUNT: 15.1 % (ref 10–15)
GFR SERPL CREATININE-BSD FRML MDRD: 66 ML/MIN/{1.73_M2}
GLUCOSE BLDC GLUCOMTR-MCNC: 152 MG/DL (ref 70–99)
GLUCOSE BLDC GLUCOMTR-MCNC: 164 MG/DL (ref 70–99)
GLUCOSE BLDC GLUCOMTR-MCNC: 178 MG/DL (ref 70–99)
GLUCOSE BLDC GLUCOMTR-MCNC: 184 MG/DL (ref 70–99)
GLUCOSE BLDC GLUCOMTR-MCNC: 186 MG/DL (ref 70–99)
GLUCOSE BLDC GLUCOMTR-MCNC: 201 MG/DL (ref 70–99)
GLUCOSE SERPL-MCNC: 170 MG/DL (ref 70–99)
HCT VFR BLD AUTO: 26.8 % (ref 40–53)
HGB BLD-MCNC: 8.5 G/DL (ref 13.3–17.7)
LACTATE BLD-SCNC: 1 MMOL/L (ref 0.7–2)
MAGNESIUM SERPL-MCNC: 2.3 MG/DL (ref 1.6–2.3)
MCH RBC QN AUTO: 27.1 PG (ref 26.5–33)
MCHC RBC AUTO-ENTMCNC: 31.7 G/DL (ref 31.5–36.5)
MCV RBC AUTO: 85 FL (ref 78–100)
PLATELET # BLD AUTO: 311 10E9/L (ref 150–450)
POTASSIUM SERPL-SCNC: 3.8 MMOL/L (ref 3.4–5.3)
RBC # BLD AUTO: 3.14 10E12/L (ref 4.4–5.9)
SODIUM SERPL-SCNC: 135 MMOL/L (ref 133–144)
WBC # BLD AUTO: 11 10E9/L (ref 4–11)

## 2021-06-26 PROCEDURE — 99232 SBSQ HOSP IP/OBS MODERATE 35: CPT | Mod: 24 | Performed by: NURSE PRACTITIONER

## 2021-06-26 PROCEDURE — 97530 THERAPEUTIC ACTIVITIES: CPT | Mod: GP | Performed by: REHABILITATION PRACTITIONER

## 2021-06-26 PROCEDURE — 258N000003 HC RX IP 258 OP 636: Performed by: SURGERY

## 2021-06-26 PROCEDURE — 36415 COLL VENOUS BLD VENIPUNCTURE: CPT | Performed by: SURGERY

## 2021-06-26 PROCEDURE — 250N000013 HC RX MED GY IP 250 OP 250 PS 637: Performed by: SURGERY

## 2021-06-26 PROCEDURE — 250N000011 HC RX IP 250 OP 636: Performed by: STUDENT IN AN ORGANIZED HEALTH CARE EDUCATION/TRAINING PROGRAM

## 2021-06-26 PROCEDURE — 250N000011 HC RX IP 250 OP 636

## 2021-06-26 PROCEDURE — 97116 GAIT TRAINING THERAPY: CPT | Mod: GP | Performed by: REHABILITATION PRACTITIONER

## 2021-06-26 PROCEDURE — 85027 COMPLETE CBC AUTOMATED: CPT | Performed by: SURGERY

## 2021-06-26 PROCEDURE — 83605 ASSAY OF LACTIC ACID: CPT | Performed by: SURGERY

## 2021-06-26 PROCEDURE — 120N000002 HC R&B MED SURG/OB UMMC

## 2021-06-26 PROCEDURE — 74018 RADEX ABDOMEN 1 VIEW: CPT

## 2021-06-26 PROCEDURE — C9113 INJ PANTOPRAZOLE SODIUM, VIA: HCPCS | Performed by: STUDENT IN AN ORGANIZED HEALTH CARE EDUCATION/TRAINING PROGRAM

## 2021-06-26 PROCEDURE — 83735 ASSAY OF MAGNESIUM: CPT | Performed by: SURGERY

## 2021-06-26 PROCEDURE — 250N000009 HC RX 250: Performed by: STUDENT IN AN ORGANIZED HEALTH CARE EDUCATION/TRAINING PROGRAM

## 2021-06-26 PROCEDURE — 250N000009 HC RX 250: Performed by: SURGERY

## 2021-06-26 PROCEDURE — 999N001017 HC STATISTIC GLUCOSE BY METER IP

## 2021-06-26 PROCEDURE — 74018 RADEX ABDOMEN 1 VIEW: CPT | Mod: 26 | Performed by: RADIOLOGY

## 2021-06-26 PROCEDURE — 80048 BASIC METABOLIC PNL TOTAL CA: CPT | Performed by: SURGERY

## 2021-06-26 RX ORDER — METOPROLOL TARTRATE 1 MG/ML
10 INJECTION, SOLUTION INTRAVENOUS EVERY 4 HOURS
Status: DISCONTINUED | OUTPATIENT
Start: 2021-06-26 | End: 2021-06-27

## 2021-06-26 RX ADMIN — METOPROLOL TARTRATE 10 MG: 5 INJECTION INTRAVENOUS at 21:26

## 2021-06-26 RX ADMIN — PANTOPRAZOLE SODIUM 40 MG: 40 INJECTION, POWDER, FOR SOLUTION INTRAVENOUS at 09:00

## 2021-06-26 RX ADMIN — SODIUM CHLORIDE: 9 INJECTION, SOLUTION INTRAVENOUS at 20:26

## 2021-06-26 RX ADMIN — ACETAMINOPHEN 975 MG: 325 TABLET, FILM COATED ORAL at 04:01

## 2021-06-26 RX ADMIN — SODIUM CHLORIDE: 9 INJECTION, SOLUTION INTRAVENOUS at 07:12

## 2021-06-26 RX ADMIN — LABETALOL HYDROCHLORIDE 10 MG: 5 INJECTION, SOLUTION INTRAVENOUS at 15:39

## 2021-06-26 RX ADMIN — METOPROLOL TARTRATE 10 MG: 5 INJECTION INTRAVENOUS at 04:01

## 2021-06-26 RX ADMIN — METOPROLOL TARTRATE 10 MG: 5 INJECTION INTRAVENOUS at 09:03

## 2021-06-26 RX ADMIN — HEPARIN SODIUM 5000 UNITS: 5000 INJECTION, SOLUTION INTRAVENOUS; SUBCUTANEOUS at 20:12

## 2021-06-26 RX ADMIN — LABETALOL HYDROCHLORIDE 10 MG: 5 INJECTION, SOLUTION INTRAVENOUS at 20:26

## 2021-06-26 RX ADMIN — METOPROLOL TARTRATE 10 MG: 5 INJECTION INTRAVENOUS at 13:02

## 2021-06-26 RX ADMIN — HEPARIN SODIUM 5000 UNITS: 5000 INJECTION, SOLUTION INTRAVENOUS; SUBCUTANEOUS at 08:54

## 2021-06-26 RX ADMIN — METOPROLOL TARTRATE 10 MG: 5 INJECTION INTRAVENOUS at 17:11

## 2021-06-26 ASSESSMENT — ACTIVITIES OF DAILY LIVING (ADL)
ADLS_ACUITY_SCORE: 18

## 2021-06-26 NOTE — PROGRESS NOTES
Spoke with attending MD Dr. Muller 2x today about NG tube. At his direction backed the NG tube our 2 cm after he confirmed placement via X-ray. Repositioning the tube, shortening the tubing and changing the filter did not lead to any increase in output. Updated provider and he wants to keep NG in place for now.Patient does report passing gas today and has hypoactive BS in all 4 quadrants.

## 2021-06-26 NOTE — PROGRESS NOTES
"Colorectal Surgery Progress Note    Subjective: No acute events overnight. Felt much better after NG placement yesterday; bloating and hiccups today with decreased NG output. Urinating without difficulty. Ambulating around unit. No gas or stool since 6/24. Feels depressed.     Objective:   BP (!) 147/76 (BP Location: Right arm)   Pulse 94   Temp 97  F (36.1  C) (Temporal)   Resp 18   Ht 1.727 m (5' 8\")   Wt 94.6 kg (208 lb 8.9 oz)   SpO2 93%   BMI 31.71 kg/m      PE:  Gen: alert, oriented, standing   HEENT: NGT in place  Resp: non-labored breathing on RA  Abd: distended, nontender, no rebound/guarding  Ext: warm and well perfused  Incision: c/d/i    Intake/Output Summary (Last 24 hours) at 6/26/2021 0806  Last data filed at 6/26/2021 0659  Gross per 24 hour   Intake 1230 ml   Output 700 ml   Net 530 ml       Labs reviewed  WBC 11.0, trending down  Hgb 8.5, stable  Lactate 1.0    Imaging reviewed  Portable AXR 6/25:   IMPRESSION: Enteric tube tip and sidehole project over the gastric  fundus. Persistent but improved small bowel dilatation.    A/P: 76M hx MI, s/p CABG, stage 3 renal disease, DM2, HTN, hx lap R hemicolectomy in 2019 for colon cancer (pT2N0; 0/23 LN+) and surveillance colonoscopy revealing recurrent unresectable polyp concerning for cancer recurrence (although path showed tubulovillous adenoma) now s/p transverse colectomy 6/22 c/b ileus 6/25. Decreased NG output and continued bloating. Continue to monitor and hopefully remove NG tube in the next day or two once ileus resolves. Discussed pathology results of adenoma.    - NPO  - NS 75/hr  - NGT to LIS  - IV PPI  - Hold home amlodipine, HCTZ, spironolactone.  - Metoprolol q4h and labetalol 10-20mg q4h prn.   - Appreciate endocrine assistance with diabetes management  - PT/OT  - Ppx: heparin BID (renal function)  - Dispo: home early next week pending ileus resolution and ROBF. 30 days Lovenox at discharge    Corinne Praska, MS4    Seen and " discussed with Dr. Charles and Dr. Muller.    Fellow addendum  Agree with above.    Mario Charles MD PhD  CRS Fellow

## 2021-06-26 NOTE — PROGRESS NOTES
"                          Diabetes Consult Daily  Progress Note          Assessment/Plan:     HPI:  Da is a 76 year old male with a history of HTN, CKD III, hx CAD s/p CABG, HLP, TIIDM, obesity, and history of colon cancer s/p lap right hemicolectomy with a new unresectable polyp who was admitted 6/22/21 for recurrent ileocolic bowel resection.     Assessment:     1)Type II Diabetes Mellitus, suboptimally controlled (A1c 8.8%), with post operative hyperglycemia       NGT decompression for ileus/obstruction progressing overnight, with attempt to advance diet yesterday.      Plan:                  - Lantus 16 units daily AM (conservative increase, pt has been hesitant to receive Lantus)                 -aspart 1:18g CHO coverage with meals and snacks/supplements- won't receive as he is currently NPO                 -aspart moderate intensity sliding scale, change to q4h, now NPO                 -BG monitoring changed to q4h, now NPO                 -hypoglycemia protocol                 -carb counting protocol                 -will assess for resumption in Metformin/Glipizide once tolerating diet advancement, pending stability in renal function.                  -diabetes education needs are not identified; he recently started Lantus insulin and is comfortable with injections.                  -on discharge, will recommend outpatient follow up with endocrinology service within 2-3 weeks for reassessment.     Plan discussed with patient,  and RN/primary team via this note.                Interval History:     The last 24 hours progress and nursing notes reviewed.  No acute events this interval.  Remains NPO with  NGT in-situ.  BG remain stable on current regimen:      Patient expressing frustration but overall feeling \"ok\".  Some return of abdominal \"bloating\" but  Denying pains.     Ok with NPO - \"food here is cardboard anyway\".  No additional questions or concerns.    Recent Labs   Lab 06/26/21  0418 06/26/21  0409 " "21  0048 21  1605 21  1221 21  0920 21  0642 21  2242 21  0701 21  0701 21  0715 21  0715 21  1358 21  1358   *  --   --   --   --   --   --  252* 182*  --  126*  --  209*  --  225*   BGM  --  152* 184* 153* 184* 215* 231*  --   --    < >  --    < >  --    < >  --     < > = values in this interval not displayed.           Nutrition:     Orders Placed This Encounter      Diet      NPO per Anesthesia Guidelines for Procedure/Surgery Except for: Meds          PTA Regimen:     BG monitorin-3 times daily  Metformin IR 1000 mg BID WM  Glipizide IR 10mg BID WM  Lantus 20 units daily HS          Review of Systems:   CC:  \"stomach is getting bloated again\"    Constitutional:   No fever/chills  ENT/Mouth:   No hearing changes, no ear pain, no sore throat, no rhinorrhea, no difficulty swallowing  Eyes:  No eye pain, no discharge, no vision changes  CV:   No CP/SOB, no new edema  Resp:  No cough, no wheezing  GI:   No N/V, denies constipation, no diarrhea  :  No dysuria, frequency, or hematuria  Musk:  No new joint swelling/pain, denies back pain  Skin/heme:   No new rashes/bruises/open areas.  No pruritis  Neuro:   No new weakness, no changes to numbness/tingling, no headache  Psych:   No new anxiety, denies depression  Endocrine:  No polyuria or polydipsia         Medications:   Steroid planning:  none       Physical Exam:   BP (!) 147/76 (BP Location: Right arm)   Pulse 94   Temp 97  F (36.1  C) (Temporal)   Resp 18   Ht 1.727 m (5' 8\")   Wt 94.6 kg (208 lb 8.9 oz)   SpO2 93%   BMI 31.71 kg/m      General:   NAD,  resting comfortably in bed.   HEENT:  NC/AT. MMM, sclera not injected  Lungs:  unremarkable, no new cough, no SOB  ABD:   Distended, obese  Skin:  warm and dry, no obvious lesions/rash  Feet:   CMS intact, PPP  MSK:   moving all extremities  Lymp:   no LE edema  Mental Status:  Alert and oriented x3  Psych:   " Cooperative, good eye contact, full affect          Data:     Lab Results   Component Value Date    A1C 8.8 06/18/2021    A1C 8.9 05/05/2021        CBC RESULTS:   Recent Labs   Lab Test 06/26/21 0418   WBC 11.0   RBC 3.14*   HGB 8.5*   HCT 26.8*   MCV 85   MCH 27.1   MCHC 31.7   RDW 15.1*        Recent Labs   Lab Test 06/26/21 0418 06/25/21  0642    130*   POTASSIUM 3.8 3.8   CHLORIDE 102 97   CO2 23 25   ANIONGAP 10 8   * 252*   BUN 24 18   CR 1.10 1.08   BOOGIE 8.2* 8.1*     Liver Function Studies -   Recent Labs   Lab Test 06/18/21  1540   PROTTOTAL 7.4   ALBUMIN 3.7   BILITOTAL 0.6   ALKPHOS 92   AST 20   ALT 43     No results found for: INR    ARON Kauffman CNP   Inpatient Diabetes Management Service  Pager - [phone]  Friday - Monday 0800 - 1600 hrs  After hours above - Diabetes Management Team job code: [phone]    I spent a total of 25 minutes bedside and on the inpatient unit managing glycemic care.  Over 50% of my time on the unit was spent counseling the patient and/or coordinating care regarding acute hyperglycemic management.  See note for details.

## 2021-06-26 NOTE — PLAN OF CARE
Time: 1366-9708    Reason for Admission: POD #3 transverse colectomy.     Activity: SBA    Neuro: A&O x4. Calm and cooperative.     GI/: Voiding spontaneously without difficulty with urinal. No BM this shift.     Diet: NPO, except meds.      Incisions/Drains: NG in place to LIS.     IV Access: R PIV saline locked. L PIV infusing NS at 75mL/hr.     Labs: B and 153.    Vitals: VSS on RA    Pain: Pt denied any pain this shift. Scheduled tylenol given.     New changes this shift: None    Plan: Continue with plan of care.

## 2021-06-26 NOTE — PLAN OF CARE
VS: AVSS on RA, A&Ox4  Pain: denies, c/o abdominal discomfort/bloating  Diet: NPO  Ambulation: encouraged to walk more  Bowel:passing gas, no BM this shift  Urinary: voiding independently  LDAs: PIV x2, NG at 62    Events: NG tube adjusted this shift to attempt to get it to output, adjustment not effective, MD aware

## 2021-06-26 NOTE — PROGRESS NOTES
Neuro: Alert and oriented x4  Activity: SBA; Patient ambulated length of hallway one time.   Respiratory:  Encouraging IS. Able to reach 1500 on IS.  Cardiac: WNL; hypertension treated with metroprolol  Diet:  NPO  Incisions: Abdominal incision with derma bond.    IV access: 2 PIVs.  Right hand infusing. Left hand saline lock.  Pain: Well controlled. Tylenol held by HCP.  GI: Denies nausea. Distended abdomen. NG tube.  : Urinating independently.      Patient's left leg is cool to touch. Patient reported numbness/tingling in calves.

## 2021-06-26 NOTE — PLAN OF CARE
Pt AVSS. Pt sleeping at short intervals tonight. Abd remains distended, semifirm. NPO cont. NG to LIS-scant green output noted. Irrigated x1, no issues noted. Pt denied any c/o's pain. Sched tylenol given but no prn pain meds needed. Pt up in chair this am. IVF at 75/hr. Voiding good amts. BS checks [id number]=188, 152 both with s/s coverage. LE's with trace edema. Cont to monitor return of bowel fxn. Enc increased activity today.    Addendum: Pt triggered SIRS at 0400. Lactic done=1.0, -110. WBC still elevated 11k

## 2021-06-27 LAB
ANION GAP SERPL CALCULATED.3IONS-SCNC: 9 MMOL/L (ref 3–14)
BUN SERPL-MCNC: 24 MG/DL (ref 7–30)
CALCIUM SERPL-MCNC: 8.1 MG/DL (ref 8.5–10.1)
CHLORIDE SERPL-SCNC: 106 MMOL/L (ref 94–109)
CO2 SERPL-SCNC: 23 MMOL/L (ref 20–32)
CREAT SERPL-MCNC: 1.11 MG/DL (ref 0.66–1.25)
GFR SERPL CREATININE-BSD FRML MDRD: 65 ML/MIN/{1.73_M2}
GLUCOSE BLDC GLUCOMTR-MCNC: 146 MG/DL (ref 70–99)
GLUCOSE BLDC GLUCOMTR-MCNC: 151 MG/DL (ref 70–99)
GLUCOSE BLDC GLUCOMTR-MCNC: 162 MG/DL (ref 70–99)
GLUCOSE BLDC GLUCOMTR-MCNC: 166 MG/DL (ref 70–99)
GLUCOSE BLDC GLUCOMTR-MCNC: 166 MG/DL (ref 70–99)
GLUCOSE BLDC GLUCOMTR-MCNC: 167 MG/DL (ref 70–99)
GLUCOSE BLDC GLUCOMTR-MCNC: 169 MG/DL (ref 70–99)
GLUCOSE SERPL-MCNC: 184 MG/DL (ref 70–99)
MAGNESIUM SERPL-MCNC: 2.3 MG/DL (ref 1.6–2.3)
POTASSIUM SERPL-SCNC: 3.9 MMOL/L (ref 3.4–5.3)
SODIUM SERPL-SCNC: 138 MMOL/L (ref 133–144)

## 2021-06-27 PROCEDURE — 80048 BASIC METABOLIC PNL TOTAL CA: CPT | Performed by: SURGERY

## 2021-06-27 PROCEDURE — 250N000013 HC RX MED GY IP 250 OP 250 PS 637: Performed by: STUDENT IN AN ORGANIZED HEALTH CARE EDUCATION/TRAINING PROGRAM

## 2021-06-27 PROCEDURE — 36415 COLL VENOUS BLD VENIPUNCTURE: CPT | Performed by: SURGERY

## 2021-06-27 PROCEDURE — 250N000013 HC RX MED GY IP 250 OP 250 PS 637: Performed by: SURGERY

## 2021-06-27 PROCEDURE — 250N000009 HC RX 250: Performed by: SURGERY

## 2021-06-27 PROCEDURE — 250N000011 HC RX IP 250 OP 636

## 2021-06-27 PROCEDURE — 999N001017 HC STATISTIC GLUCOSE BY METER IP

## 2021-06-27 PROCEDURE — 120N000002 HC R&B MED SURG/OB UMMC

## 2021-06-27 PROCEDURE — 83735 ASSAY OF MAGNESIUM: CPT | Performed by: SURGERY

## 2021-06-27 PROCEDURE — 258N000003 HC RX IP 258 OP 636: Performed by: SURGERY

## 2021-06-27 PROCEDURE — 250N000011 HC RX IP 250 OP 636: Performed by: STUDENT IN AN ORGANIZED HEALTH CARE EDUCATION/TRAINING PROGRAM

## 2021-06-27 PROCEDURE — C9113 INJ PANTOPRAZOLE SODIUM, VIA: HCPCS | Performed by: STUDENT IN AN ORGANIZED HEALTH CARE EDUCATION/TRAINING PROGRAM

## 2021-06-27 PROCEDURE — 99232 SBSQ HOSP IP/OBS MODERATE 35: CPT | Mod: 24 | Performed by: NURSE PRACTITIONER

## 2021-06-27 RX ORDER — METOPROLOL SUCCINATE 100 MG/1
100 TABLET, EXTENDED RELEASE ORAL DAILY
Status: DISCONTINUED | OUTPATIENT
Start: 2021-06-27 | End: 2021-06-28

## 2021-06-27 RX ADMIN — ACETAMINOPHEN 975 MG: 325 TABLET, FILM COATED ORAL at 14:39

## 2021-06-27 RX ADMIN — GABAPENTIN 300 MG: 300 CAPSULE ORAL at 21:36

## 2021-06-27 RX ADMIN — METOPROLOL SUCCINATE 100 MG: 100 TABLET, EXTENDED RELEASE ORAL at 14:38

## 2021-06-27 RX ADMIN — ACETAMINOPHEN 975 MG: 325 TABLET, FILM COATED ORAL at 21:36

## 2021-06-27 RX ADMIN — GABAPENTIN 300 MG: 300 CAPSULE ORAL at 16:14

## 2021-06-27 RX ADMIN — PANTOPRAZOLE SODIUM 40 MG: 40 INJECTION, POWDER, FOR SOLUTION INTRAVENOUS at 08:47

## 2021-06-27 RX ADMIN — METOPROLOL TARTRATE 10 MG: 5 INJECTION INTRAVENOUS at 02:07

## 2021-06-27 RX ADMIN — SODIUM CHLORIDE: 9 INJECTION, SOLUTION INTRAVENOUS at 09:06

## 2021-06-27 RX ADMIN — HEPARIN SODIUM 5000 UNITS: 5000 INJECTION, SOLUTION INTRAVENOUS; SUBCUTANEOUS at 21:36

## 2021-06-27 RX ADMIN — METOPROLOL TARTRATE 10 MG: 5 INJECTION INTRAVENOUS at 11:12

## 2021-06-27 RX ADMIN — HEPARIN SODIUM 5000 UNITS: 5000 INJECTION, SOLUTION INTRAVENOUS; SUBCUTANEOUS at 08:47

## 2021-06-27 RX ADMIN — LABETALOL HYDROCHLORIDE 20 MG: 5 INJECTION, SOLUTION INTRAVENOUS at 23:45

## 2021-06-27 RX ADMIN — METOPROLOL TARTRATE 10 MG: 5 INJECTION INTRAVENOUS at 06:08

## 2021-06-27 ASSESSMENT — ACTIVITIES OF DAILY LIVING (ADL)
ADLS_ACUITY_SCORE: 18

## 2021-06-27 NOTE — PLAN OF CARE
Time: 1832-9057    Reason for Admission: POD #4 transverse colectomy.     Activity: SBA. Pt ambulated in halls x1.     Neuro: A&O x4. Calm and cooperative.     GI/: Voiding spontaneously without difficulty with urinal. No BM this shift.     Diet: NPO, except meds.      Incisions/Drains: NG in place to LIS, minimal output this shift.     IV Access: R PIV saline locked. L PIV infusing NS at 75mL/hr.     Labs: B and 164.    Vitals: VSS on RA    Pain: Pt denied any pain this shift.      New changes this shift: None    Plan: Continue with plan of care.

## 2021-06-27 NOTE — PLAN OF CARE
"Transverse colectomy 6/22 c/b ileus 6/25    Pain: denies pain   Nausea: denies   Lab: BS Q4 hours, 1 unit administered x 2 occurrences   BP (!) 154/73 (BP Location: Right arm)   Pulse 99   Temp 98.8  F (37.1  C) (Temporal)   Resp 18   Ht 1.727 m (5' 8\")   Wt 94.6 kg (208 lb 8.9 oz)   SpO2 95%   BMI 31.71 kg/m   - hypertensive, receiving scheduled metoprolol   Output: Adequately voiding   Diet: Clears   Activity: UAL   Bowel Function: Bowel sounds hypoactive in all quadrants, abdomen rounded and distended, passing flatus, no bm this shift- NG removed this shfit   Incision: Midline incision, liquid bandage, no drainage, ALEC   Lines: L PIV, infusing MIVF @ 75 ml/hr, dressing CDI   Plan: Continue to monitor pain, nausea, VS, and ROBF  "

## 2021-06-27 NOTE — PLAN OF CARE
Pt AVSS. Pt sleeping between cares until 0400 then up in chair watching tv. Pt denied any c/o's pain. Abd dist, soft, hypo bs noted. Midline dermabond D/I, lap sites x3 intact. Lungs clear, dim in bases. BS checks q 4hr= 166, 169 with s/s coverage. IVF at 75/hr via piv. Voiding via urinal at bs. Pt up to BR x1 SBA and stated he had a small hard stool, not seen by this RN. NG to LIS with no output. NG irrigated, placement checked. Cont to monitor return of bowel fxn.

## 2021-06-27 NOTE — PROGRESS NOTES
Diabetes Consult Daily  Progress Note          Assessment/Plan:     HPI:  Da is a 76 year old male with a history of HTN, CKD III, hx CAD s/p CABG, HLP, TIIDM, obesity, and history of colon cancer s/p lap right hemicolectomy with a new unresectable polyp who was admitted 6/22/21 for recurrent ileocolic bowel resection.     Assessment:     1) Type II Diabetes Mellitus, suboptimally controlled (A1c 8.8%), with post operative hyperglycemia          Plan:                  - Lantus 16 units daily AM                 - aspart 1:18g CHO coverage with meals and snacks/supplements- won't receive as he is currently NPO - please advise once NPO removed.                  -aspart moderate intensity sliding scale, change to q4h, now NPO                 -BG monitoring changed to q4h, now NPO                 -hypoglycemia protocol                 -carb counting protocol                 -will assess for resumption in Metformin/Glipizide once tolerating diet advancement, pending stability in renal function.                  -diabetes education needs are not identified; he recently started Lantus insulin and is comfortable with injections.                  -on discharge, will recommend outpatient follow up with endocrinology service within 2-3 weeks for reassessment.     Plan discussed with patient,  and RN/primary team via this note.                Interval History:     The last 24 hours progress and nursing notes reviewed.  No acute events this interval.  Remains NPO with  NGT in-situ.        BG remain stable on current regimen:  Will need an update if the plan changing with the tube and if plans to advance diet - please call Endo/IDS service    No additional questions or concerns. Da really ready to go home.     Recent Labs   Lab 06/27/21  0413 06/26/21  2352 06/26/21  1955 06/26/21  1556 06/26/21  1141 06/26/21  0831 06/26/21  0418 06/25/21  0642 06/25/21  0642 06/24/21  2242 06/24/21  0701  "21  0701 21  0715 21  0715 21  1358 21  1358   GLC  --   --   --   --   --   --  170*  --  252* 182*  --  126*  --  209*  --  225*   * 166* 164* 201* 186* 178*  --    < >  --   --    < >  --    < >  --    < >  --     < > = values in this interval not displayed.           Nutrition:     Orders Placed This Encounter      Diet      NPO per Anesthesia Guidelines for Procedure/Surgery Except for: Meds          PTA Regimen:     BG monitorin-3 times daily  Metformin IR 1000 mg BID WM  Glipizide IR 10mg BID WM  Lantus 20 units daily HS          Review of Systems:   CC:  \"stomach bit better, I'm just really ready to get out of here\"    Constitutional:   No fever/chills  ENT/Mouth:   No hearing changes, no ear pain, no sore throat, no rhinorrhea, no difficulty swallowing  Eyes:  No eye pain, no discharge, no vision changes  CV:   No CP/SOB, no new edema  Resp:  No cough, no wheezing  GI:   No N/V, denies constipation, no diarrhea  :  No dysuria, frequency, or hematuria  Musk:  No new joint swelling/pain, denies back pain  Skin/heme:   No new rashes/bruises/open areas.  No pruritis  Neuro:   No new weakness, no changes to numbness/tingling, no headache  Psych:   No new anxiety, denies depression  Endocrine:  No polyuria or polydipsia         Medications:   Steroid planning:  none       Physical Exam:   BP (!) 148/70 (BP Location: Left arm)   Pulse 101   Temp 98.1  F (36.7  C) (Oral)   Resp 18   Ht 1.727 m (5' 8\")   Wt 94.6 kg (208 lb 8.9 oz)   SpO2 93%   BMI 31.71 kg/m      General:   NAD,  resting comfortably in bed. NGT in-place  HEENT:  NC/AT. MMM, sclera not injected  Lungs:  unremarkable, no new cough, no SOB  ABD:   Distended, obese  Skin:  warm and dry, no obvious lesions/rash  Feet:   CMS intact, PPP  MSK:   moving all extremities  Lymp:   no LE edema  Mental Status:  Alert and oriented x3  Psych:   Cooperative, good eye contact, full affect          Data:     Lab " Results   Component Value Date    A1C 8.8 06/18/2021    A1C 8.9 05/05/2021          Most recent labs:  Lab Results   Component Value Date    WBC 11.0 06/26/2021     Lab Results   Component Value Date    RBC 3.14 06/26/2021     Lab Results   Component Value Date    HGB 8.5 06/26/2021     Lab Results   Component Value Date    HCT 26.8 06/26/2021     No components found for: MCT  Lab Results   Component Value Date    MCV 85 06/26/2021     Lab Results   Component Value Date    MCH 27.1 06/26/2021     Lab Results   Component Value Date    MCHC 31.7 06/26/2021     Lab Results   Component Value Date    RDW 15.1 06/26/2021     Lab Results   Component Value Date     06/26/2021     Recent Labs   Lab Test 06/26/21  0418 06/25/21  0642    130*   POTASSIUM 3.8 3.8   CHLORIDE 102 97   CO2 23 25   ANIONGAP 10 8   * 252*   BUN 24 18   CR 1.10 1.08   BOOGIE 8.2* 8.1*         Liver Function Studies -   Recent Labs   Lab Test 06/18/21  1540   PROTTOTAL 7.4   ALBUMIN 3.7   BILITOTAL 0.6   ALKPHOS 92   AST 20   ALT 43     No results found for: INR    ARON Kauffman CNP   Inpatient Diabetes Management Service  Pager - [phone]  Friday - Monday 0800 - 1600 hrs  After hours above - Diabetes Management Team job code: [phone]    I spent a total of 25 minutes bedside and on the inpatient unit managing glycemic care.  Over 50% of my time on the unit was spent counseling the patient and/or coordinating care regarding acute hyperglycemic management.  See note for details.

## 2021-06-27 NOTE — PROGRESS NOTES
"  Reason for Admission: POD #4 transverse colectomy.     Activity: SBA. Pt ambulated in halls x1.     Neuro: A&O x4. Calm and cooperative.     GI/: Voiding spontaneously without difficulty with urinal. No BM this shift. Patient stated his last BM was 6/26/2021.Hypoactive bowel sounds. Denies nausea. Distended abdomen.    Respiratory: Encouraging IS. Patient declined.    Cardiac: WNL; HTN treated with metoprolol 10 mg q4h.     Diet: NPO.     Incisions/Drains: NG tube removed. Abdominal incision with derma bond: clean, dry and intact.      IV Access:  L PIV infusing NS at 75mL/hr. .    Vitals: VSS on RA    Pain: Pt denied any pain this shift.      New changes this shift: NG tube removal.    Hygiene care: showered. Teeth brushed. Mouth wash provided; linens changed.     Plan: Continue with plan of care.    Notes: Patient spent the majority of the day in his chair resting. Stated he \"feels a lot better\" without the NG tube. Pain well controlled without medications. Continue to monitor for pain, nausea and bowel movements.   "

## 2021-06-27 NOTE — PROGRESS NOTES
"Colorectal Surgery Progress Note    Subjective: No acute events. Low volume NGT output; 50cc last 24 hours. One small BM overnight, passing flatus. Pain controlled.    Objective:   BP (!) 148/70 (BP Location: Left arm)   Pulse 101   Temp 98.1  F (36.7  C) (Oral)   Resp 18   Ht 1.727 m (5' 8\")   Wt 94.6 kg (208 lb 8.9 oz)   SpO2 93%   BMI 31.71 kg/m      PE:  Gen: alert, oriented, standing   HEENT: NGT in place  Resp: non-labored breathing on RA  Abd: distended, nontender, no rebound/guarding  Ext: warm and well perfused  Incision: c/d/i    Intake/Output Summary (Last 24 hours) at 6/26/2021 0806  Last data filed at 6/26/2021 0659  Gross per 24 hour   Intake 1230 ml   Output 700 ml   Net 530 ml       Labs pending      A/P: 76M hx MI, s/p CABG, stage 3 renal disease, DM2, HTN, hx lap R hemicolectomy in 2019 for colon cancer (pT2N0; 0/23 LN+) and surveillance colonoscopy revealing recurrent unresectable polyp concerning for cancer recurrence (although path showed tubulovillous adenoma) now s/p transverse colectomy 6/22 c/b ileus 6/25. Decreased NG output and continued bloating. Continue to monitor and hopefully remove NG tube in the next day or two once ileus resolves. Discussed pathology results of adenoma.    - Remove NGT; can adv to clear liquids at noon if no n/v  - NS 75/hr  - IV PPI  - Metoprolol q4h and labetalol 10-20mg q4h prn.   - Appreciate endocrine assistance with diabetes management  - Ppx: heparin BID (renal function)  - Dispo: home early next week pending ileus resolution and ROBF. 30 days Lovenox at discharge    Seen and discussed with fellow and staff.    Sae Hernandez MD  Surgery PGY-1      "

## 2021-06-28 ENCOUNTER — APPOINTMENT (OUTPATIENT)
Dept: OCCUPATIONAL THERAPY | Facility: CLINIC | Age: 76
DRG: 330 | End: 2021-06-28
Attending: SURGERY
Payer: MEDICARE

## 2021-06-28 ENCOUNTER — APPOINTMENT (OUTPATIENT)
Dept: GENERAL RADIOLOGY | Facility: CLINIC | Age: 76
DRG: 330 | End: 2021-06-28
Attending: STUDENT IN AN ORGANIZED HEALTH CARE EDUCATION/TRAINING PROGRAM
Payer: MEDICARE

## 2021-06-28 ENCOUNTER — APPOINTMENT (OUTPATIENT)
Dept: CT IMAGING | Facility: CLINIC | Age: 76
DRG: 330 | End: 2021-06-28
Attending: SURGERY
Payer: MEDICARE

## 2021-06-28 LAB
ANION GAP SERPL CALCULATED.3IONS-SCNC: 9 MMOL/L (ref 3–14)
BUN SERPL-MCNC: 22 MG/DL (ref 7–30)
CALCIUM SERPL-MCNC: 8.2 MG/DL (ref 8.5–10.1)
CHLORIDE SERPL-SCNC: 106 MMOL/L (ref 94–109)
CO2 SERPL-SCNC: 24 MMOL/L (ref 20–32)
CREAT SERPL-MCNC: 1.04 MG/DL (ref 0.66–1.25)
ERYTHROCYTE [DISTWIDTH] IN BLOOD BY AUTOMATED COUNT: 15.5 % (ref 10–15)
GFR SERPL CREATININE-BSD FRML MDRD: 70 ML/MIN/{1.73_M2}
GLUCOSE BLDC GLUCOMTR-MCNC: 139 MG/DL (ref 70–99)
GLUCOSE BLDC GLUCOMTR-MCNC: 147 MG/DL (ref 70–99)
GLUCOSE BLDC GLUCOMTR-MCNC: 148 MG/DL (ref 70–99)
GLUCOSE BLDC GLUCOMTR-MCNC: 149 MG/DL (ref 70–99)
GLUCOSE BLDC GLUCOMTR-MCNC: 159 MG/DL (ref 70–99)
GLUCOSE BLDC GLUCOMTR-MCNC: 159 MG/DL (ref 70–99)
GLUCOSE BLDC GLUCOMTR-MCNC: 167 MG/DL (ref 70–99)
GLUCOSE SERPL-MCNC: 166 MG/DL (ref 70–99)
HCT VFR BLD AUTO: 24.4 % (ref 40–53)
HGB BLD-MCNC: 7.3 G/DL (ref 13.3–17.7)
MAGNESIUM SERPL-MCNC: 2.3 MG/DL (ref 1.6–2.3)
MCH RBC QN AUTO: 26.4 PG (ref 26.5–33)
MCHC RBC AUTO-ENTMCNC: 29.9 G/DL (ref 31.5–36.5)
MCV RBC AUTO: 88 FL (ref 78–100)
PLATELET # BLD AUTO: 340 10E9/L (ref 150–450)
POTASSIUM SERPL-SCNC: 3.8 MMOL/L (ref 3.4–5.3)
RBC # BLD AUTO: 2.77 10E12/L (ref 4.4–5.9)
SODIUM SERPL-SCNC: 139 MMOL/L (ref 133–144)
WBC # BLD AUTO: 5.9 10E9/L (ref 4–11)

## 2021-06-28 PROCEDURE — 97165 OT EVAL LOW COMPLEX 30 MIN: CPT | Mod: GO

## 2021-06-28 PROCEDURE — 120N000002 HC R&B MED SURG/OB UMMC

## 2021-06-28 PROCEDURE — 36415 COLL VENOUS BLD VENIPUNCTURE: CPT | Performed by: SURGERY

## 2021-06-28 PROCEDURE — 250N000013 HC RX MED GY IP 250 OP 250 PS 637: Performed by: STUDENT IN AN ORGANIZED HEALTH CARE EDUCATION/TRAINING PROGRAM

## 2021-06-28 PROCEDURE — 258N000003 HC RX IP 258 OP 636: Performed by: SURGERY

## 2021-06-28 PROCEDURE — 85027 COMPLETE CBC AUTOMATED: CPT | Performed by: SURGERY

## 2021-06-28 PROCEDURE — 999N000065 XR ABDOMEN PORT 1 VIEWS

## 2021-06-28 PROCEDURE — 99232 SBSQ HOSP IP/OBS MODERATE 35: CPT | Mod: 24 | Performed by: NURSE PRACTITIONER

## 2021-06-28 PROCEDURE — 250N000011 HC RX IP 250 OP 636: Performed by: STUDENT IN AN ORGANIZED HEALTH CARE EDUCATION/TRAINING PROGRAM

## 2021-06-28 PROCEDURE — 74018 RADEX ABDOMEN 1 VIEW: CPT | Mod: 26 | Performed by: RADIOLOGY

## 2021-06-28 PROCEDURE — 74177 CT ABD & PELVIS W/CONTRAST: CPT

## 2021-06-28 PROCEDURE — 250N000011 HC RX IP 250 OP 636

## 2021-06-28 PROCEDURE — 83735 ASSAY OF MAGNESIUM: CPT | Performed by: SURGERY

## 2021-06-28 PROCEDURE — 97535 SELF CARE MNGMENT TRAINING: CPT | Mod: GO

## 2021-06-28 PROCEDURE — 97530 THERAPEUTIC ACTIVITIES: CPT | Mod: GO

## 2021-06-28 PROCEDURE — 258N000001 HC RX 258: Performed by: STUDENT IN AN ORGANIZED HEALTH CARE EDUCATION/TRAINING PROGRAM

## 2021-06-28 PROCEDURE — 74177 CT ABD & PELVIS W/CONTRAST: CPT | Mod: 26 | Performed by: RADIOLOGY

## 2021-06-28 PROCEDURE — 999N001017 HC STATISTIC GLUCOSE BY METER IP

## 2021-06-28 PROCEDURE — 250N000013 HC RX MED GY IP 250 OP 250 PS 637: Performed by: SURGERY

## 2021-06-28 PROCEDURE — 80048 BASIC METABOLIC PNL TOTAL CA: CPT | Performed by: SURGERY

## 2021-06-28 RX ORDER — PANTOPRAZOLE SODIUM 40 MG/1
40 TABLET, DELAYED RELEASE ORAL
Status: DISCONTINUED | OUTPATIENT
Start: 2021-06-28 | End: 2021-06-29

## 2021-06-28 RX ORDER — METOPROLOL TARTRATE 1 MG/ML
10 INJECTION, SOLUTION INTRAVENOUS EVERY 6 HOURS
Status: DISCONTINUED | OUTPATIENT
Start: 2021-06-28 | End: 2021-06-30

## 2021-06-28 RX ORDER — METOCLOPRAMIDE HYDROCHLORIDE 5 MG/ML
10 INJECTION INTRAMUSCULAR; INTRAVENOUS EVERY 6 HOURS
Status: COMPLETED | OUTPATIENT
Start: 2021-06-28 | End: 2021-06-30

## 2021-06-28 RX ORDER — IOPAMIDOL 755 MG/ML
127 INJECTION, SOLUTION INTRAVASCULAR ONCE
Status: COMPLETED | OUTPATIENT
Start: 2021-06-28 | End: 2021-06-28

## 2021-06-28 RX ADMIN — METOPROLOL SUCCINATE 100 MG: 100 TABLET, EXTENDED RELEASE ORAL at 09:04

## 2021-06-28 RX ADMIN — METOCLOPRAMIDE HYDROCHLORIDE 10 MG: 5 INJECTION INTRAMUSCULAR; INTRAVENOUS at 18:51

## 2021-06-28 RX ADMIN — LABETALOL HYDROCHLORIDE 20 MG: 5 INJECTION, SOLUTION INTRAVENOUS at 16:27

## 2021-06-28 RX ADMIN — GABAPENTIN 300 MG: 300 CAPSULE ORAL at 09:04

## 2021-06-28 RX ADMIN — DEXTROSE AND SODIUM CHLORIDE: 5; 450 INJECTION, SOLUTION INTRAVENOUS at 21:26

## 2021-06-28 RX ADMIN — GABAPENTIN 300 MG: 300 CAPSULE ORAL at 16:36

## 2021-06-28 RX ADMIN — ATORVASTATIN CALCIUM 40 MG: 40 TABLET, FILM COATED ORAL at 09:04

## 2021-06-28 RX ADMIN — HEPARIN SODIUM 5000 UNITS: 5000 INJECTION, SOLUTION INTRAVENOUS; SUBCUTANEOUS at 09:04

## 2021-06-28 RX ADMIN — ACETAMINOPHEN 325 MG: 325 TABLET, FILM COATED ORAL at 18:50

## 2021-06-28 RX ADMIN — IOPAMIDOL 127 ML: 755 INJECTION, SOLUTION INTRAVENOUS at 11:05

## 2021-06-28 RX ADMIN — LABETALOL HYDROCHLORIDE 20 MG: 5 INJECTION, SOLUTION INTRAVENOUS at 06:35

## 2021-06-28 RX ADMIN — ACETAMINOPHEN 975 MG: 325 TABLET, FILM COATED ORAL at 12:08

## 2021-06-28 RX ADMIN — SODIUM CHLORIDE: 9 INJECTION, SOLUTION INTRAVENOUS at 02:45

## 2021-06-28 RX ADMIN — TAMSULOSIN HYDROCHLORIDE 0.4 MG: 0.4 CAPSULE ORAL at 09:04

## 2021-06-28 RX ADMIN — METOCLOPRAMIDE HYDROCHLORIDE 10 MG: 5 INJECTION INTRAMUSCULAR; INTRAVENOUS at 13:27

## 2021-06-28 RX ADMIN — ACETAMINOPHEN 975 MG: 325 TABLET, FILM COATED ORAL at 05:53

## 2021-06-28 RX ADMIN — HEPARIN SODIUM 5000 UNITS: 5000 INJECTION, SOLUTION INTRAVENOUS; SUBCUTANEOUS at 19:59

## 2021-06-28 RX ADMIN — PANTOPRAZOLE SODIUM 40 MG: 40 TABLET, DELAYED RELEASE ORAL at 09:04

## 2021-06-28 ASSESSMENT — ACTIVITIES OF DAILY LIVING (ADL)
PREVIOUS_RESPONSIBILITIES: MEAL PREP;HOUSEKEEPING;LAUNDRY;MEDICATION MANAGEMENT;FINANCES;DRIVING
ADLS_ACUITY_SCORE: 18

## 2021-06-28 NOTE — PLAN OF CARE
Time: 0274-9746  Reason for Admission: POD #5 transverse colectomy.   Activity: SBA. Pt ambulated in halls x1.   Neuro: A&O x4. Calm and cooperative.   GI/: Voiding spontaneously without difficulty with urinal. No BM this shift.   Diet: Clear liquids    Incisions/Drains: Abdominal incision, CDI.  IV Access: L PIV infusing NS at 50mL/hr.   Labs: B and 151.  Vitals: VSS on RA  Pain: Pt denied any pain this shift.    New changes this shift: None  Plan: Continue with plan of care.

## 2021-06-28 NOTE — PROGRESS NOTES
"Colorectal Surgery Progress Note    Subjective: No acute events. NGT removed 6/27. Tolerating clears, some burping. Has not passed gas or had BM since 6/26 overnight. Denies abd pain, feels slightly bloated.     Objective:   BP (!) 155/79 (BP Location: Right arm)   Pulse 86   Temp 98.9  F (37.2  C) (Temporal)   Resp 17   Ht 1.727 m (5' 8\")   Wt 94.6 kg (208 lb 8.9 oz)   SpO2 95%   BMI 31.71 kg/m      PE:  Gen: sitting up in chair  Cardiac: regular rate  Resp: non-labored breathing on RA  Abd: distended (same degree as 6/27), nontender, no rebound/guarding  Ext: warm and well perfused  Incision: c/d/i    Intake/Output Summary (Last 24 hours) at 6/26/2021 0806  Last data filed at 6/26/2021 0659  Gross per 24 hour   Intake 1230 ml   Output 700 ml   Net 530 ml       A/P: 76M hx MI, s/p CABG, stage 3 renal disease, DM2, HTN, hx lap R hemicolectomy in 2019 for colon cancer (pT2N0; 0/23 LN+) and surveillance colonoscopy revealing recurrent unresectable polyp concerning for cancer recurrence (although path showed tubulovillous adenoma) now s/p transverse colectomy 6/22 c/b ileus 6/25 requiring NG placement (removed 6/27). Tolerating small volume clear liquids but still bloated and without appreciable forward bowel function; obtain CT to further evaluate.    - CT Abd/Pelvis with PO, IV, MN contrast   - Continue clear liquid diet for now  - NS 50/hr  - PO PPI  - PTA metoprolol  - Appreciate endocrine assistance with diabetes management  - Ppx: heparin BID (renal function)  - Dispo: home in coming days pending ileus resolution and ROBF. 30d Lovenox at discharge    Seen and discussed with fellow and staff.    Sae Hernandez MD  Surgery PGY-1      "

## 2021-06-28 NOTE — PLAN OF CARE
7C PT: Pt declined to participate in therapy, reporting he's not feeling well. Encouraged pt to ambulate with staff later this pm. Will reschedule per plan of care.

## 2021-06-28 NOTE — PLAN OF CARE
Hypertensive per baseline but denies symptoms. Metoprolol dose will be increased tomorrow. Patient teary this morning regarding lack of progress and has been very reluctant to ambulate in halls. Has been up in chair all day.  Abdomen taut, distended.  +Flatus, no BM.  Belching on occasion but denies flatus. CT completed; per resident no concerns.  Reglan started.  Declined ordering any clears but drinking water fair with reminders.  Marginal dark jan urine, MD aware. Discharge when ROBF and diet advancement.

## 2021-06-28 NOTE — PLAN OF CARE
[id number] Midline abdominal incision C/D/I with dermabond. Hypoactive bowel sounds. Denies passing flatus, no BM. No nausea or emesis since NG removal yesterday. Abdominal discomfort managed with scheduled Tylenol. Maintenance IV fluids infusing. Voids spontaneously. On clear liquids. BG q4h. Up in chair throughout shift. Hypertensive, PRN Labetalol given per parameters on MAR. OVSS on room air.

## 2021-06-28 NOTE — PROGRESS NOTES
06/28/21 1500   Quick Adds   Type of Visit Initial Occupational Therapy Evaluation   Living Environment   People in home significant other   Current Living Arrangements house   Home Accessibility no concerns   Number of Stairs, Main Entrance 3;other (see comments)  (ramp present but pt does use stairs )   Stair Railings, Main Entrance railings on both sides of stairs   Transportation Anticipated car, drives self   Living Environment Comments Pt lives with his significant other, Marjorie in a ranch style home. Pt reports there is a ramp to enter the home then all needs are met on the main level,    Self-Care   Usual Activity Tolerance good   Current Activity Tolerance moderate   Regular Exercise No   Equipment Currently Used at Home none   Activity/Exercise/Self-Care Comment Pt does not report using any AE at baseline.    Disability/Function   Hearing Difficulty or Deaf no   Wear Glasses or Blind no   Concentrating, Remembering or Making Decisions Difficulty no   Difficulty Communicating no   Difficulty Eating/Swallowing no   Walking or Climbing Stairs Difficulty no   Dressing/Bathing Difficulty no   Toileting issues no   Doing Errands Independently Difficulty (such as shopping) no   Fall history within last six months no   Change in Functional Status Since Onset of Current Illness/Injury yes   General Information   Onset of Illness/Injury or Date of Surgery 06/22/21   Referring Physician Sae Hernandez MD   Patient/Family Therapy Goal Statement (OT) return home, gain functional independence    Additional Occupational Profile Info/Pertinent History of Current Problem Per chart: 76M hx MI, s/p CABG, stage 3 renal disease, DM2, HTN, hx lap R hemicolectomy in 2019 for colon cancer (pT2N0; 0/23 LN+) and surveillance colonoscopy revealing recurrent unresectable polyp concerning for cancer recurrence (although path showed tubulovillous adenoma) now s/p transverse colectomy 6/22 c/b ileus 6/25 requiring NG placement  (removed 6/27). Tolerating small volume clear liquids but still bloated and without appreciable forward bowel function; obtain CT to further evaluate.   Performance Patterns (Routines, Roles, Habits) Pt is independent with all functional mobility and ADLs/IADLs at baseline.    Existing Precautions/Restrictions abdominal   Left Upper Extremity (Weight-bearing Status) full weight-bearing (FWB)   Right Upper Extremity (Weight-bearing Status) full weight-bearing (FWB)   Left Lower Extremity (Weight-bearing Status) full weight-bearing (FWB)   Right Lower Extremity (Weight-bearing Status) full weight-bearing (FWB)   General Observations and Info Activity: ambulate with assist   Cognitive Status Examination   Orientation Status orientation to person, place and time   Affect/Mental Status (Cognitive) WNL   Follows Commands WNL   Safety Deficit safety precautions awareness   Cognitive Status Comments Pt is AxOx4, will continue to monitor cognition closely during hospital stay.    Visual Perception   Visual Impairment/Limitations WNL   Sensory   Sensory Quick Adds No deficits were identified   Pain Assessment   Patient Currently in Pain No   Integumentary/Edema   Integumentary/Edema no deficits were identifed   Posture   Posture forward head position   Range of Motion Comprehensive   General Range of Motion bilateral upper extremity ROM WNL   Strength Comprehensive (MMT)   General Manual Muscle Testing (MMT) Assessment no strength deficits identified   Coordination   Upper Extremity Coordination No deficits were identified   Gross Motor Coordination No deficits identified    Fine Motor Coordination Not tested    Transfers   Transfer Comments SBA sit<>stand transfers    Balance   Balance Comments No overt LOB noted with ambulation    Instrumental Activities of Daily Living (IADL)   Previous Responsibilities meal prep;housekeeping;laundry;medication management;finances;driving   IADL Comments Pt reports his SO does most of the  grocery shopping, does all other IADLs independently.    Clinical Impression   Criteria for Skilled Therapeutic Interventions Met (OT) yes;skilled treatment is necessary   OT Diagnosis Decreased ADL-I   OT Problem List-Impairments impacting ADL problems related to;activity tolerance impaired;strength;pain;post-surgical precautions;flexibility;mobility   Assessment of Occupational Performance 3-5 Performance Deficits   Identified Performance Deficits bathing, toileting, dressing, home management, community mobility   Planned Therapy Interventions (OT) ADL retraining;IADL retraining;progressive activity/exercise;strengthening   Clinical Decision Making Complexity (OT) low complexity   Therapy Frequency (OT) 6x/week   Predicted Duration of Therapy 1 week   Risk & Benefits of therapy have been explained evaluation/treatment results reviewed;care plan/treatment goals reviewed;risks/benefits reviewed;current/potential barriers reviewed;participants included;participants voiced agreement with care plan;patient   OT Discharge Planning    OT Discharge Recommendation (DC Rec) Home with assist   OT Rationale for DC Rec Anticipate with continued IP therapies that pt will be able to safely discharge to home with assist from SO once medically stable. Assist likely will be needed for heavier IADLs to maintain post-surgical precautions.    OT Brief overview of current status  CGA for ambulation in hallway.    Total Evaluation Time (Minutes)   Total Evaluation Time (Minutes) 4

## 2021-06-28 NOTE — PROGRESS NOTES
Diabetes Consult Daily  Progress Note          Assessment/Plan:     HPI:  Da is a 76 year old male with a history of HTN, CKD III, hx CAD s/p CABG, HLP, TIIDM, obesity, and history of colon cancer s/p lap right hemicolectomy with a new unresectable polyp who was admitted 6/22/21 for recurrent ileocolic bowel resection.     CT planned for today    Assessment:     1) Type II Diabetes Mellitus, suboptimally controlled (A1c 8.8%), with post operative hyperglycemia          Plan:                  - Lantus 16 units daily AM                 - aspart 1:18g CHO coverage with meals and snacks/supplements - please give now that NPO status removed                 -aspart moderate intensity sliding scale TID AC/HS                 -BG monitoring TID AC/HS/0200                 -hypoglycemia protocol                 -carb counting protocol                 -assess for resumption in Metformin/Glipizide once tolerating diet advancement, pending stability in renal function.                  -diabetes education needs are not identified; he recently started Lantus insulin and is comfortable with injections.                  -on discharge, will recommend outpatient follow up with endocrinology service within 2-3 weeks for reassessment.  Request placed.      Plan discussed with patient,  and RN/primary team via this note.                Interval History:     The last 24 hours progress and nursing notes reviewed.  No acute events this interval.    NGT was removed yesterday, diet also advanced to CLD.  BG remained stable  Discussed with Primary team who is ok with IDS signing off.   May have changing requirements as diet/intake changes - don't hesitate call back.        Da with no new or additional concerns this am.  Abdomen remains distended, no nausea or vomiting.  No stool today, belching.   Happy NGT is out.     Recent Labs   Lab 06/28/21  0426 06/28/21  0017 06/27/21  2135 06/27/21 2010 06/27/21  1601  "21  1115 21  0834 21  0834 21  0418 21  0418 21  0642 21  0642 21  2242 21  0701 21  0701 21  0715 21  0715   GLC  --   --   --   --   --   --   --  184*  --  170*  --  252* 182*  --  126*  --  209*   * 139* 151* 146* 162* 167*   < >  --    < >  --    < >  --   --    < >  --    < >  --     < > = values in this interval not displayed.           Nutrition:     Orders Placed This Encounter      Diet      Clear Liquid Diet          PTA Regimen:     BG monitorin-3 times daily  Metformin IR 1000 mg BID WM  Glipizide IR 10mg BID WM  Lantus 20 units daily HS          Review of Systems:   CC:  \"stomach is still bloated, I am bleching a lot\"    Constitutional:   No fever/chills  ENT/Mouth:   No hearing changes, no ear pain, no sore throat, no rhinorrhea, no difficulty swallowing  Eyes:  No eye pain, no discharge, no vision changes  CV:   No CP/SOB, no new edema  Resp:  No cough, no wheezing  GI:   No N/V, + constipation, no diarrhea  :  No dysuria, frequency, or hematuria  Musk:  No new joint swelling/pain, denies back pain  Skin/heme:   No new rashes/bruises/open areas.  No pruritis  Neuro:   No new weakness, no changes to numbness/tingling, no headache  Psych:   No new anxiety, denies depression  Endocrine:  No polyuria or polydipsia         Medications:   Steroid planning:  none       Physical Exam:   BP (!) 155/79 (BP Location: Right arm)   Pulse 86   Temp 98.9  F (37.2  C) (Temporal)   Resp 17   Ht 1.727 m (5' 8\")   Wt 94.6 kg (208 lb 8.9 oz)   SpO2 95%   BMI 31.71 kg/m      General:   NAD, sitting up in chair  HEENT:  NC/AT. MMM, sclera not injected  Lungs:  unremarkable, no new cough, no SOB  ABD:   Distended, obese  Skin:  warm and dry, no obvious lesions/rash  Feet:   CMS intact, PPP  MSK:   moving all extremities  Lymp:   no LE edema  Mental Status:  Alert and oriented x3  Psych:   Cooperative, good eye contact, full affect "          Data:     Lab Results   Component Value Date    A1C 8.8 06/18/2021    A1C 8.9 05/05/2021        Lab Results   Component Value Date    WBC 5.9 06/28/2021     Lab Results   Component Value Date    RBC 2.77 06/28/2021     Lab Results   Component Value Date    HGB 7.3 06/28/2021     Lab Results   Component Value Date    HCT 24.4 06/28/2021     No components found for: MCT  Lab Results   Component Value Date    MCV 88 06/28/2021     Lab Results   Component Value Date    MCH 26.4 06/28/2021     Lab Results   Component Value Date    MCHC 29.9 06/28/2021     Lab Results   Component Value Date    RDW 15.5 06/28/2021     Lab Results   Component Value Date     06/28/2021     Recent Labs   Lab Test 06/28/21  0830 06/27/21  0834    138   POTASSIUM 3.8 3.9   CHLORIDE 106 106   CO2 24 23   ANIONGAP 9 9   * 184*   BUN 22 24   CR 1.04 1.11   BOOGIE 8.2* 8.1*       Liver Function Studies -   Recent Labs   Lab Test 06/18/21  1540   PROTTOTAL 7.4   ALBUMIN 3.7   BILITOTAL 0.6   ALKPHOS 92   AST 20   ALT 43     No results found for: INR    Inpatient Diabetes Service Signing off 6/28/2021  Please call back with any questions.       ARON Kauffman CNP   Inpatient Diabetes Management Service  Pager - [phone]  Friday - Monday 0800 - 1600 hrs  Diabetes Management Team job code: [phone]    I spent a total of 25 minutes bedside and on the inpatient unit managing glycemic care.  Over 50% of my time on the unit was spent counseling the patient and/or coordinating care regarding acute hyperglycemic management.  See note for details.

## 2021-06-29 ENCOUNTER — APPOINTMENT (OUTPATIENT)
Dept: PHYSICAL THERAPY | Facility: CLINIC | Age: 76
DRG: 330 | End: 2021-06-29
Attending: SURGERY
Payer: MEDICARE

## 2021-06-29 LAB
ALP SERPL-CCNC: 88 U/L (ref 40–150)
ALT SERPL W P-5'-P-CCNC: 20 U/L (ref 0–70)
ANION GAP SERPL CALCULATED.3IONS-SCNC: 9 MMOL/L (ref 3–14)
AST SERPL W P-5'-P-CCNC: 12 U/L (ref 0–45)
BILIRUB SERPL-MCNC: 0.4 MG/DL (ref 0.2–1.3)
BUN SERPL-MCNC: 21 MG/DL (ref 7–30)
CALCIUM SERPL-MCNC: 8.3 MG/DL (ref 8.5–10.1)
CHLORIDE SERPL-SCNC: 108 MMOL/L (ref 94–109)
CO2 SERPL-SCNC: 22 MMOL/L (ref 20–32)
CREAT SERPL-MCNC: 1 MG/DL (ref 0.66–1.25)
ERYTHROCYTE [DISTWIDTH] IN BLOOD BY AUTOMATED COUNT: 15.7 % (ref 10–15)
GFR SERPL CREATININE-BSD FRML MDRD: 74 ML/MIN/{1.73_M2}
GLUCOSE BLDC GLUCOMTR-MCNC: 119 MG/DL (ref 70–99)
GLUCOSE BLDC GLUCOMTR-MCNC: 125 MG/DL (ref 70–99)
GLUCOSE BLDC GLUCOMTR-MCNC: 130 MG/DL (ref 70–99)
GLUCOSE BLDC GLUCOMTR-MCNC: 136 MG/DL (ref 70–99)
GLUCOSE BLDC GLUCOMTR-MCNC: 148 MG/DL (ref 70–99)
GLUCOSE SERPL-MCNC: 141 MG/DL (ref 70–99)
HCT VFR BLD AUTO: 24.4 % (ref 40–53)
HGB BLD-MCNC: 7.6 G/DL (ref 13.3–17.7)
LABORATORY COMMENT REPORT: NORMAL
MAGNESIUM SERPL-MCNC: 2.3 MG/DL (ref 1.6–2.3)
MCH RBC QN AUTO: 27 PG (ref 26.5–33)
MCHC RBC AUTO-ENTMCNC: 31.1 G/DL (ref 31.5–36.5)
MCV RBC AUTO: 87 FL (ref 78–100)
PHOSPHATE SERPL-MCNC: 2.8 MG/DL (ref 2.5–4.5)
PLATELET # BLD AUTO: 345 10E9/L (ref 150–450)
POTASSIUM SERPL-SCNC: 3.5 MMOL/L (ref 3.4–5.3)
RBC # BLD AUTO: 2.81 10E12/L (ref 4.4–5.9)
SARS-COV-2 RNA RESP QL NAA+PROBE: NEGATIVE
SODIUM SERPL-SCNC: 138 MMOL/L (ref 133–144)
SPECIMEN SOURCE: NORMAL
TRIGL SERPL-MCNC: 162 MG/DL
WBC # BLD AUTO: 5.8 10E9/L (ref 4–11)

## 2021-06-29 PROCEDURE — 99233 SBSQ HOSP IP/OBS HIGH 50: CPT | Mod: 24 | Performed by: PHYSICIAN ASSISTANT

## 2021-06-29 PROCEDURE — U0005 INFEC AGEN DETEC AMPLI PROBE: HCPCS | Performed by: STUDENT IN AN ORGANIZED HEALTH CARE EDUCATION/TRAINING PROGRAM

## 2021-06-29 PROCEDURE — 80048 BASIC METABOLIC PNL TOTAL CA: CPT | Performed by: SURGERY

## 2021-06-29 PROCEDURE — 84478 ASSAY OF TRIGLYCERIDES: CPT | Performed by: SURGERY

## 2021-06-29 PROCEDURE — 83735 ASSAY OF MAGNESIUM: CPT | Performed by: SURGERY

## 2021-06-29 PROCEDURE — 250N000011 HC RX IP 250 OP 636: Performed by: PHYSICIAN ASSISTANT

## 2021-06-29 PROCEDURE — 36569 INSJ PICC 5 YR+ W/O IMAGING: CPT

## 2021-06-29 PROCEDURE — 120N000002 HC R&B MED SURG/OB UMMC

## 2021-06-29 PROCEDURE — 272N000458 ZZ HC KIT, 5 FR DL BIOFLO OPEN ENDED PICC

## 2021-06-29 PROCEDURE — 36415 COLL VENOUS BLD VENIPUNCTURE: CPT | Performed by: SURGERY

## 2021-06-29 PROCEDURE — 250N000011 HC RX IP 250 OP 636: Performed by: STUDENT IN AN ORGANIZED HEALTH CARE EDUCATION/TRAINING PROGRAM

## 2021-06-29 PROCEDURE — 82247 BILIRUBIN TOTAL: CPT | Performed by: SURGERY

## 2021-06-29 PROCEDURE — 999N001017 HC STATISTIC GLUCOSE BY METER IP

## 2021-06-29 PROCEDURE — 97116 GAIT TRAINING THERAPY: CPT | Mod: GP | Performed by: REHABILITATION PRACTITIONER

## 2021-06-29 PROCEDURE — 84100 ASSAY OF PHOSPHORUS: CPT | Performed by: SURGERY

## 2021-06-29 PROCEDURE — 250N000009 HC RX 250: Performed by: SURGERY

## 2021-06-29 PROCEDURE — U0003 INFECTIOUS AGENT DETECTION BY NUCLEIC ACID (DNA OR RNA); SEVERE ACUTE RESPIRATORY SYNDROME CORONAVIRUS 2 (SARS-COV-2) (CORONAVIRUS DISEASE [COVID-19]), AMPLIFIED PROBE TECHNIQUE, MAKING USE OF HIGH THROUGHPUT TECHNOLOGIES AS DESCRIBED BY CMS-2020-01-R: HCPCS | Performed by: STUDENT IN AN ORGANIZED HEALTH CARE EDUCATION/TRAINING PROGRAM

## 2021-06-29 PROCEDURE — 3E0436Z INTRODUCTION OF NUTRITIONAL SUBSTANCE INTO CENTRAL VEIN, PERCUTANEOUS APPROACH: ICD-10-PCS | Performed by: SURGERY

## 2021-06-29 PROCEDURE — 84450 TRANSFERASE (AST) (SGOT): CPT | Performed by: SURGERY

## 2021-06-29 PROCEDURE — 84075 ASSAY ALKALINE PHOSPHATASE: CPT | Performed by: SURGERY

## 2021-06-29 PROCEDURE — 258N000003 HC RX IP 258 OP 636: Performed by: SURGERY

## 2021-06-29 PROCEDURE — 85027 COMPLETE CBC AUTOMATED: CPT | Performed by: SURGERY

## 2021-06-29 PROCEDURE — 272N000473 HC KIT, VPS RHYTHM STYLET

## 2021-06-29 PROCEDURE — 97530 THERAPEUTIC ACTIVITIES: CPT | Mod: GP | Performed by: REHABILITATION PRACTITIONER

## 2021-06-29 PROCEDURE — 272N000201 ZZ HC ADHESIVE SKIN CLOSURE, DERMABOND

## 2021-06-29 PROCEDURE — C9113 INJ PANTOPRAZOLE SODIUM, VIA: HCPCS | Performed by: STUDENT IN AN ORGANIZED HEALTH CARE EDUCATION/TRAINING PROGRAM

## 2021-06-29 PROCEDURE — 84460 ALANINE AMINO (ALT) (SGPT): CPT | Performed by: SURGERY

## 2021-06-29 RX ORDER — HEPARIN SODIUM,PORCINE 10 UNIT/ML
5-10 VIAL (ML) INTRAVENOUS
Status: DISCONTINUED | OUTPATIENT
Start: 2021-06-29 | End: 2021-07-05 | Stop reason: HOSPADM

## 2021-06-29 RX ORDER — DEXTROSE MONOHYDRATE 100 MG/ML
INJECTION, SOLUTION INTRAVENOUS CONTINUOUS PRN
Status: DISCONTINUED | OUTPATIENT
Start: 2021-06-29 | End: 2021-07-05 | Stop reason: HOSPADM

## 2021-06-29 RX ORDER — HEPARIN SODIUM,PORCINE 10 UNIT/ML
5-10 VIAL (ML) INTRAVENOUS EVERY 24 HOURS
Status: DISCONTINUED | OUTPATIENT
Start: 2021-06-29 | End: 2021-07-05 | Stop reason: HOSPADM

## 2021-06-29 RX ORDER — HEPARIN SODIUM,PORCINE 10 UNIT/ML
2-5 VIAL (ML) INTRAVENOUS
Status: ACTIVE | OUTPATIENT
Start: 2021-06-29 | End: 2021-07-02

## 2021-06-29 RX ORDER — LIDOCAINE 40 MG/G
CREAM TOPICAL
Status: DISCONTINUED | OUTPATIENT
Start: 2021-06-29 | End: 2021-07-05 | Stop reason: HOSPADM

## 2021-06-29 RX ORDER — HYDRALAZINE HYDROCHLORIDE 20 MG/ML
10 INJECTION INTRAMUSCULAR; INTRAVENOUS EVERY 4 HOURS PRN
Status: DISCONTINUED | OUTPATIENT
Start: 2021-06-29 | End: 2021-07-05 | Stop reason: HOSPADM

## 2021-06-29 RX ORDER — LIDOCAINE 40 MG/G
CREAM TOPICAL
Status: DISCONTINUED | OUTPATIENT
Start: 2021-06-29 | End: 2021-06-29

## 2021-06-29 RX ORDER — HEPARIN SODIUM 5000 [USP'U]/.5ML
5000 INJECTION, SOLUTION INTRAVENOUS; SUBCUTANEOUS EVERY 12 HOURS
Status: DISCONTINUED | OUTPATIENT
Start: 2021-06-29 | End: 2021-07-02

## 2021-06-29 RX ADMIN — HEPARIN SODIUM 5000 UNITS: 5000 INJECTION, SOLUTION INTRAVENOUS; SUBCUTANEOUS at 21:30

## 2021-06-29 RX ADMIN — METOCLOPRAMIDE HYDROCHLORIDE 10 MG: 5 INJECTION INTRAMUSCULAR; INTRAVENOUS at 21:00

## 2021-06-29 RX ADMIN — Medication 5 ML: at 12:36

## 2021-06-29 RX ADMIN — METOCLOPRAMIDE HYDROCHLORIDE 10 MG: 5 INJECTION INTRAMUSCULAR; INTRAVENOUS at 08:51

## 2021-06-29 RX ADMIN — METOPROLOL TARTRATE 10 MG: 5 INJECTION INTRAVENOUS at 12:25

## 2021-06-29 RX ADMIN — SODIUM CHLORIDE: 9 INJECTION, SOLUTION INTRAVENOUS at 12:36

## 2021-06-29 RX ADMIN — METOCLOPRAMIDE HYDROCHLORIDE 10 MG: 5 INJECTION INTRAMUSCULAR; INTRAVENOUS at 01:41

## 2021-06-29 RX ADMIN — METOPROLOL TARTRATE 10 MG: 5 INJECTION INTRAVENOUS at 19:25

## 2021-06-29 RX ADMIN — LABETALOL HYDROCHLORIDE 10 MG: 5 INJECTION, SOLUTION INTRAVENOUS at 01:51

## 2021-06-29 RX ADMIN — Medication 5 ML: at 21:25

## 2021-06-29 RX ADMIN — I.V. FAT EMULSION 250 ML: 20 EMULSION INTRAVENOUS at 21:13

## 2021-06-29 RX ADMIN — METOPROLOL TARTRATE 10 MG: 5 INJECTION INTRAVENOUS at 05:26

## 2021-06-29 RX ADMIN — METOPROLOL TARTRATE 10 MG: 5 INJECTION INTRAVENOUS at 23:44

## 2021-06-29 RX ADMIN — METOPROLOL TARTRATE 10 MG: 5 INJECTION INTRAVENOUS at 00:28

## 2021-06-29 RX ADMIN — METOCLOPRAMIDE HYDROCHLORIDE 10 MG: 5 INJECTION INTRAMUSCULAR; INTRAVENOUS at 15:08

## 2021-06-29 RX ADMIN — HEPARIN SODIUM 5000 UNITS: 5000 INJECTION, SOLUTION INTRAVENOUS; SUBCUTANEOUS at 11:30

## 2021-06-29 RX ADMIN — Medication: at 21:10

## 2021-06-29 RX ADMIN — PANTOPRAZOLE SODIUM 40 MG: 40 INJECTION, POWDER, FOR SOLUTION INTRAVENOUS at 11:29

## 2021-06-29 ASSESSMENT — ACTIVITIES OF DAILY LIVING (ADL)
ADLS_ACUITY_SCORE: 18
ADLS_ACUITY_SCORE: 19
ADLS_ACUITY_SCORE: 18
ADLS_ACUITY_SCORE: 18
ADLS_ACUITY_SCORE: 19
ADLS_ACUITY_SCORE: 18

## 2021-06-29 ASSESSMENT — MIFFLIN-ST. JEOR
SCORE: 1686.1
SCORE: 1688.83

## 2021-06-29 NOTE — PROGRESS NOTES
"Colorectal Surgery Progress Note    Subjective: CT abd/pelvis yesterday without acute findings; confirmed ileus. Pt had 400cc emesis yest evening, NGT replaced with 800 out initially. Feels relief after placement. Had one small BM, likely the rectal contrast from scan and not true ROBF. No flatus. Denies pain. Feels depressed about lack of progress postoperatively.     Objective:   BP (!) 155/69 (BP Location: Right arm)   Pulse 85   Temp 98.3  F (36.8  C) (Oral)   Resp 16   Ht 1.727 m (5' 8\")   Wt 94.6 kg (208 lb 8.9 oz)   SpO2 94%   BMI 31.71 kg/m      PE:  Gen: sitting up in chair; flat affect  HEENT: NGT in place with brown output  Cardiac: regular rate  Resp: non-labored breathing on RA  Abd: distended, nontender, no rebound/guarding  Ext: warm and well perfused  Incision: c/d/i    Intake/Output Summary (Last 24 hours) at 6/26/2021 0806  Last data filed at 6/26/2021 0659  Gross per 24 hour   Intake 1230 ml   Output 700 ml   Net 530 ml       A/P: 76M hx MI, s/p CABG, stage 3 renal disease, DM2, HTN, hx lap R hemicolectomy in 2019 for colon cancer (pT2N0; 0/23 LN+) and surveillance colonoscopy revealing recurrent unresectable polyp concerning for cancer recurrence (although path showed tubulovillous adenoma) now s/p transverse colectomy 6/22 c/b ileus 6/25 requiring NG placement; removed 6/27 and subsequently replaced evening of 6/28. CT scan performed 6/28 without acute findings but confirming ileus. Will keep NPO with NGT and IV fluids as we await ROBF. Will place PICC and start TPN for nutritional support.     - Will place PICC and start TPN  - NPO  - NS 75/hr  - IV PPI  - IV metoprolol  - Appreciate endocrine assistance with diabetes management; will notify them of TPN initiation  - Ppx: heparin BID (held for low Hgb)  - Dispo: home in coming days pending ileus resolution and ROBF. 30d Lovenox at discharge    Seen and discussed with fellow and staff.    Sae Hernandez MD  Surgery PGY-1      "

## 2021-06-29 NOTE — PROVIDER NOTIFICATION
Notified MD at 1254 AM regarding order clarification and changes in vital signs.      Spoke with: MD [place] resident     Orders were not obtained.    Comments: Gave scheduled metrolol. Systolic still over 150. PRN order for sytstolic over 150. Check BP in 30 minutes and give if still over 150.

## 2021-06-29 NOTE — DISCHARGE SUMMARY
[facility]  Discharge Summary  Colon and Rectal Surgery     Da Amaya MRN# [id number]   YOB: 1945 Age: 76 year old     Date of Admission:  6/22/2021  Date of Discharge::  07/05/21  Admitting Physician:  Oscar Muller MD  Discharge Physician:  Oscar Muller MD  Primary Care Physician:        Toro Worley          Admission Diagnoses:   Colon cancer (H) [C18.9]  Unresectable colonic polyp    Hypertension  History of tobacco use  Elevated PSA  Stage 3 renal disease  History of MI s/p CABG  Type 2 diabetes  Obesity  History of colon cancer s/p prior laparoscopic right hemicolectomy          Discharge Diagnosis:   Colon cancer (H) [C18.9]  Unresectable colonic polyp  Post-operative ileus  Anemia, multifactorial due to acute blood loss and dilution  Physical deconditioning  Diarrhea    Hypertension  History of tobacco use  Elevated PSA  Stage 3 renal disease  History of MI s/p CABG  Type 2 diabetes  Obesity  History of colon cancer s/p prior laparoscopic right hemicolectomy         Procedures:   6/22/2021:  PROCEDURE:  1. Laparoscopic transverse colectomy  2. Extensive lysis of adhesions, requiring >90 minutes.  3. Takedown of splenic flexure  4. Intraoperative indocyanine green testing  5. Intraoperative colonoscopy         Consultations:   ENDOCRINE DIABETES ADULT IP CONSULT  OCCUPATIONAL THERAPY ADULT IP CONSULT  PHYSICAL THERAPY ADULT IP CONSULT  PHARMACY/NUTRITION TO START AND MANAGE TPN  VASCULAR ACCESS FOR PICC PLACEMENT ADULT IP CONSULT  PHARMACY IP CONSULT  PHARMACY IP CONSULT  PHARMACY IP CONSULT  PHARMACY IP CONSULT  CARE MANAGEMENT / SOCIAL WORK IP CONSULT         Imaging Studies:     Results for orders placed or performed during the hospital encounter of 06/22/21   POC US Guidance Needle Placement    Impression    tap   XR Abdomen Port 1 View    Narrative    Exam: XR ABDOMEN PORT 1 VIEWS, 6/24/2021 11:01 PM    Indication: POD2 s/p  colectomy for recurrent colon cancer now with  worsening abd distension    Comparison: Outside CT 4/28/2021    Findings:   Supine portable views of the abdomen. There are dilated loops of small  bowel about the abdomen. No pneumatosis or portal venous gas. Pelvic  phleboliths. Mild streaky opacities at the lung bases. Sternotomy  wires. No acute osseous abnormality. Degenerative changes of the hips.      Impression    Impression: Air dilated loops of small bowel throughout the abdomen  concerning for small bowel obstruction versus adynamic ileus.    I have personally reviewed the examination and initial interpretation  and I agree with the findings.    VERNA AYERS MD   XR Abdomen Port 1 View    Narrative    EXAM: XR ABDOMEN PORT 1 VIEWS  6/25/2021 7:48 AM      HISTORY: NG tube placement    COMPARISON: Prior day    FINDINGS: Portable AP view the pelvis, upright.    Enteric tube tip and sidehole projects over the gastric fundus.  Persistent but improved dilatation of air-filled loops of small bowel.  Lungs grossly clear.       Impression    IMPRESSION: Enteric tube tip and sidehole project over the gastric  fundus. Persistent but improved small bowel dilatation.    RICO (Shalom ZAVALA MD   XR Abdomen Port 1 View    Narrative    Portable abdomen 1 view    INDICATION: Upright, NG tube position assessment. Postoperative day 4  status post redo ileocolic resection for unresectable polyp, now with  ileus    COMPARISON: 6/25/2021    FINDINGS: No evidence of small bowel distention. There are  degenerative changes in the thoracolumbar spine. Median sternotomy.  Air-filled colonic loops of bowel are noted.      Impression    IMPRESSION: Thoracolumbar spondylosis. Median sternotomy. No gross  distention of bowel gas.    CJ HYDE MD   CT Abdomen Pelvis w Contrast    Narrative    EXAMINATION: CT ABDOMEN PELVIS W CONTRAST, 6/28/2021 11:24 AM    INDICATION: Abdominal distension; POD6 s/p transverse  colectomy for  unresectable polyp (previous right hemicolectomy in 2018); ileus not  resolving c/f obstruction. Additional history from HR: Post right  hemicolectomy (2019) and for colon cancer, now status post transverse  colectomy (6/22/2021) for unresectable polyp with delayed return of  bowel function.    COMPARISON STUDY: Abdominal x-rays 6/24 through 6/26/2021    TECHNIQUE: CT scan of the abdomen and pelvis was performed on  multidetector CT scanner using volumetric acquisition technique and  images were reconstructed in multiple planes with variable thickness  and reviewed on dedicated workstations.     CONTRAST: 127 cc of isovue 370 injected IV with oral contrast    CT scan radiation dose is optimized to minimum requisite dose using  automated dose modulation techniques.    FINDINGS:    Lower thorax: Bibasilar subsegmental atelectasis. Punctate calcified  granuloma. Left pleural calcifications.  No focal consolidations.   Lower median sternotomy wires appear intact.     Liver: No mass. No intrahepatic biliary ductal dilation.    Biliary System: Cholelithiasis without additional findings and  cholecystitis. No intra or extrahepatic biliary dilation.    Pancreas: Fatty atrophy of the pancreas. Pancreatic mass. No  pancreatic duct dilation.    Adrenal glands: No mass or nodules    Spleen: Normal.    Kidneys: Symmetric enhancement of the kidneys. No hydroureter or  foraminal stenosis. Bilateral scattered subcentimeter hypodensities  too small to definitively characterize.  Larger simple fluid  attenuating benign cysts in the bilateral superior poles, measuring up  to 4.4 cm on the right and up to 4.3 cm on the left, not significantly  changed since the chest CT dated 9/3/2019. No suspicious focal renal  masses.    Gastrointestinal tract : Postsurgical changes of right hemicolectomy  and transverse colectomy compatible with the provided surgical  history. Mild diffuse dilation of the small bowel measuring up to  4.5  cm with multiple air-fluid levels. There is no transition point.  Rectal contrast is seen opacifying the colon and distal ileum. Normal  caliber large bowel. No pneumatosis. Sigmoid diverticulosis without  diverticulitis. Rectal tube is in place.    Mesentery/peritoneum/retroperitoneum: No pneumoperitoneum or portal  venous gas. Small volume ascites.    Lymph nodes: No significant lymphadenopathy.    Vasculature: Patent major abdominal vasculature. Aortoiliac  calcifications. Retroaortic left renal vein, normal variant.    Pelvis: Urinary bladder is normal.  Prostate is mildly enlarged with  mild impression on the bladder wall.    Osseous structures: No aggressive or acute osseous lesion.  Moderate  degenerative changes at the lumbar spine.      Soft tissues: Within normal limits.  Fat containing bilateral inguinal  hernias.      Impression    IMPRESSION:   1. Postsurgical changes of right and transverse colectomies. Diffuse  small bowel dilation there is favored to represent adynamic ileus.  Patent ileocolonic anastomosis without contrast extravasation.  2. Small volume abdominal ascites.  3. Cholelithiasis without CT evidence of cholecystitis.  4. Colonic diverticulosis without evidence of diverticulitis.    I have personally reviewed the examination and initial interpretation  and I agree with the findings.    ARGELIA ZENG MD   XR Abdomen Port 1 View    Narrative    Exam: XR ABDOMEN PORT 1 VIEWS, 6/28/2021 9:57 PM    Indication: NGT placement    Comparison: Abdominal radiograph 6/26/2021. CT abdomen and pelvis with  contrast 6/28/2021.    Findings:   AP upright abdominal radiographs. Nasogastric tube tip overlying the  stomach with sidehole just past the GE junction. Gaseous distention of  bowel loops partially visualized as seen on CT same date. Mild gas  within stomach. Degenerative changes with sternotomy wires. No free  air on upright imaging.      Impression    Impression: Gastric tube tip  overlying the stomach with sidehole just  past the GE junction.    VERNA AYERS MD   XR Abdomen Port 1 View    Narrative    Exam: XR ABDOMEN PORT 1 VIEWS, 6/28/2021 10:34 PM    Indication: NG advanced    Comparison: X-ray 6/28/2021    Findings:   Single frontal radiograph of the abdomen. Nasogastric tube tip and  sidehole project over the stomach. Visualized loops of bowel are  unremarkable. No pneumatosis or portal venous gas. No aggressive  osseous lesions. Left lower lobe opacity. Median sternotomy wires in  place.      Impression    Impression:   1. Nasogastric tube tip and sidehole projected over the stomach.  2. Visualized loops of bowel are unremarkable.  3. Left lower lobe atelectasis versus pneumonitis.    I have personally reviewed the examination and initial interpretation  and I agree with the findings.    SIMONE DUNHAM MD          Medications Prior to Admission:     Medications Prior to Admission   Medication Sig Dispense Refill Last Dose     atorvastatin (LIPITOR) 40 MG tablet Take 40 mg by mouth daily    6/20/2021     glipiZIDE (GLUCOTROL) 10 MG tablet Take 10 mg by mouth 2 times daily (before meals)    Past Week at Unknown time     insulin glargine (LANTUS SOLOSTAR) 100 UNIT/ML pen Inject 20 Units Subcutaneous At Bedtime 9 mL 1 6/20/2021     losartan-hydrochlorothiazide (HYZAAR) 100-25 MG tablet Take 1 tablet by mouth every evening    6/22/2021 at Unknown time     metoprolol succinate ER (TOPROL-XL) 200 MG 24 hr tablet Take 200 mg by mouth daily    6/22/2021 at Unknown time     pantoprazole (PROTONIX) 40 MG EC tablet Take 40 mg by mouth daily    6/21/2021 at Unknown time     tamsulosin (FLOMAX) 0.4 MG capsule Take 0.4 mg by mouth daily    6/20/2021 at Unknown time     insulin pen needle (BD MIAN U/F) 32G X 4 MM miscellaneous Use 1 pen needles daily or as directed. 45 each 1 Unknown at Unknown time     [DISCONTINUED] amLODIPine (NORVASC) 10 MG tablet Take 10 mg by mouth daily    Unknown at  "Unknown time     [DISCONTINUED] metFORMIN (GLUCOPHAGE) 1000 MG tablet TAKE ONE TABLET BY MOUTH TWICE DAILY WITH MEALS   6/21/2021 at Unknown time     [DISCONTINUED] metroNIDAZOLE (FLAGYL) 500 MG tablet Take 1 tablet (500 mg) by mouth every 6 hours At 8:00 am, 2:00 pm, 8:00 pm the day prior to your surgery with neomycin and zofran. 3 tablet 0 6/21/2021 at Unknown time     [DISCONTINUED] neomycin (MYCIFRADIN) 500 MG tablet Take 2 tablets (1,000 mg) by mouth every 6 hours At 8:00 am, 2:00 pm, 8:00 pm the day prior to your surgery with flagyl and zofran. 6 tablet 0 6/22/2021 at Unknown time     [DISCONTINUED] ondansetron (ZOFRAN) 4 MG tablet Take 1 tablet (4 mg) by mouth every 6 hours At 8:00 am, 2:00 pm, 8:00 pm the day prior to your surgery with neomycin and flagyl. 3 tablet 0 6/22/2021 at Unknown time     [DISCONTINUED] polyethylene glycol (MIRALAX) 17 g packet Take 238 g by mouth See Admin Instructions Starting at 4 pm night prior to surgery. Refer to \"Getting Ready for Surgery\" instructions. 14 packet 0 6/21/2021 at Unknown time     [DISCONTINUED] spironolactone (ALDACTONE) 25 MG tablet Take 0.5 tablets (12.5 mg) by mouth every other day 30 tablet 1 6/21/2021 at Unknown time              Discharge Medications:     Current Discharge Medication List      START taking these medications    Details   acetaminophen (TYLENOL) 325 MG tablet Take 3 tablets (975 mg) by mouth every 6 hours  Qty:        metoclopramide (REGLAN) 10 MG tablet Take 1 tablet (10 mg) by mouth 4 times daily (before meals and nightly)  Qty: 120 tablet, Refills: 0    Associated Diagnoses: Type 2 diabetes mellitus with stage 3a chronic kidney disease, unspecified whether long term insulin use (H)      psyllium (METAMUCIL) WAFR Take 2 Wafers by mouth daily for 10 doses  Qty: 10 packet, Refills: 0    Associated Diagnoses: Loose stools         CONTINUE these medications which have CHANGED    Details   amLODIPine (NORVASC) 10 MG tablet Take 1 tablet (10 mg) " by mouth daily  Qty: 30 tablet, Refills: 0    Associated Diagnoses: Benign essential hypertension      metFORMIN (GLUCOPHAGE) 1000 MG tablet TAKE ONE TABLET BY MOUTH TWICE DAILY WITH MEALS  Qty: 60 tablet, Refills: 0    Associated Diagnoses: Type 2 diabetes mellitus with stage 3a chronic kidney disease, unspecified whether long term insulin use (H)         CONTINUE these medications which have NOT CHANGED    Details   atorvastatin (LIPITOR) 40 MG tablet Take 40 mg by mouth daily       glipiZIDE (GLUCOTROL) 10 MG tablet Take 10 mg by mouth 2 times daily (before meals)       insulin glargine (LANTUS SOLOSTAR) 100 UNIT/ML pen Inject 20 Units Subcutaneous At Bedtime  Qty: 9 mL, Refills: 1    Comments: If Lantus is not covered by insurance, may substitute Basaglar at same dose and frequency.    Associated Diagnoses: Type 2 diabetes mellitus with hyperglycemia, with long-term current use of insulin (H)      losartan-hydrochlorothiazide (HYZAAR) 100-25 MG tablet Take 1 tablet by mouth every evening       metoprolol succinate ER (TOPROL-XL) 200 MG 24 hr tablet Take 200 mg by mouth daily       pantoprazole (PROTONIX) 40 MG EC tablet Take 40 mg by mouth daily       tamsulosin (FLOMAX) 0.4 MG capsule Take 0.4 mg by mouth daily       insulin pen needle (BD MIAN U/F) 32G X 4 MM miscellaneous Use 1 pen needles daily or as directed.  Qty: 45 each, Refills: 1    Associated Diagnoses: Type 2 diabetes mellitus with hyperglycemia, with long-term current use of insulin (H)         STOP taking these medications       metroNIDAZOLE (FLAGYL) 500 MG tablet Comments:   Reason for Stopping:         neomycin (MYCIFRADIN) 500 MG tablet Comments:   Reason for Stopping:         ondansetron (ZOFRAN) 4 MG tablet Comments:   Reason for Stopping:         polyethylene glycol (MIRALAX) 17 g packet Comments:   Reason for Stopping:         spironolactone (ALDACTONE) 25 MG tablet Comments:   Reason for Stopping:                     Brief History of  Illness:   76 year old male with PMH of MI s/p CABG, HTN, DM2 poorly controlled, prior tobacco use, stage 3 renal disease, obesity, history of prior colon cancer s/p prior laparoscopic right hemicolectomy who was found to have an unresectable colonic polyp in the transverse colon.  Now s/p Laparoscopic transverse colectomy, extensive lysis of adhesions (greater than 90 minutes), take down of splenic flexure, intra-op indocyanine green testing, intra-operative colonoscopy on 6/22/2021.  Post-operative course complicated by ileus.             Hospital Course:   Post-operatively pt was gently fluid resuscitated.  Pt had a mildly elevated creatinine post-operatively that normalized relatively quickly.  Haynes was removed and pt was able to void.  On POD# 3 pt developed bloating, nausea that improved with NGT placement.  Pt subsequently had decreased NGT outputs along with passage of stool and gas.  NGT was removed on POD# 5.  CT scan was obtained on POD#6 due to continued lack of bowel function.  CT showed diffuse small bowel dilatation likely adynamic ileus.  No intra-abd abscess/extravasation of contrast.  Pt subsequently passed some bloody stool likely due to contrast from the CT scan but then also vomited.  NGT was replaced on POD#7.  Due to prolonged lack of meaningful bowel function, PICC was placed and TPN was initiated.  Pt subsequently had return of bowel function with diarrhea.  NGT was removed on POD#9.  TPN was weaned off.  Pt was able to tolerate small amounts of a low residue diet.      Endocrinology was consulted for post-operative diabetes management.  Pt should follow up with PCP in 1 week to reassess his diabetic control in the post-operative setting.      Pt was seen by PT/OT and deemed appropriate for discharge home with assist.  Pt was taught how to self-inject post-operative prophylactic lovenox for which he is to do for a total of 30 days. Post-operative pain was controlled with scheduled tylenol.   "Pt declined a prescription for oxycodone.  Pt did request an Rx for metformin and amlodipine as he was running low on these medications.  Pt declined an increase in metoprolol dose as he preferred to follow up with his PCP closer to home.     Patient is to follow up in the Colon and Rectal Surgery Clinic in 2-3 week with Radha Barnes NP and then with Dr. Muller in 2-3 weeks after.          Day of Discharge Physical Exam:   Blood pressure 137/60, pulse 91, temperature 97.7  F (36.5  C), temperature source Temporal, resp. rate 16, height 1.727 m (5' 8\"), weight 97.2 kg (214 lb 3.2 oz), SpO2 93 %.    Gen: AAOx3, NAD  Pulm: Non-labored breathing  Abd: Soft, appropriately tender, no guarding/rebound   Incision C/D/I   Ext:  Warm and well-perfused         Final Pathology Result:   Patient Name: ANJALI REAL   MR#: [id number]   Specimen #: [id number]   Collected: 6/22/2021   Received: 6/22/2021   Reported: 6/25/2021 15:18   Ordering Phy(s): SANDRA MULLER     For improved result formatting, select 'View Enhanced Report Format' under    Linked Documents section.     SPECIMEN(S):   Ileocolic anastomosis, transverse colon and omentum     FINAL DIAGNOSIS:   Ileocolic Anastomosis, Transverse Colon and Omentum; Resection:   Tubular adenoma (size 2.7 x 1.5 x 0.5 cm); negative for high grade   dysplasia:    -Proximal and distal margins uninvolved by adenoma or other abnormality    -Omental tissue with no significant histologic are unremarkable    -Foreign body giant cell reaction (tattoo ink) identified    -Nine reactive lymph nodes, negative for malignancy     I have personally reviewed all specimens and/or slides, including the   listed special stains, and used them   with my medical judgement to determine or confirm the final diagnosis.     Electronically signed out by:     Radha Miguel M.D., [facility], [facility]          Discharge Instructions and Follow-Up:     Discharge Procedure Orders   Reason for your " hospital stay   Order Comments: 6/22/2021:  PROCEDURE:  1. Laparoscopic transverse colectomy  2. Extensive lysis of adhesions, requiring >90 minutes.  3. Takedown of splenic flexure  4. Intraoperative indocyanine green testing  5. Intraoperative colonoscopy     Activity   Order Comments: Your activity upon discharge:   ACTIVITY  -No lifting, pushing, pulling greater than 10 lbs and no strenuous exercise for 6 weeks   -No driving while on narcotic analgesics (i.e. Percocet, oxycodone, Vicodin)  -No driving until you are able to fully twist to both sides or slam on brakes quickly and without any pain  -We encourage walking at least 4-5 times per day     Order Specific Question Answer Comments   Is discharge order? Yes      Wound care and dressings   Order Comments: Instructions to care for your wound at home:   Keep abdominal incisions clean and dry. Can cover with gauze and change daily as needed if irritated by clothing.     Follow Up ([facility])   Order Comments: Please schedule diabetes follow up with Dr. Gregg or any available diabetes TAMEKA within 1-2 weeks following discharge.     Appointments on [place] and/or [facility] (with [facility] or [facility] provider or service). Call [phone] if you haven't heard regarding these appointments within 7 days of discharge.     Adult [facility] Follow-up and recommended labs and tests   Order Comments: WOUND CARE  -Inspect your wounds daily for signs of infection (increased redness, drainage, pain)  -Keep your wound clean and dry  -You may shower, but do not soak in tub or pool    NOTIFY  Please contact Shyanne Singh RN or Alia TSAI at [phone] for problems after discharge such as:  -Temperature > 101F, chills, rigors, dizziness  -Redness around or purulent drainage from wound  -Inability to tolerate diet, nausea or vomiting  -You stop passing gas, develop significant bloating, abdominal pain  -Have blood in stools/vomit  -Have severe diarrhea/constipation  -Any other  questions or concerns.  - At nights (after 4:30pm), on weekends, or if urgent, call [phone] and ask the  to speak with the on-call Colorectal Surgery resident or fellow      Medication Instructions  Some of your medications may have changed. Please take only prescribed and resumed medications     FOLLOW-UP  1.  You will need to follow-up with Radha Barnes NP in the Colon and Rectal Surgery clinic in 2-3 week(s) and then with [facility] Staff: Dr. Oscar Muller in 2-3 weeks after.  Please contact our clinic scheduler (phone # [phone]) if you have not heard from our clinic in 3 business days afer discharge to schedule a follow-up appointment.     2.  Please follow up with your primary care provider in 1 week after discharge to check your blood pressure and your glucose levels during your post-operative recovery period and have your medications adjusted as appropriate to your recovery.     Appointments on [place] and/or [facility] (with [facility] or [facility] provider or service). Call [phone] if you haven't heard regarding these appointments within 7 days of discharge.     Diet   Order Comments: Follow this diet upon discharge:   DIET  -Low Residue Diet for at least 4-6 weeks unless cleared by Colorectal surgery.  No raw vegetables, fruit skins, fibrous foods that require a lot of chewing, nuts, seeds, corn, popcorn.   -We recommend eating slowly, chewing thoroughly, eating small frequent meals throughout the day  -Stay well hydrated.     Order Specific Question Answer Comments   Is discharge order? Yes             Home Health Care:     Not needed           Discharge Disposition:     Discharged to home      Condition at discharge: Stable

## 2021-06-29 NOTE — PROGRESS NOTES
IP Diabetes Management  Daily Note           Assessment and Plan:   HPI: Da is a 76 year old male with a history of HTN, CKD III, hx CAD s/p CABG, HLP, TIIDM, obesity, and history of colon cancer s/p lap right hemicolectomy with a new unresectable polyp who was admitted 6/22/21 for recurrent ileocolic bowel resection. Post op course has been complicated by recurrent ileus, requiring NGT placement and now initiating TPN.      Assessment:   1) Type II Diabetes Mellitus, suboptimally controlled (A1c 8.8%), with post operative hyperglycemia    2) Anticipated TPN-induced hyperglycemia     Plan:    -continue Lantus 16 units daily (this may be continued despite NPO status)   -add regular insulin to TPN: 0.10 units per gram dextrose (16 units for 160g Dextrose)- to start with bag tonight    -discontinued aspart 1:18g CHO coverage with meals and snacks/supplements- is NPO   -continue aspart moderate intensity sliding scale TID, changed threshold for HS to >140 and added 0200 correction, on continuous TPN.   -BG monitoring TID AC, HS, 0200   -when tolerating diet and is more medically stable, will assess for resumption in Metformin and Glipizide.   -hypoglycemia protocol   -carb counting protocol                 -diabetes education needs are not identified; he recently started Lantus insulin and is comfortable with injections.                  -on discharge, will recommend outpatient follow up with [facility] within 2-3 weeks for reassessment.    Plan discussed with and primary team via text page.       Interval History and Assessment: interval glucose trend reviewed:   IDS planned to sign off, called back this AM with plans to start continuous TPN, thus will resume/continue to follow.     BG are remaining reasonably stable. NGT removed 6/27 and diet advanced. Recurring nausea, vomiting, abd distension. CT with recurrent/persistent ileus, NGT replaced,. NPO and starting TPN.     Will add insulin to TPN  bag that is beginning tonight. 160g dextrose. Goal will be 260g dextrose.     Current nutritional intake and type: Orders Placed This Encounter      Diet      NPO for Medical/Clinical Reasons Except for: Ice Chips, Meds  continuous TPN: 160 g dextrose to start, with goal of 260g (advancing by 50g each day if lytes are stable).     PTA Diabetes Regimen:   BG monitorin-3 times daily  Metformin IR 1000 mg BID WM  Glipizide IR 10mg BID WM  Lantus 20 units daily HS    Discharge Planning: TBD           Diabetes History:   Type of Diabetes: Type 2 Diabetes Mellitus, TPN/Enteral Feeding Induced Hyperglycemia  Lab Results   Component Value Date    A1C 8.8 2021    A1C 8.9 2021              Review of Systems:     The Review of Systems is negative other than noted in the Interval History.           Medications:     Current Facility-Administered Medications   Medication     [Held by provider] acetaminophen (TYLENOL) tablet 975 mg     [Held by provider] atorvastatin (LIPITOR) tablet 40 mg     benzocaine 20% (HURRICAINE/TOPEX) 20 % spray 0.5-1 mL     dextrose 10% infusion     glucose gel 15-30 g    Or     dextrose 50 % injection 25-50 mL    Or     glucagon injection 1 mg     [Held by provider] gabapentin (NEURONTIN) capsule 300 mg     heparin ANTICOAGULANT injection 5,000 Units     heparin lock flush 10 UNIT/ML injection 2-5 mL     heparin lock flush 10 UNIT/ML injection 5-10 mL     heparin lock flush 10 UNIT/ML injection 5-10 mL     hydrALAZINE (APRESOLINE) injection 10 mg     HYDROmorphone (DILAUDID) injection 0.2 mg    Or     HYDROmorphone (DILAUDID) injection 0.4 mg     insulin aspart (NovoLOG) injection (RAPID ACTING)     insulin aspart (NovoLOG) injection (RAPID ACTING)     insulin glargine (LANTUS PEN) injection 16 Units     lidocaine (LMX4) cream     lidocaine (LMX4) cream     lidocaine 1 % 0.1-1 mL     lidocaine 1 % 0.1-1 mL     lipids (INTRALIPID) 20 % infusion 250 mL     melatonin tablet 5 mg      "metoclopramide (REGLAN) injection 10 mg     metoprolol (LOPRESSOR) injection 10 mg     [Held by provider] metoprolol succinate ER (TOPROL-XL) 24 hr tablet 150 mg     naloxone (NARCAN) injection 0.2 mg    Or     naloxone (NARCAN) injection 0.4 mg    Or     naloxone (NARCAN) injection 0.2 mg    Or     naloxone (NARCAN) injection 0.4 mg     ondansetron (ZOFRAN-ODT) ODT tab 4 mg    Or     ondansetron (ZOFRAN) injection 4 mg     [Held by provider] oxyCODONE (ROXICODONE) tablet 5 mg     pantoprazole (PROTONIX) IV push injection 40 mg     parenteral nutrition - ADULT compounded formula     sodium chloride (PF) 0.9% PF flush 10-20 mL     sodium chloride (PF) 0.9% PF flush 3 mL     sodium chloride (PF) 0.9% PF flush 3 mL     sodium chloride (PF) 0.9% PF flush 5-50 mL     sodium chloride 0.9% infusion     [Held by provider] tamsulosin (FLOMAX) capsule 0.4 mg            Physical Exam:    BP (!) 169/84 (BP Location: Right arm)   Pulse 95   Temp 98.3  F (36.8  C) (Oral)   Resp 16   Ht 1.727 m (5' 8\")   Wt 94.6 kg (208 lb 8.9 oz)   SpO2 97%   BMI 31.71 kg/m    General: pleasant, in no distress, sleeping sitting up in chair, did not wake.   HEENT: normocephalic, atraumatic. Oral mucous membranes moist. NGT placed.   Lungs: unlabored respiration  ABD: appears mildly distended.   Skin: dry, no obvious lesions  Lymp:  no LE edema   Mental status:  sleeping, did not assess  Psych:  sleeping, did not assess            Data:     Recent Labs   Lab 06/29/21  1158 06/29/21  0722 06/29/21  0212 06/28/21  2236 06/28/21  1809 06/28/21  1207 06/28/21  1157 06/28/21  0830 06/28/21  0830 06/27/21  0834 06/27/21  0834 06/26/21  0418 06/26/21  0418 06/25/21  0642 06/25/21  0642 06/24/21  2242   GLC  --  141*  --   --   --   --   --   --  166*  --  184*  --  170*  --  252* 182*   *  --  148* 159* 148* 167* 159*   < >  --    < >  --    < >  --    < >  --   --     < > = values in this interval not displayed.     Lab Results   Component " Value Date    WBC 5.8 06/29/2021    WBC 5.9 06/28/2021    WBC 11.0 06/26/2021    HGB 7.6 (L) 06/29/2021    HGB 7.3 (L) 06/28/2021    HGB 8.5 (L) 06/26/2021    HCT 24.4 (L) 06/29/2021    HCT 24.4 (L) 06/28/2021    HCT 26.8 (L) 06/26/2021    MCV 87 06/29/2021    MCV 88 06/28/2021    MCV 85 06/26/2021     06/29/2021     06/28/2021     06/26/2021     Lab Results   Component Value Date     06/29/2021     06/28/2021     06/27/2021    POTASSIUM 3.5 06/29/2021    POTASSIUM 3.8 06/28/2021    POTASSIUM 3.9 06/27/2021    CHLORIDE 108 06/29/2021    CHLORIDE 106 06/28/2021    CHLORIDE 106 06/27/2021    CO2 22 06/29/2021    CO2 24 06/28/2021    CO2 23 06/27/2021     (H) 06/29/2021     (H) 06/28/2021     (H) 06/27/2021     Lab Results   Component Value Date    BUN 21 06/29/2021    BUN 22 06/28/2021    BUN 24 06/27/2021     No results found for: TSH  Lab Results   Component Value Date    AST 12 06/29/2021    AST 20 06/18/2021    AST 18 06/02/2021    ALT 20 06/29/2021    ALT 43 06/18/2021    ALT 43 06/02/2021    ALKPHOS 88 06/29/2021    ALKPHOS 92 06/18/2021    ALKPHOS 105 06/02/2021     35 minutes spent on the date of the encounter doing chart review, history and exam, documentation and further activities per the note      Over 50% of my time on the unit was spent counseling the patient and/or coordinating care regarding acute hyperglycemia management.  See note for details.    To contact Endocrine Diabetes service:   From 8AM-4PM: page inpatient diabetes provider that is following the patient  For questions or updates from 4PM-8AM: page the diabetes job code for on call fellow: [phone]    Isabel Mckeon PA-C  Inpatient Diabetes Management Service  Pager [phone]

## 2021-06-29 NOTE — PROCEDURES
[facility]    Double Lumen PICC Placement    Date/Time: 6/29/2021 11:27 AM  Performed by: Mimi Saini RN  Authorized by: Colleen Dodson PA-C   Indications: vascular access    UNIVERSAL PROTOCOL   Site Marked: Yes  Prior Images Obtained and Reviewed:  Yes  Required items: Required blood products, implants, devices and special equipment available    Patient identity confirmed:  Verbally with patient and arm band  NA - No sedation, light sedation, or local anesthesia  Confirmation Checklist:  Patient's identity using two indicators, relevant allergies, procedure was appropriate and matched the consent or emergent situation and correct equipment/implants were available  Time out: Immediately prior to the procedure a time out was called    Universal Protocol: the Joint Commission Universal Protocol was followed    Preparation: Patient was prepped and draped in usual sterile fashion           ANESTHESIA    Anesthesia: Local infiltration  Local Anesthetic:  Lidocaine 1% without epinephrine  Anesthetic Total (mL):  3.5      SEDATION    Patient Sedated: No        Preparation: skin prepped with ChloraPrep  Skin prep agent: skin prep agent completely dried prior to procedure  Sterile barriers: maximum sterile barriers were used: cap, mask, sterile gown, sterile gloves, and large sterile sheet  Hand hygiene: hand hygiene performed prior to central venous catheter insertion  Type of line used: PICC and Power PICC  Catheter type: double lumen  Lumen type: non-valved  Catheter size: 5 Fr  Brand: Bard  Lot number: [id number]  Placement method: venipuncture, MST, ultrasound and tip confirmation system  Number of attempts: 1  Successful placement: yes  Orientation: right  Location: basilic vein (0.42 cm vein diameter)  Arm circumference: adults 10 cm  Extremity circumference: 31  Visible catheter length: 1  Total catheter length: 39  Dressing and securement: blood  cleaned with CHG, chlorhexidine disc applied, statlock, site cleaned, sterile dressing applied and glue  Post procedure assessment: free fluid flow and blood return through all ports  PROCEDURE   Patient Tolerance:  Patient tolerated the procedure well with no immediate complications  Describe Procedure: PICC was confirmed by [employer] 3cg tip confirmation tech. PICC was ready to use.

## 2021-06-29 NOTE — PROGRESS NOTES
CLINICAL NUTRITION SERVICES - ASSESSMENT NOTE     Nutrition Prescription    RECOMMENDATIONS FOR MDs/PROVIDERS TO ORDER:  1. Adjust IV fluids when TPN begins tonight.  TPN will run @ 75 mL/hr  2. Adjustments to insulin and BG checks per Endo/provider    Malnutrition Status:    Non-severe malnutrition in the context of acute illness    Recommendations already ordered by Registered Dietitian (RD):  1. Once central line in place and if K+/Mg++ >/= nrml and phos >1.9:  --Use dosing weight 76 kg  --Begin CPN, goal volume 1800 ml/day with initial 160 g Dex daily (GIR 1.5), 110 g AA daily, and 250 ml 20% IV lipids daily    --Advance PN dex by 50 g every 1-2 days if K+/Mg++ >/= nrml and phos >1.9 and BGs stable (per Pharm D/RD discretion) to initial goal of 260 g Dex to increase provisions to 1824 kcals (24 kcal/kg/day), 1.4 g PRO/kg/day, GIR 2.4 with 27% kcals from Fat.    2. Add-ons: Alk Phos, ALT, AST, Tbili, and TG    Future/Additional Recommendations:  Monitor labs, weights vs need to adjust PN provisions. Once stable at goal dextrose consider need to cycle TPN if appropriate/indicated     REASON FOR ASSESSMENT  Da Amaya is a/an 76 year old male assessed by the dietitian for Pharmacy/Nutrition to Start and Manage PN (Line has been ordered, but not yet inserted)    NUTRITION HISTORY  Patient reports eating well on a regular diet PTA, denies any known food allergies.      Per chart, PMH includes CABG, stage 3 renal disease, DM2, HTN, hx lap R hemicolectomy in 2019 for colon cancerand surveillance colonoscopy revealing recurrent unresectable polyp concerning for cancer recurrence now s/p transverse colectomy 6/22.  CT scan performed 6/28 without acute findings but confirming ileus     Endocrine is following, have provided recs to pharmacist for insulin to add to TPN based on above TPN dextrose recs    CURRENT NUTRITION ORDERS  Diet: NPO    Intake/Tolerance: NPO since last night, Currently with NG to LIS.  Pt was on  "clear liquids 6/27-6/28, NPO 6/24-6/27, low fiber 6/24, and clear lqiuids 6/22-6/24.     LABS  Labs reviewed  - K+, Mg++ WNL; phos ordered  - BGs 140s-160s the past few days    MEDICATIONS  Medications reviewed  - Medium sliding scale insulin TID before meals + at bedtime  - CHO counting (1 unit per 18 g CHO TID with meals)  - Lantus  - Reglan q6h (ordered to stop tomorrow)  - IVF @ 75 mL/hr    ANTHROPOMETRICS  Height: 172.7 cm (5' 8\")  Most Recent Weight: 94.6 kg (208 lb 8.9 oz)    IBW: 70 kg    BMI: Obesity Grade I BMI 30-34.9  Weight History: 2 lb wt loss from 6/2-6/22 (not clinically significant), however no weight recorded since 6/22 (ordered today)  Wt Readings from Last 10 Encounters:   06/22/21 94.6 kg (208 lb 8.9 oz)   06/18/21 100.2 kg (221 lb)   06/18/21 95.3 kg (210 lb)   06/02/21 95.3 kg (210 lb 1.6 oz)      Dosing Weight: 76 kg (adjusted based on actual wt 94.6 kg and IBW)    ASSESSED NUTRITION NEEDS  Estimated Energy Needs: 7751-6745 kcals/day (20 - 25 kcals/kg)  Justification: Obese/maintenance on TPN  Estimated Protein Needs:  grams protein/day (1.2 - 1.5 grams of pro/kg)  Justification: Post-op, pending ongoing renal function  Estimated Fluid Needs: 0550-2564 mL/day (25 - 30 mL/kg)   Justification: Maintenance, or other per provider pending fluid status    PHYSICAL FINDINGS  See malnutrition section below.    MALNUTRITION  % Intake: </= 50% for >/= 5 days (severe)  % Weight Loss: Weight loss does not meet criteria  Subcutaneous Fat Loss: None observed  Muscle Loss: None observed  Fluid Accumulation/Edema: mild per flowsheets  Malnutrition Diagnosis: Non-severe malnutrition in the context of acute illness    NUTRITION DIAGNOSIS  Inadequate oral intake related to slow diet advancement 2/2 post-op ileus as evidenced by mostly NPO the past 7 days since admit and with NG to LIS      INTERVENTIONS  Implementation  Nutrition Education: Provided education on role of RD and nutrition POC "   Collaboration with other providers - discussed pt in interdisciplinary team rounds. Paged primary team with above IV fluid recs  Parenteral Nutrition/IV Fluids - sent change order request, see above    Goals  Nutrition support to reach goal within 2-3 days.     Monitoring/Evaluation  Progress toward goals will be monitored and evaluated per protocol.     Holly Panchal RD, LD  Pager: [phone]  Units covered: 7C (all beds) and 5A (beds 5201 through 5211-2)

## 2021-06-29 NOTE — PLAN OF CARE
Remains hypertensive; decreased after scheduled metoprolol.  NGT with 500cc this shift.  Abdomen distended, slightly softer than yesterday.  Denies flatus. Reports feeling much better with NGT in place. PICC placed, tos start TPN tonight.  Ambulated x 2 and showered.  Up in the chair all day. Lungs CTA, diminished in bases.  Needs reminder to use IS.

## 2021-06-29 NOTE — PLAN OF CARE
Hypertensive overnight. Scheduled metoprolol added Q6. PRN labetalol also needed x1. No PRN pain meds needed. NG tube to LIS. 800 ml out this shift. NPO. Patient sleeping in the chair. Dependent edema in lower extremities, patient encouraged to get up and walk today. Will continue with plan of care.

## 2021-06-29 NOTE — PLAN OF CARE
/76, IV Labetalol x1, 149/65 at recheck. All other VSS. Pt reports abdominal pain, controlled with Tylenol x1, Gabapentin x1, & ambulation. Incision with dermabond ALEC. Pt had 400 ml emesis x1, MD notified. NGT reinserted, had to be advanced x1, final xray done, MD verified ok to use. BS faint & hypoactive, pt reports belching, but not passing gas. x1 bloody-loose stool, MD aware. Pt up SBA, amb in kelly x1, voiding adequately.

## 2021-06-30 ENCOUNTER — APPOINTMENT (OUTPATIENT)
Dept: OCCUPATIONAL THERAPY | Facility: CLINIC | Age: 76
DRG: 330 | End: 2021-06-30
Attending: SURGERY
Payer: MEDICARE

## 2021-06-30 ENCOUNTER — APPOINTMENT (OUTPATIENT)
Dept: PHYSICAL THERAPY | Facility: CLINIC | Age: 76
DRG: 330 | End: 2021-06-30
Attending: SURGERY
Payer: MEDICARE

## 2021-06-30 LAB
ALBUMIN SERPL-MCNC: 2.6 G/DL (ref 3.4–5)
ALP SERPL-CCNC: 86 U/L (ref 40–150)
ALT SERPL W P-5'-P-CCNC: 19 U/L (ref 0–70)
ANION GAP SERPL CALCULATED.3IONS-SCNC: 10 MMOL/L (ref 3–14)
AST SERPL W P-5'-P-CCNC: 12 U/L (ref 0–45)
BILIRUB DIRECT SERPL-MCNC: 0.1 MG/DL (ref 0–0.2)
BILIRUB SERPL-MCNC: 0.4 MG/DL (ref 0.2–1.3)
BUN SERPL-MCNC: 24 MG/DL (ref 7–30)
CALCIUM SERPL-MCNC: 8 MG/DL (ref 8.5–10.1)
CHLORIDE SERPL-SCNC: 107 MMOL/L (ref 94–109)
CO2 SERPL-SCNC: 23 MMOL/L (ref 20–32)
CREAT SERPL-MCNC: 1.06 MG/DL (ref 0.66–1.25)
GFR SERPL CREATININE-BSD FRML MDRD: 69 ML/MIN/{1.73_M2}
GLUCOSE BLDC GLUCOMTR-MCNC: 162 MG/DL (ref 70–99)
GLUCOSE BLDC GLUCOMTR-MCNC: 179 MG/DL (ref 70–99)
GLUCOSE BLDC GLUCOMTR-MCNC: 184 MG/DL (ref 70–99)
GLUCOSE BLDC GLUCOMTR-MCNC: 193 MG/DL (ref 70–99)
GLUCOSE SERPL-MCNC: 188 MG/DL (ref 70–99)
INR PPP: 1.12 (ref 0.86–1.14)
MAGNESIUM SERPL-MCNC: 2.1 MG/DL (ref 1.6–2.3)
PHOSPHATE SERPL-MCNC: 2.1 MG/DL (ref 2.5–4.5)
POTASSIUM SERPL-SCNC: 3 MMOL/L (ref 3.4–5.3)
POTASSIUM SERPL-SCNC: 3.2 MMOL/L (ref 3.4–5.3)
PREALB SERPL IA-MCNC: 10 MG/DL (ref 15–45)
PROT SERPL-MCNC: 6.3 G/DL (ref 6.8–8.8)
SODIUM SERPL-SCNC: 139 MMOL/L (ref 133–144)

## 2021-06-30 PROCEDURE — 84100 ASSAY OF PHOSPHORUS: CPT | Performed by: SURGERY

## 2021-06-30 PROCEDURE — 82248 BILIRUBIN DIRECT: CPT | Performed by: SURGERY

## 2021-06-30 PROCEDURE — 80053 COMPREHEN METABOLIC PANEL: CPT | Performed by: SURGERY

## 2021-06-30 PROCEDURE — 250N000009 HC RX 250: Performed by: SURGERY

## 2021-06-30 PROCEDURE — 99233 SBSQ HOSP IP/OBS HIGH 50: CPT | Mod: 24 | Performed by: PHYSICIAN ASSISTANT

## 2021-06-30 PROCEDURE — 250N000011 HC RX IP 250 OP 636: Performed by: PHYSICIAN ASSISTANT

## 2021-06-30 PROCEDURE — 250N000013 HC RX MED GY IP 250 OP 250 PS 637

## 2021-06-30 PROCEDURE — 85610 PROTHROMBIN TIME: CPT | Performed by: SURGERY

## 2021-06-30 PROCEDURE — 97530 THERAPEUTIC ACTIVITIES: CPT | Mod: GO

## 2021-06-30 PROCEDURE — C9113 INJ PANTOPRAZOLE SODIUM, VIA: HCPCS | Performed by: STUDENT IN AN ORGANIZED HEALTH CARE EDUCATION/TRAINING PROGRAM

## 2021-06-30 PROCEDURE — 97530 THERAPEUTIC ACTIVITIES: CPT | Mod: GP | Performed by: REHABILITATION PRACTITIONER

## 2021-06-30 PROCEDURE — 250N000011 HC RX IP 250 OP 636: Performed by: SURGERY

## 2021-06-30 PROCEDURE — 83735 ASSAY OF MAGNESIUM: CPT | Performed by: SURGERY

## 2021-06-30 PROCEDURE — 36592 COLLECT BLOOD FROM PICC: CPT | Performed by: SURGERY

## 2021-06-30 PROCEDURE — 97535 SELF CARE MNGMENT TRAINING: CPT | Mod: GO

## 2021-06-30 PROCEDURE — 999N001017 HC STATISTIC GLUCOSE BY METER IP

## 2021-06-30 PROCEDURE — 120N000002 HC R&B MED SURG/OB UMMC

## 2021-06-30 PROCEDURE — 84134 ASSAY OF PREALBUMIN: CPT | Performed by: SURGERY

## 2021-06-30 PROCEDURE — 84132 ASSAY OF SERUM POTASSIUM: CPT | Performed by: SURGERY

## 2021-06-30 PROCEDURE — 97116 GAIT TRAINING THERAPY: CPT | Mod: GP | Performed by: REHABILITATION PRACTITIONER

## 2021-06-30 PROCEDURE — 250N000011 HC RX IP 250 OP 636: Performed by: STUDENT IN AN ORGANIZED HEALTH CARE EDUCATION/TRAINING PROGRAM

## 2021-06-30 RX ORDER — AMLODIPINE BESYLATE 10 MG/1
10 TABLET ORAL DAILY
Status: DISCONTINUED | OUTPATIENT
Start: 2021-06-30 | End: 2021-07-05 | Stop reason: HOSPADM

## 2021-06-30 RX ORDER — AMLODIPINE BESYLATE 10 MG/1
10 TABLET ORAL DAILY
Status: DISCONTINUED | OUTPATIENT
Start: 2021-06-30 | End: 2021-06-30

## 2021-06-30 RX ORDER — POTASSIUM CHLORIDE 29.8 MG/ML
20 INJECTION INTRAVENOUS
Status: COMPLETED | OUTPATIENT
Start: 2021-06-30 | End: 2021-07-01

## 2021-06-30 RX ORDER — POTASSIUM CHLORIDE 7.45 MG/ML
10 INJECTION INTRAVENOUS
Status: COMPLETED | OUTPATIENT
Start: 2021-06-30 | End: 2021-06-30

## 2021-06-30 RX ADMIN — HYDRALAZINE HYDROCHLORIDE 10 MG: 20 INJECTION INTRAMUSCULAR; INTRAVENOUS at 03:03

## 2021-06-30 RX ADMIN — POTASSIUM CHLORIDE 10 MEQ: 7.46 INJECTION, SOLUTION INTRAVENOUS at 12:00

## 2021-06-30 RX ADMIN — PANTOPRAZOLE SODIUM 40 MG: 40 INJECTION, POWDER, FOR SOLUTION INTRAVENOUS at 08:52

## 2021-06-30 RX ADMIN — METOPROLOL TARTRATE 75 MG: 50 TABLET, FILM COATED ORAL at 08:52

## 2021-06-30 RX ADMIN — POTASSIUM CHLORIDE 10 MEQ: 7.46 INJECTION, SOLUTION INTRAVENOUS at 17:29

## 2021-06-30 RX ADMIN — POTASSIUM CHLORIDE 10 MEQ: 7.46 INJECTION, SOLUTION INTRAVENOUS at 15:11

## 2021-06-30 RX ADMIN — POTASSIUM CHLORIDE 20 MEQ: 29.8 INJECTION, SOLUTION INTRAVENOUS at 22:37

## 2021-06-30 RX ADMIN — POTASSIUM CHLORIDE 10 MEQ: 7.46 INJECTION, SOLUTION INTRAVENOUS at 13:05

## 2021-06-30 RX ADMIN — I.V. FAT EMULSION 250 ML: 20 EMULSION INTRAVENOUS at 20:26

## 2021-06-30 RX ADMIN — METOCLOPRAMIDE HYDROCHLORIDE 10 MG: 5 INJECTION INTRAMUSCULAR; INTRAVENOUS at 02:45

## 2021-06-30 RX ADMIN — AMLODIPINE BESYLATE 10 MG: 10 TABLET ORAL at 08:52

## 2021-06-30 RX ADMIN — METOPROLOL TARTRATE 75 MG: 50 TABLET, FILM COATED ORAL at 20:26

## 2021-06-30 RX ADMIN — Medication 5 ML: at 02:46

## 2021-06-30 RX ADMIN — POTASSIUM CHLORIDE 10 MEQ: 7.46 INJECTION, SOLUTION INTRAVENOUS at 14:13

## 2021-06-30 RX ADMIN — Medication 5 ML: at 18:46

## 2021-06-30 RX ADMIN — METOPROLOL TARTRATE 10 MG: 5 INJECTION INTRAVENOUS at 06:20

## 2021-06-30 RX ADMIN — POTASSIUM CHLORIDE 20 MEQ: 29.8 INJECTION, SOLUTION INTRAVENOUS at 23:39

## 2021-06-30 RX ADMIN — HEPARIN SODIUM 5000 UNITS: 5000 INJECTION, SOLUTION INTRAVENOUS; SUBCUTANEOUS at 23:47

## 2021-06-30 RX ADMIN — Medication 5 ML: at 07:57

## 2021-06-30 RX ADMIN — METOCLOPRAMIDE HYDROCHLORIDE 10 MG: 5 INJECTION INTRAMUSCULAR; INTRAVENOUS at 08:52

## 2021-06-30 RX ADMIN — Medication: at 20:26

## 2021-06-30 RX ADMIN — HEPARIN SODIUM 5000 UNITS: 5000 INJECTION, SOLUTION INTRAVENOUS; SUBCUTANEOUS at 12:00

## 2021-06-30 RX ADMIN — POTASSIUM CHLORIDE 10 MEQ: 7.46 INJECTION, SOLUTION INTRAVENOUS at 16:16

## 2021-06-30 ASSESSMENT — ACTIVITIES OF DAILY LIVING (ADL)
ADLS_ACUITY_SCORE: 19
ADLS_ACUITY_SCORE: 18
ADLS_ACUITY_SCORE: 18
ADLS_ACUITY_SCORE: 19
ADLS_ACUITY_SCORE: 18
ADLS_ACUITY_SCORE: 19

## 2021-06-30 ASSESSMENT — MIFFLIN-ST. JEOR: SCORE: 1686.1

## 2021-06-30 NOTE — PLAN OF CARE
Hypertensive overnight. Scheduled metoprolol given and PRN hydralazine given x1. Pressures still >150 systolic, provider paged and stated to wait until the day team rounds and they will make decisions. Voiding adequately into urinal. NG to LIS with green output. Not passing gas, no BM. Pt frustrated and just wants to go home. Will continue with plan of care.

## 2021-06-30 NOTE — PLAN OF CARE
"Home BP meds resumed and last BP improved.  NGT with large outputs. Abdomen distended, softer with faint BS.  Denies flatus.  K+ 3.0 and 3/6 replacement currently infusing.  TPN per orders. Ambulated in kelly x 2 and up in chair majority of shift.  Denies need for pain meds.  Refused CHG scrub; reinforced importance of scrub for infection prevention.  \"Maybe later\".  BG covered per orders. Large liquid black stool with some red streaks and + flatus.   "

## 2021-06-30 NOTE — PROVIDER NOTIFICATION
Notified MD at 0517 AM regarding elevated BP.      Spoke with: Channing    Orders were not obtained.    Comments: Continue watching pressures, give morning metropolol and will discuss with day team about restarting home meds.

## 2021-06-30 NOTE — PROGRESS NOTES
IP Diabetes Management  Daily Note           Assessment and Plan:   HPI: Da is a 76 year old male with a history of HTN, CKD III, hx CAD s/p CABG, HLP, TIIDM, obesity, and history of colon cancer s/p lap right hemicolectomy with a new unresectable polyp who was admitted 6/22/21 for recurrent ileocolic bowel resection. Post op course has been complicated by recurrent ileus, requiring NGT placement and now initiating TPN.      Assessment:   1) Type II Diabetes Mellitus, suboptimally controlled (A1c 8.8%), with post operative hyperglycemia    2) Anticipated TPN-induced hyperglycemia     Plan:    -continue Lantus 16 units daily (this may be continued despite NPO status)   -gently increase regular insulin in TPN to 19 units for 160g (0.12 units per gram dextrose)   -would need to add back carb coverage when diet resumed.   -continue aspart moderate intensity sliding scale AC/HS/0200, on continuous TPN.    -BG monitoring TID AC, HS, 0200   -when tolerating diet and is more medically stable, will assess for resumption in Metformin and Glipizide.   -hypoglycemia protocol   -carb counting protocol                 -diabetes education needs are not identified; he recently started Lantus insulin and is comfortable with injections.                  -on discharge, will recommend outpatient follow up with [facility] service within 2-3 weeks for reassessment.    Plan discussed with patient, RD,and  primary team via this note.     Interval History and Assessment: interval glucose trend reviewed:   BG beginning to trend up on current regimen, with TPN started yesterday evening.    Dextrose content will remain the same today (160g) ; monitoring for refeeding.    Da is frustrated with setbacks and wants to go home.     Current nutritional intake and type: Orders Placed This Encounter      Diet      NPO for Medical/Clinical Reasons Except for: Ice Chips, Meds  continuous TPN: 160 g dextrose to start, with goal of 260g  (advancing by 50g each day if lytes are stable).     PTA Diabetes Regimen:   BG monitorin-3 times daily  Metformin IR 1000 mg BID WM  Glipizide IR 10mg BID WM  Lantus 20 units daily HS    Discharge Planning: TBD           Diabetes History:   Type of Diabetes: Type 2 Diabetes Mellitus, TPN/Enteral Feeding Induced Hyperglycemia  Lab Results   Component Value Date    A1C 8.8 2021    A1C 8.9 2021              Review of Systems:     The Review of Systems is negative other than noted in the Interval History.           Medications:     Current Facility-Administered Medications   Medication     [Held by provider] acetaminophen (TYLENOL) tablet 975 mg     amLODIPine (NORVASC) tablet 10 mg     [Held by provider] atorvastatin (LIPITOR) tablet 40 mg     benzocaine 20% (HURRICAINE/TOPEX) 20 % spray 0.5-1 mL     dextrose 10% infusion     glucose gel 15-30 g    Or     dextrose 50 % injection 25-50 mL    Or     glucagon injection 1 mg     [Held by provider] gabapentin (NEURONTIN) capsule 300 mg     heparin ANTICOAGULANT injection 5,000 Units     heparin lock flush 10 UNIT/ML injection 2-5 mL     heparin lock flush 10 UNIT/ML injection 5-10 mL     heparin lock flush 10 UNIT/ML injection 5-10 mL     hydrALAZINE (APRESOLINE) injection 10 mg     HYDROmorphone (DILAUDID) injection 0.2 mg    Or     HYDROmorphone (DILAUDID) injection 0.4 mg     insulin aspart (NovoLOG) injection (RAPID ACTING)     insulin aspart (NovoLOG) injection (RAPID ACTING)     insulin glargine (LANTUS PEN) injection 16 Units     lidocaine (LMX4) cream     lidocaine (LMX4) cream     lidocaine 1 % 0.1-1 mL     lidocaine 1 % 0.1-1 mL     lipids (INTRALIPID) 20 % infusion 250 mL     melatonin tablet 5 mg     metoprolol tartrate (LOPRESSOR) tablet 75 mg     naloxone (NARCAN) injection 0.2 mg    Or     naloxone (NARCAN) injection 0.4 mg    Or     naloxone (NARCAN) injection 0.2 mg    Or     naloxone (NARCAN) injection 0.4 mg     ondansetron (ZOFRAN-ODT)  "ODT tab 4 mg    Or     ondansetron (ZOFRAN) injection 4 mg     [Held by provider] oxyCODONE (ROXICODONE) tablet 5 mg     pantoprazole (PROTONIX) IV push injection 40 mg     parenteral nutrition - ADULT compounded formula     parenteral nutrition - ADULT compounded formula     sodium chloride (PF) 0.9% PF flush 10-20 mL     sodium chloride (PF) 0.9% PF flush 3 mL     sodium chloride (PF) 0.9% PF flush 3 mL     sodium chloride (PF) 0.9% PF flush 5-50 mL     sodium chloride 0.9% infusion     [Held by provider] tamsulosin (FLOMAX) capsule 0.4 mg            Physical Exam:    BP (!) 174/75 (BP Location: Left arm)   Pulse 110   Temp 97.5  F (36.4  C) (Oral)   Resp 18   Ht 1.727 m (5' 8\")   Wt 97.2 kg (214 lb 3.2 oz)   SpO2 90%   BMI 32.57 kg/m    General: pleasant, in no distress, sitting up in chair.   HEENT: normocephalic, atraumatic. Oral mucous membranes moist. NGT with green output  Lungs: unlabored respiration  ABD: appears moderately distended.   Skin: dry, no obvious lesions  Lymp:  no LE edema   Mental status:  alert, awake, oriented to situation   Psych:  calm affecy            Data:     Recent Labs   Lab 06/30/21  0804 06/30/21  0203 06/29/21  2214 06/29/21  1654 06/29/21  1549 06/29/21  1158 06/29/21  0722 06/29/21  0212 06/28/21  0830 06/28/21  0830 06/27/21  0834 06/27/21  0834 06/26/21  0418 06/26/21  0418 06/25/21  0642 06/25/21  0642   *  --   --   --   --   --  141*  --   --  166*  --  184*  --  170*  --  252*   BGM  --  162* 130* 119* 125* 136*  --  148*   < >  --    < >  --    < >  --    < >  --     < > = values in this interval not displayed.     Lab Results   Component Value Date    WBC 5.8 06/29/2021    WBC 5.9 06/28/2021    WBC 11.0 06/26/2021    HGB 7.6 (L) 06/29/2021    HGB 7.3 (L) 06/28/2021    HGB 8.5 (L) 06/26/2021    HCT 24.4 (L) 06/29/2021    HCT 24.4 (L) 06/28/2021    HCT 26.8 (L) 06/26/2021    MCV 87 06/29/2021    MCV 88 06/28/2021    MCV 85 06/26/2021     06/29/2021    "  06/28/2021     06/26/2021     Lab Results   Component Value Date     06/30/2021     06/29/2021     06/28/2021    POTASSIUM 3.0 (L) 06/30/2021    POTASSIUM 3.5 06/29/2021    POTASSIUM 3.8 06/28/2021    CHLORIDE 107 06/30/2021    CHLORIDE 108 06/29/2021    CHLORIDE 106 06/28/2021    CO2 23 06/30/2021    CO2 22 06/29/2021    CO2 24 06/28/2021     (H) 06/30/2021     (H) 06/29/2021     (H) 06/28/2021     Lab Results   Component Value Date    BUN 21 06/29/2021    BUN 22 06/28/2021    BUN 24 06/27/2021     No results found for: TSH  Lab Results   Component Value Date    AST 12 06/29/2021    AST 20 06/18/2021    AST 18 06/02/2021    ALT 20 06/29/2021    ALT 43 06/18/2021    ALT 43 06/02/2021    ALKPHOS 88 06/29/2021    ALKPHOS 92 06/18/2021    ALKPHOS 105 06/02/2021     35 minutes spent on the date of the encounter doing chart review, history and exam, documentation and further activities per the note      Over 50% of my time on the unit was spent counseling the patient and/or coordinating care regarding acute hyperglycemia management.  See note for details.    To contact Endocrine Diabetes service:   From 8AM-4PM: page inpatient diabetes provider that is following the patient  For questions or updates from 4PM-8AM: page the diabetes job code for on call fellow: [phone]    Isabel Mckeon PA-C  Inpatient Diabetes Management Service  Pager [phone]

## 2021-06-30 NOTE — PLAN OF CARE
Pt remains hypertensive this evening. Insulin not given due to BS below parameters. NPO, Pt denies n/v, still not passing gas. Bowel sounds are hypoactive in all quadrants. TPN and lipids infusing through PICC line.NGT on intermittent suctioning, 600 ml of green bile. Bilateral edema in legs and feet, encouraged to walk. In chair all evening, walked x2. Candie Jackson RN on 6/29/2021 at 11:09 PM

## 2021-06-30 NOTE — PROGRESS NOTES
"Colorectal Surgery Progress Note    Subjective: Patient was able to walk several times yesterday, feeling \"tipsy\" during the third time.  He is not passing gas yet, and is still somewhat depressed by this.  He is not \"bothered too much\" by NG tube, and is tolerating the TPN well.  He denies CP, SOB, n/v    Objective:   BP (!) 174/75 (BP Location: Left arm)   Pulse 110   Temp 97.5  F (36.4  C) (Oral)   Resp 18   Ht 1.727 m (5' 8\")   Wt 97.2 kg (214 lb 3.2 oz)   SpO2 90%   BMI 32.57 kg/m      PE:  Gen: sitting up in chair; normal affectt; conversing well  Neuro: AAOx2; NAD; Moving all four extremities without difficulty  HEENT: NGT in place to suction with brown output; anicteric sclera; vocalizing without difficulty  Cardiac: regular rate and rhythym  Resp: non-labored breathing on RA  Abd: distended, nontender, no rebound/guarding, no palpable masses  Ext: warm and well perfused; PICC line in place with TPN   Incision: c/d/i      Intake/Output    Intake/Output Summary (Last 24 hours) at 6/30/2021 0932  Last data filed at 6/30/2021 0900  Gross per 24 hour   Intake 2185.19 ml   Output 2650 ml   Net -464.81 ml       In    A/P: 76M hx MI, s/p CABG, stage 3 renal disease, DM2, HTN, hx lap R hemicolectomy in 2019 for colon cancer (pT2N0; 0/23 LN+) and surveillance colonoscopy revealing recurrent unresectable polyp concerning for cancer recurrence (although path showed tubulovillous adenoma) now s/p transverse colectomy 6/22 c/b ileus 6/25 requiring NG placement; removed 6/27 and subsequently replaced evening of 6/28. CT scan performed 6/28 without acute findings but confirming ileus. Will keep NPO with NGT and IV fluids as we await ROBF. TPN started at 75mL/hr.  Will restart home blood pressure medication.      - NPO  - NG tube to suction until ROBF (CLAMP for medications)  - NS 75/hr  - D/C IV Metoprolol  - PTA Metoprolol 75mg BID via NG tube  - PTA Amlodipine 10mg Daily via NG tube  - IV PPI  - Appreciate " endocrine assistance with diabetes management; appreciate excellent care  - Dispo: home in coming days pending ileus resolution and ROBF. 30d Lovenox at discharge    Seen and discussed with fellow and staff.    Adebayo Escobar MD  Surgery PGY-1

## 2021-07-01 ENCOUNTER — APPOINTMENT (OUTPATIENT)
Dept: PHYSICAL THERAPY | Facility: CLINIC | Age: 76
DRG: 330 | End: 2021-07-01
Attending: SURGERY
Payer: MEDICARE

## 2021-07-01 ENCOUNTER — APPOINTMENT (OUTPATIENT)
Dept: OCCUPATIONAL THERAPY | Facility: CLINIC | Age: 76
DRG: 330 | End: 2021-07-01
Attending: SURGERY
Payer: MEDICARE

## 2021-07-01 ENCOUNTER — HOME INFUSION (PRE-WILLOW HOME INFUSION) (OUTPATIENT)
Dept: PHARMACY | Facility: CLINIC | Age: 76
End: 2021-07-01

## 2021-07-01 PROBLEM — E87.6 HYPOKALEMIA: Status: ACTIVE | Noted: 2021-07-01

## 2021-07-01 PROBLEM — E87.6 HYPOKALEMIA: Status: RESOLVED | Noted: 2021-07-01 | Resolved: 2021-07-01

## 2021-07-01 LAB
ANION GAP SERPL CALCULATED.3IONS-SCNC: 6 MMOL/L (ref 3–14)
BUN SERPL-MCNC: 28 MG/DL (ref 7–30)
CALCIUM SERPL-MCNC: 7.5 MG/DL (ref 8.5–10.1)
CHLORIDE SERPL-SCNC: 111 MMOL/L (ref 94–109)
CO2 SERPL-SCNC: 21 MMOL/L (ref 20–32)
CREAT SERPL-MCNC: 1.02 MG/DL (ref 0.66–1.25)
GFR SERPL CREATININE-BSD FRML MDRD: 72 ML/MIN/{1.73_M2}
GLUCOSE BLDC GLUCOMTR-MCNC: 165 MG/DL (ref 70–99)
GLUCOSE BLDC GLUCOMTR-MCNC: 167 MG/DL (ref 70–99)
GLUCOSE BLDC GLUCOMTR-MCNC: 170 MG/DL (ref 70–99)
GLUCOSE BLDC GLUCOMTR-MCNC: 173 MG/DL (ref 70–99)
GLUCOSE BLDC GLUCOMTR-MCNC: 187 MG/DL (ref 70–99)
GLUCOSE SERPL-MCNC: 210 MG/DL (ref 70–99)
MAGNESIUM SERPL-MCNC: 2 MG/DL (ref 1.6–2.3)
PHOSPHATE SERPL-MCNC: 2.2 MG/DL (ref 2.5–4.5)
POTASSIUM SERPL-SCNC: 3.4 MMOL/L (ref 3.4–5.3)
POTASSIUM SERPL-SCNC: 3.7 MMOL/L (ref 3.4–5.3)
SODIUM SERPL-SCNC: 139 MMOL/L (ref 133–144)

## 2021-07-01 PROCEDURE — 250N000013 HC RX MED GY IP 250 OP 250 PS 637

## 2021-07-01 PROCEDURE — 36415 COLL VENOUS BLD VENIPUNCTURE: CPT | Performed by: SURGERY

## 2021-07-01 PROCEDURE — 80048 BASIC METABOLIC PNL TOTAL CA: CPT | Performed by: SURGERY

## 2021-07-01 PROCEDURE — 97530 THERAPEUTIC ACTIVITIES: CPT | Mod: GO

## 2021-07-01 PROCEDURE — 120N000002 HC R&B MED SURG/OB UMMC

## 2021-07-01 PROCEDURE — 36592 COLLECT BLOOD FROM PICC: CPT | Performed by: SURGERY

## 2021-07-01 PROCEDURE — C9113 INJ PANTOPRAZOLE SODIUM, VIA: HCPCS | Performed by: STUDENT IN AN ORGANIZED HEALTH CARE EDUCATION/TRAINING PROGRAM

## 2021-07-01 PROCEDURE — 83735 ASSAY OF MAGNESIUM: CPT | Performed by: SURGERY

## 2021-07-01 PROCEDURE — 250N000011 HC RX IP 250 OP 636: Performed by: STUDENT IN AN ORGANIZED HEALTH CARE EDUCATION/TRAINING PROGRAM

## 2021-07-01 PROCEDURE — 84100 ASSAY OF PHOSPHORUS: CPT | Performed by: SURGERY

## 2021-07-01 PROCEDURE — 250N000013 HC RX MED GY IP 250 OP 250 PS 637: Performed by: PHYSICIAN ASSISTANT

## 2021-07-01 PROCEDURE — 250N000011 HC RX IP 250 OP 636: Performed by: SURGERY

## 2021-07-01 PROCEDURE — 97535 SELF CARE MNGMENT TRAINING: CPT | Mod: GO

## 2021-07-01 PROCEDURE — 99233 SBSQ HOSP IP/OBS HIGH 50: CPT | Mod: 24 | Performed by: PHYSICIAN ASSISTANT

## 2021-07-01 PROCEDURE — 97116 GAIT TRAINING THERAPY: CPT | Mod: GP | Performed by: REHABILITATION PRACTITIONER

## 2021-07-01 PROCEDURE — 84132 ASSAY OF SERUM POTASSIUM: CPT | Performed by: SURGERY

## 2021-07-01 PROCEDURE — 97530 THERAPEUTIC ACTIVITIES: CPT | Mod: GP | Performed by: REHABILITATION PRACTITIONER

## 2021-07-01 PROCEDURE — 250N000011 HC RX IP 250 OP 636: Performed by: PHYSICIAN ASSISTANT

## 2021-07-01 PROCEDURE — 999N001017 HC STATISTIC GLUCOSE BY METER IP

## 2021-07-01 PROCEDURE — 250N000009 HC RX 250: Performed by: SURGERY

## 2021-07-01 RX ORDER — METOPROLOL TARTRATE 50 MG
150 TABLET ORAL 2 TIMES DAILY
Status: DISCONTINUED | OUTPATIENT
Start: 2021-07-02 | End: 2021-07-05 | Stop reason: HOSPADM

## 2021-07-01 RX ORDER — LOSARTAN POTASSIUM AND HYDROCHLOROTHIAZIDE 25; 100 MG/1; MG/1
1 TABLET ORAL EVERY EVENING
Status: DISCONTINUED | OUTPATIENT
Start: 2021-07-01 | End: 2021-07-05 | Stop reason: HOSPADM

## 2021-07-01 RX ORDER — MAGNESIUM SULFATE HEPTAHYDRATE 40 MG/ML
2 INJECTION, SOLUTION INTRAVENOUS ONCE
Status: COMPLETED | OUTPATIENT
Start: 2021-07-01 | End: 2021-07-01

## 2021-07-01 RX ORDER — METOPROLOL TARTRATE 50 MG
100 TABLET ORAL 2 TIMES DAILY
Status: DISCONTINUED | OUTPATIENT
Start: 2021-07-01 | End: 2021-07-01

## 2021-07-01 RX ORDER — POTASSIUM CHLORIDE 7.45 MG/ML
10 INJECTION INTRAVENOUS
Status: COMPLETED | OUTPATIENT
Start: 2021-07-01 | End: 2021-07-01

## 2021-07-01 RX ADMIN — METOPROLOL TARTRATE 100 MG: 50 TABLET, FILM COATED ORAL at 19:44

## 2021-07-01 RX ADMIN — METOPROLOL TARTRATE 75 MG: 50 TABLET, FILM COATED ORAL at 08:14

## 2021-07-01 RX ADMIN — POTASSIUM CHLORIDE 10 MEQ: 7.46 INJECTION, SOLUTION INTRAVENOUS at 10:40

## 2021-07-01 RX ADMIN — AMLODIPINE BESYLATE 10 MG: 10 TABLET ORAL at 08:14

## 2021-07-01 RX ADMIN — LOSARTAN POTASSIUM AND HYDROCHLOROTHIAZIDE 1 TABLET: 25; 100 TABLET ORAL at 19:44

## 2021-07-01 RX ADMIN — POTASSIUM CHLORIDE 10 MEQ: 7.46 INJECTION, SOLUTION INTRAVENOUS at 09:39

## 2021-07-01 RX ADMIN — Medication: at 20:10

## 2021-07-01 RX ADMIN — I.V. FAT EMULSION 250 ML: 20 EMULSION INTRAVENOUS at 20:10

## 2021-07-01 RX ADMIN — HEPARIN SODIUM 5000 UNITS: 5000 INJECTION, SOLUTION INTRAVENOUS; SUBCUTANEOUS at 12:56

## 2021-07-01 RX ADMIN — POTASSIUM CHLORIDE 10 MEQ: 7.46 INJECTION, SOLUTION INTRAVENOUS at 14:57

## 2021-07-01 RX ADMIN — Medication 5 ML: at 20:16

## 2021-07-01 RX ADMIN — MAGNESIUM SULFATE IN WATER 2 G: 40 INJECTION, SOLUTION INTRAVENOUS at 08:19

## 2021-07-01 RX ADMIN — POTASSIUM CHLORIDE 10 MEQ: 7.46 INJECTION, SOLUTION INTRAVENOUS at 13:40

## 2021-07-01 RX ADMIN — PANTOPRAZOLE SODIUM 40 MG: 40 INJECTION, POWDER, FOR SOLUTION INTRAVENOUS at 08:14

## 2021-07-01 ASSESSMENT — ACTIVITIES OF DAILY LIVING (ADL)
ADLS_ACUITY_SCORE: 19
ADLS_ACUITY_SCORE: 18

## 2021-07-01 NOTE — PROGRESS NOTES
Therapy: TPN  Insurance: Medicare and Aarp Supplement    Pt meets TPN medicare criteria as long as 90 days NEO is documented in chart.    Between the two plans, coverage for TPN is at 100%.     For nursing, pt must be homebound for coverage      In reference to admission on 6/22/21 to check TPN coverage    Please contact Intake with any questions, [phone] or In Basket pool,  Home Infusion ([phone]).

## 2021-07-01 NOTE — PLAN OF CARE
Still hypertensive, but improved.  NGT clamped x 5 hours with 0 residual.  NGT pulled and advanced to clears this afternoon.  Abdomen distended but significantly softer Continues with +flatus and loose stools (voids not saved due to stools) but amounts decreasing.  Denies pain.  Ambulated long distance in kelly x 2 and showered. TPN per orders. K+ replaced (took much of the day due to medication not available in pyxis.) Redraw at 1800.

## 2021-07-01 NOTE — PROGRESS NOTES
IP Diabetes Management  Daily Note           Assessment and Plan:   HPI: Da is a 76 year old male with a history of HTN, CKD III, hx CAD s/p CABG, HLP, TIIDM, obesity, and history of colon cancer s/p lap right hemicolectomy with a new unresectable polyp who was admitted 6/22/21 for recurrent ileocolic bowel resection. Post op course has been complicated by recurrent ileus, requiring NGT placement and now initiating TPN.      Assessment:   1) Type II Diabetes Mellitus, suboptimally controlled (A1c 8.8%), with post operative hyperglycemia    2) Anticipated TPN-induced hyperglycemia     Plan:    -gently increase Lantus from 16 to 18 units daily (will start tomorrow)   -increase regular insulin in TPN to 0.13 units per gram (27 units for 210 grams dextrose)   -would need to add back carb coverage when diet resumed.   -continue aspart moderate intensity sliding scale AC/HS/0200, on continuous TPN.    -BG monitoring TID AC, HS, 0200   -when tolerating diet and is more medically stable, will assess for resumption in Metformin and Glipizide.   -hypoglycemia protocol   -carb counting protocol                 -diabetes education needs are not identified; he recently started Lantus insulin and is comfortable with injections.                  -on discharge, will recommend outpatient follow up with [facility] service within 2-3 weeks for reassessment.    Plan discussed with patient, RD,and  primary team via this note.     Interval History and Assessment: interval glucose trend reviewed:   BG are remaining at high end of target with continued TPN; gently increased insulin in bag yesterday.     Plans for TPN today: increase from 160 to 210 g dextrose.    Large BM yesterday. He endorses that he does not intend to continue insulin following discharge. We reviewed that, this will depend on TPN needs for home, and level of PO intake. His BG were poorly controlled PTA, resulting in a need to add Lantus insulin about a week  before this admission.    Current nutritional intake and type: Orders Placed This Encounter      Diet      NPO for Medical/Clinical Reasons Except for: Ice Chips, Meds  continuous TPN: 160 g dextrose to start, with goal of 260g (advancing by 50g each day if lytes are stable).     PTA Diabetes Regimen:   BG monitorin-3 times daily  Metformin IR 1000 mg BID WM  Glipizide IR 10mg BID WM  Lantus 20 units daily HS    Discharge Planning: TBD           Diabetes History:   Type of Diabetes: Type 2 Diabetes Mellitus, TPN/Enteral Feeding Induced Hyperglycemia  Lab Results   Component Value Date    A1C 8.8 2021    A1C 8.9 2021              Review of Systems:     The Review of Systems is negative other than noted in the Interval History.           Medications:     Current Facility-Administered Medications   Medication     [Held by provider] acetaminophen (TYLENOL) tablet 975 mg     amLODIPine (NORVASC) tablet 10 mg     [Held by provider] atorvastatin (LIPITOR) tablet 40 mg     benzocaine 20% (HURRICAINE/TOPEX) 20 % spray 0.5-1 mL     dextrose 10% infusion     glucose gel 15-30 g    Or     dextrose 50 % injection 25-50 mL    Or     glucagon injection 1 mg     [Held by provider] gabapentin (NEURONTIN) capsule 300 mg     heparin ANTICOAGULANT injection 5,000 Units     heparin lock flush 10 UNIT/ML injection 2-5 mL     heparin lock flush 10 UNIT/ML injection 5-10 mL     heparin lock flush 10 UNIT/ML injection 5-10 mL     hydrALAZINE (APRESOLINE) injection 10 mg     HYDROmorphone (DILAUDID) injection 0.2 mg    Or     HYDROmorphone (DILAUDID) injection 0.4 mg     insulin aspart (NovoLOG) injection (RAPID ACTING)     insulin aspart (NovoLOG) injection (RAPID ACTING)     [START ON 2021] insulin glargine (LANTUS PEN) injection 18 Units     lidocaine (LMX4) cream     lidocaine (LMX4) cream     lidocaine 1 % 0.1-1 mL     lidocaine 1 % 0.1-1 mL     lipids (INTRALIPID) 20 % infusion 250 mL      "losartan-hydrochlorothiazide (HYZAAR) 100-25 MG per tablet 1 tablet     melatonin tablet 5 mg     metoprolol tartrate (LOPRESSOR) tablet 100 mg     naloxone (NARCAN) injection 0.2 mg    Or     naloxone (NARCAN) injection 0.4 mg    Or     naloxone (NARCAN) injection 0.2 mg    Or     naloxone (NARCAN) injection 0.4 mg     ondansetron (ZOFRAN-ODT) ODT tab 4 mg    Or     ondansetron (ZOFRAN) injection 4 mg     [Held by provider] oxyCODONE (ROXICODONE) tablet 5 mg     pantoprazole (PROTONIX) IV push injection 40 mg     parenteral nutrition - ADULT compounded formula     parenteral nutrition - ADULT compounded formula     sodium chloride (PF) 0.9% PF flush 10-20 mL     sodium chloride (PF) 0.9% PF flush 3 mL     sodium chloride (PF) 0.9% PF flush 3 mL     sodium chloride (PF) 0.9% PF flush 5-50 mL     sodium chloride 0.9% infusion     [Held by provider] tamsulosin (FLOMAX) capsule 0.4 mg            Physical Exam:    BP (!) 156/69 (BP Location: Left arm)   Pulse 99   Temp 98.1  F (36.7  C) (Temporal)   Resp 16   Ht 1.727 m (5' 8\")   Wt 97.2 kg (214 lb 3.2 oz)   SpO2 97%   BMI 32.57 kg/m    General: pleasant, in no distress, laying flat in bed.   HEENT: normocephalic, atraumatic. Oral mucous membranes moist. NGT present.   Lungs: unlabored respiration  ABD: appears moderately distended.   Skin: dry, no obvious lesions  Lymp:  no LE edema   Mental status:  alert, awake, oriented to situation   Psych:  calm affect.            Data:     Recent Labs   Lab 07/01/21  1138 07/01/21  0711 07/01/21  0215 06/30/21  2245 06/30/21  1556 06/30/21  1220 06/30/21  0804 06/30/21  0203 06/29/21  0722 06/29/21  0722 06/28/21  0830 06/28/21  0830 06/27/21  0834 06/27/21  0834 06/26/21  0418 06/26/21  0418   GLC  --  210*  --   --   --   --  188*  --   --  141*  --  166*  --  184*  --  170*   *  --  167* 193* 184* 179*  --  162*   < >  --    < >  --    < >  --    < >  --     < > = values in this interval not displayed.     Lab " Results   Component Value Date    WBC 5.8 06/29/2021    WBC 5.9 06/28/2021    WBC 11.0 06/26/2021    HGB 7.6 (L) 06/29/2021    HGB 7.3 (L) 06/28/2021    HGB 8.5 (L) 06/26/2021    HCT 24.4 (L) 06/29/2021    HCT 24.4 (L) 06/28/2021    HCT 26.8 (L) 06/26/2021    MCV 87 06/29/2021    MCV 88 06/28/2021    MCV 85 06/26/2021     06/29/2021     06/28/2021     06/26/2021     Lab Results   Component Value Date     07/01/2021     06/30/2021     06/29/2021    POTASSIUM 3.4 07/01/2021    POTASSIUM 3.2 (L) 06/30/2021    POTASSIUM 3.0 (L) 06/30/2021    CHLORIDE 111 (H) 07/01/2021    CHLORIDE 107 06/30/2021    CHLORIDE 108 06/29/2021    CO2 21 07/01/2021    CO2 23 06/30/2021    CO2 22 06/29/2021     (H) 07/01/2021     (H) 06/30/2021     (H) 06/29/2021     Lab Results   Component Value Date    BUN 28 07/01/2021    BUN 24 06/30/2021    BUN 21 06/29/2021     No results found for: TSH  Lab Results   Component Value Date    AST 12 06/30/2021    AST 12 06/29/2021    AST 20 06/18/2021    ALT 19 06/30/2021    ALT 20 06/29/2021    ALT 43 06/18/2021    ALKPHOS 86 06/30/2021    ALKPHOS 88 06/29/2021    ALKPHOS 92 06/18/2021     35 minutes spent on the date of the encounter doing chart review, history and exam, documentation and further activities per the note      Over 50% of my time on the unit was spent counseling the patient and/or coordinating care regarding acute hyperglycemia management.  See note for details.    To contact Endocrine Diabetes service:   From 8AM-4PM: page inpatient diabetes provider that is following the patient  For questions or updates from 4PM-8AM: page the diabetes job code for on call fellow: [phone]    Isabel Mckeon PA-C  Inpatient Diabetes Management Service  Pager [phone]

## 2021-07-01 NOTE — PLAN OF CARE
VSS overnight, hypertensive but no PRNs needed to control BP. 4 loose Bms this shift. Pt feeling winded getting up to the bathroom and walking back. Bowel sounds present, passing gas. Denies pain. 0200 BS corrected with 1 unit. Patient in a better mood than last night and got much better sleep despite the multiple trips to the bathroom. Will continue with plan of care.

## 2021-07-01 NOTE — PLAN OF CARE
"-140, controlled with PO metoprolol. Pt NPO, ice chips and meds. NGT to LIS, drained 100 ml. Pt reports having what he described as \"hunger pains/cramping\" but no pain or discomfort with abdomen other than itching. Had 2 large loose BM with flatus. PICC line infusing TPN and lipids. K+ infused 6/6 earlier today, now at 3.2, needs further replacement. Labs ordered for tomorrow AM. Feet bilaterally have +1 edema. CHG wipes encouraged but Pt refused, wanting a shower tomorrow instead. Bedtime insulin given for BS of 193. Ambulated in kelly x1. Candie Jackson RN on 6/30/2021 at 10:16 PM   "

## 2021-07-01 NOTE — PROGRESS NOTES
Colorectal Surgery Progress Note  7/1/2021     Subjective:  No acute events overnight, pain well controlled overnight. No nausea, vomiting. Passing gas and several BMs overnight. Voiding without difficulty.     Objective:  Temp:  [97.6  F (36.4  C)-98.3  F (36.8  C)] 98.3  F (36.8  C)  Pulse:  [] 115  Resp:  [16-18] 18  BP: (134-154)/(60-69) 154/60  SpO2:  [90 %-96 %] 96 %  I/O last 3 completed shifts:  In: 2156.69 [I.V.:520]  Out: 2175 [Urine:1175; Emesis/NG output:700; Stool:300]    Gen: awake, NAD  HEENT: NCAT, EOMI. NGT in place  CV: non cyanotic  Resp: unlabored breathing on RA  Abd: softer and less distended compared to prior. Non tender. Incisions c/d/i.   Ext: no significant edema     UOP: 1.3L yest, 150cc since MDNT  NGT: 1.5L yesterday, minimal since midnight     BMP  Recent Labs   Lab 07/01/21  0711 06/30/21  1954 06/30/21  0804 06/29/21  0722 06/28/21  0830     --  139 138 139   POTASSIUM 3.4 3.2* 3.0* 3.5 3.8   CHLORIDE 111*  --  107 108 106   BOOGIE 7.5*  --  8.0* 8.3* 8.2*   CO2 21  --  23 22 24   BUN 28  --  24 21 22   CR 1.02  --  1.06 1.00 1.04   *  --  188* 141* 166*   MAG 2.0  --  2.1 2.3 2.3   PHOS 2.2*  --  2.1* 2.8  --      CBC  Recent Labs   Lab 06/29/21  0722 06/28/21  0830 06/26/21  0418 06/25/21  0642   WBC 5.8 5.9 11.0 14.1*   RBC 2.81* 2.77* 3.14* 3.26*   HGB 7.6* 7.3* 8.5* 8.7*   HCT 24.4* 24.4* 26.8* 27.3*   MCV 87 88 85 84   MCH 27.0 26.4* 27.1 26.7   MCHC 31.1* 29.9* 31.7 31.9   RDW 15.7* 15.5* 15.1* 14.7    340 311 249     A/P:   76M hx MI, s/p CABG, stage 3 renal disease, DM2, HTN, hx lap R hemicolectomy in 2019 for colon cancer (pT2N0; 0/23 LN+) and surveillance colonoscopy revealing recurrent unresectable polyp concerning for cancer recurrence (although path showed tubulovillous adenoma) now s/p transverse colectomy 6/22 c/b ileus requiring repeat NGT placement, prolonged NPO requiring PICC/TPN. Now with antegrade bowel function and downtrending NGT output.      -Pain control  -NPO, TPN+IVF @ 75cc/hr  -NGT, clamp trial today  -Continue PTA amlodipine and metoprolol, restart hydrochlorothiazide-losartan, holding spironolactone     -IV PPI  -Ppx: SQH     Seen and discussed with fellow, Dr. Cheng, who discussed with staff.     Nelda Mckeon MD  General Surgery, PGY3  [phone]

## 2021-07-02 ENCOUNTER — APPOINTMENT (OUTPATIENT)
Dept: PHYSICAL THERAPY | Facility: CLINIC | Age: 76
DRG: 330 | End: 2021-07-02
Attending: SURGERY
Payer: MEDICARE

## 2021-07-02 LAB
ANION GAP SERPL CALCULATED.3IONS-SCNC: 6 MMOL/L (ref 3–14)
BUN SERPL-MCNC: 24 MG/DL (ref 7–30)
CALCIUM SERPL-MCNC: 7.4 MG/DL (ref 8.5–10.1)
CHLORIDE SERPL-SCNC: 109 MMOL/L (ref 94–109)
CO2 SERPL-SCNC: 21 MMOL/L (ref 20–32)
CREAT SERPL-MCNC: 0.99 MG/DL (ref 0.66–1.25)
GFR SERPL CREATININE-BSD FRML MDRD: 75 ML/MIN/{1.73_M2}
GLUCOSE BLDC GLUCOMTR-MCNC: 170 MG/DL (ref 70–99)
GLUCOSE BLDC GLUCOMTR-MCNC: 182 MG/DL (ref 70–99)
GLUCOSE BLDC GLUCOMTR-MCNC: 185 MG/DL (ref 70–99)
GLUCOSE BLDC GLUCOMTR-MCNC: 190 MG/DL (ref 70–99)
GLUCOSE BLDC GLUCOMTR-MCNC: 193 MG/DL (ref 70–99)
GLUCOSE BLDC GLUCOMTR-MCNC: 198 MG/DL (ref 70–99)
GLUCOSE SERPL-MCNC: 204 MG/DL (ref 70–99)
MAGNESIUM SERPL-MCNC: 2.2 MG/DL (ref 1.6–2.3)
PHOSPHATE SERPL-MCNC: 2.4 MG/DL (ref 2.5–4.5)
POTASSIUM SERPL-SCNC: 3.8 MMOL/L (ref 3.4–5.3)
SODIUM SERPL-SCNC: 136 MMOL/L (ref 133–144)

## 2021-07-02 PROCEDURE — 84100 ASSAY OF PHOSPHORUS: CPT | Performed by: SURGERY

## 2021-07-02 PROCEDURE — 250N000013 HC RX MED GY IP 250 OP 250 PS 637: Performed by: PHYSICIAN ASSISTANT

## 2021-07-02 PROCEDURE — 97116 GAIT TRAINING THERAPY: CPT | Mod: GP

## 2021-07-02 PROCEDURE — 120N000002 HC R&B MED SURG/OB UMMC

## 2021-07-02 PROCEDURE — 83735 ASSAY OF MAGNESIUM: CPT | Performed by: SURGERY

## 2021-07-02 PROCEDURE — 99233 SBSQ HOSP IP/OBS HIGH 50: CPT | Mod: 24 | Performed by: PHYSICIAN ASSISTANT

## 2021-07-02 PROCEDURE — C9113 INJ PANTOPRAZOLE SODIUM, VIA: HCPCS | Performed by: STUDENT IN AN ORGANIZED HEALTH CARE EDUCATION/TRAINING PROGRAM

## 2021-07-02 PROCEDURE — 250N000013 HC RX MED GY IP 250 OP 250 PS 637

## 2021-07-02 PROCEDURE — 36415 COLL VENOUS BLD VENIPUNCTURE: CPT | Performed by: SURGERY

## 2021-07-02 PROCEDURE — 250N000013 HC RX MED GY IP 250 OP 250 PS 637: Performed by: SURGERY

## 2021-07-02 PROCEDURE — 250N000011 HC RX IP 250 OP 636: Performed by: STUDENT IN AN ORGANIZED HEALTH CARE EDUCATION/TRAINING PROGRAM

## 2021-07-02 PROCEDURE — 999N001017 HC STATISTIC GLUCOSE BY METER IP

## 2021-07-02 PROCEDURE — 250N000011 HC RX IP 250 OP 636: Performed by: PHYSICIAN ASSISTANT

## 2021-07-02 PROCEDURE — 80048 BASIC METABOLIC PNL TOTAL CA: CPT | Performed by: SURGERY

## 2021-07-02 RX ORDER — ACETAMINOPHEN 325 MG/1
975 TABLET ORAL EVERY 6 HOURS
Status: DISCONTINUED | OUTPATIENT
Start: 2021-07-02 | End: 2021-07-03

## 2021-07-02 RX ORDER — AMLODIPINE BESYLATE 10 MG/1
10 TABLET ORAL DAILY
Qty: 30 TABLET | Refills: 0 | Status: SHIPPED | OUTPATIENT
Start: 2021-07-02

## 2021-07-02 RX ORDER — ACETAMINOPHEN 325 MG/1
975 TABLET ORAL EVERY 6 HOURS
COMMUNITY
Start: 2021-07-02

## 2021-07-02 RX ORDER — TAMSULOSIN HYDROCHLORIDE 0.4 MG/1
0.4 CAPSULE ORAL DAILY
Status: DISCONTINUED | OUTPATIENT
Start: 2021-07-02 | End: 2021-07-05 | Stop reason: HOSPADM

## 2021-07-02 RX ADMIN — TAMSULOSIN HYDROCHLORIDE 0.4 MG: 0.4 CAPSULE ORAL at 17:24

## 2021-07-02 RX ADMIN — Medication 5 ML: at 17:25

## 2021-07-02 RX ADMIN — POTASSIUM & SODIUM PHOSPHATES POWDER PACK 280-160-250 MG 1 PACKET: 280-160-250 PACK at 15:39

## 2021-07-02 RX ADMIN — METOPROLOL TARTRATE 150 MG: 50 TABLET, FILM COATED ORAL at 08:17

## 2021-07-02 RX ADMIN — LOSARTAN POTASSIUM AND HYDROCHLOROTHIAZIDE 1 TABLET: 25; 100 TABLET ORAL at 19:11

## 2021-07-02 RX ADMIN — ACETAMINOPHEN 975 MG: 325 TABLET, FILM COATED ORAL at 19:11

## 2021-07-02 RX ADMIN — METOPROLOL TARTRATE 150 MG: 50 TABLET, FILM COATED ORAL at 19:11

## 2021-07-02 RX ADMIN — PANTOPRAZOLE SODIUM 40 MG: 40 INJECTION, POWDER, FOR SOLUTION INTRAVENOUS at 08:07

## 2021-07-02 RX ADMIN — HEPARIN SODIUM 5000 UNITS: 5000 INJECTION, SOLUTION INTRAVENOUS; SUBCUTANEOUS at 01:25

## 2021-07-02 RX ADMIN — POTASSIUM & SODIUM PHOSPHATES POWDER PACK 280-160-250 MG 1 PACKET: 280-160-250 PACK at 21:08

## 2021-07-02 RX ADMIN — AMLODIPINE BESYLATE 10 MG: 10 TABLET ORAL at 08:17

## 2021-07-02 RX ADMIN — ENOXAPARIN SODIUM 40 MG: 40 INJECTION SUBCUTANEOUS at 11:34

## 2021-07-02 ASSESSMENT — ACTIVITIES OF DAILY LIVING (ADL)
ADLS_ACUITY_SCORE: 18
ADLS_ACUITY_SCORE: 16
ADLS_ACUITY_SCORE: 16
ADLS_ACUITY_SCORE: 18

## 2021-07-02 NOTE — PROGRESS NOTES
IP Diabetes Management  Daily Note           Assessment and Plan:   HPI: Da is a 76 year old male with a history of HTN, CKD III, hx CAD s/p CABG, HLP, TIIDM, obesity, and history of colon cancer s/p lap right hemicolectomy with a new unresectable polyp who was admitted 6/22/21 for recurrent ileocolic bowel resection. Post op course has been complicated by recurrent ileus, requiring NGT placement and now initiating TPN.      Assessment:   1) Type II Diabetes Mellitus, suboptimally controlled (A1c 8.8%), with post operative hyperglycemia    2) Anticipated TPN-induced hyperglycemia     Plan:    -gently increase Lantus from 16 to 18 units daily   -regular insulin in TPN: 0.13 units per gram (27 units for 210 g dextrose)- this will STOP when bag runs out, has been reduced to half the previous hourly rate as of this AM.    -resume aspart 1:14g CHO with meals, snacks - please cover all intake   -continue aspart moderate intensity sliding scale AC/HS/0200, on continuous TPN.    -BG monitoring TID AC, HS, 0200   -when tolerating diet and is more medically stable, will assess for resumption in Metformin and Glipizide.   -hypoglycemia protocol   -carb counting protocol                 -diabetes education needs are not identified; he recently started Lantus insulin and is comfortable with injections.                  -on discharge, will recommend outpatient follow up with [facility] service within 2-3 weeks for reassessment.    Plan discussed with patient, and paged primary team.     Interval History and Assessment: interval glucose trend reviewed:   BG are remaining stable at high end of target on current regimen.   Diet advanced to clears/ NGT removed yesterday late afternoon. He has yet to receive carb coverage for PO intake which is likely contributing to some hyperglycemia.    TPN rate to be cut in half today, per RD, and will discontinue when this bag runs out (likely tomorrow AM)    Da previously endorsed  a desire/intent to go home on no insulin. This would depend on wether he is discharging on TPN, and level of PO intake. About a week prior to his admission, Lantus was started for poor diabetes control pre-op, and BG had improved. Anticipate oral agents plus Lantus will be plan for home in order to obtain good postoperative BG control.     Current nutritional intake and type: Orders Placed This Encounter      Diet      Low Fiber Diet  continuous TPN: 210 g on , and will taper to 105g in bag -7/3.     PTA Diabetes Regimen:   BG monitorin-3 times daily  Metformin IR 1000 mg BID WM  Glipizide IR 10mg BID WM  Lantus 20 units daily HS    Discharge Planning: TBD           Diabetes History:   Type of Diabetes: Type 2 Diabetes Mellitus, TPN/Enteral Feeding Induced Hyperglycemia  Lab Results   Component Value Date    A1C 8.8 2021    A1C 8.9 2021              Review of Systems:     The Review of Systems is negative other than noted in the Interval History.           Medications:     Current Facility-Administered Medications   Medication     [Held by provider] acetaminophen (TYLENOL) tablet 975 mg     amLODIPine (NORVASC) tablet 10 mg     [Held by provider] atorvastatin (LIPITOR) tablet 40 mg     benzocaine 20% (HURRICAINE/TOPEX) 20 % spray 0.5-1 mL     dextrose 10% infusion     glucose gel 15-30 g    Or     dextrose 50 % injection 25-50 mL    Or     glucagon injection 1 mg     enoxaparin ANTICOAGULANT (LOVENOX) injection 40 mg     [Held by provider] gabapentin (NEURONTIN) capsule 300 mg     heparin lock flush 10 UNIT/ML injection 5-10 mL     heparin lock flush 10 UNIT/ML injection 5-10 mL     hydrALAZINE (APRESOLINE) injection 10 mg     HYDROmorphone (DILAUDID) injection 0.2 mg    Or     HYDROmorphone (DILAUDID) injection 0.4 mg     insulin aspart (NovoLOG) injection (RAPID ACTING)     insulin aspart (NovoLOG) injection (RAPID ACTING)     insulin aspart (NovoLOG) injection (RAPID ACTING)     insulin  "aspart (NovoLOG) injection (RAPID ACTING)     insulin glargine (LANTUS PEN) injection 18 Units     lidocaine (LMX4) cream     lidocaine (LMX4) cream     lidocaine 1 % 0.1-1 mL     lidocaine 1 % 0.1-1 mL     lipids (INTRALIPID) 20 % infusion 250 mL     losartan-hydrochlorothiazide (HYZAAR) 100-25 MG per tablet 1 tablet     melatonin tablet 5 mg     metoprolol tartrate (LOPRESSOR) tablet 150 mg     naloxone (NARCAN) injection 0.2 mg    Or     naloxone (NARCAN) injection 0.4 mg    Or     naloxone (NARCAN) injection 0.2 mg    Or     naloxone (NARCAN) injection 0.4 mg     ondansetron (ZOFRAN-ODT) ODT tab 4 mg    Or     ondansetron (ZOFRAN) injection 4 mg     [Held by provider] oxyCODONE (ROXICODONE) tablet 5 mg     pantoprazole (PROTONIX) IV push injection 40 mg     parenteral nutrition - ADULT compounded formula     sodium chloride (PF) 0.9% PF flush 10-20 mL     sodium chloride (PF) 0.9% PF flush 3 mL     sodium chloride (PF) 0.9% PF flush 3 mL     sodium chloride 0.9% infusion     [Held by provider] tamsulosin (FLOMAX) capsule 0.4 mg            Physical Exam:    /59 (BP Location: Left arm)   Pulse 96   Temp 97.1  F (36.2  C) (Oral)   Resp 16   Ht 1.727 m (5' 8\")   Wt 97.2 kg (214 lb 3.2 oz)   SpO2 94%   BMI 32.57 kg/m    General: pleasant, in no distress, sitting in chair.  HEENT: normocephalic, atraumatic. Oral mucous membranes moist. NGT intervally removed.   Lungs: unlabored respiration  ABD: appears moderately distended.   Skin: dry, no obvious lesions  Lymp:  no LE edema   Mental status:  alert, awake, oriented to situation   Psych:  calm affect.            Data:     Recent Labs   Lab 07/02/21  0800 07/02/21  0742 07/02/21  0225 07/01/21  2154 07/01/21  1946 07/01/21  1541 07/01/21  1138 07/01/21  0711 06/30/21  0804 06/30/21  0804 06/29/21 0722 06/29/21 0722 06/28/21  0830 06/28/21  0830 06/27/21  0834 06/27/21 0834   GLC  --  204*  --   --   --   --   --  210*  --  188*  --  141*  --  166*  --  " 184*   *  --  170* 165* 170* 173* 187*  --    < >  --    < >  --    < >  --    < >  --     < > = values in this interval not displayed.     Lab Results   Component Value Date    WBC 5.8 06/29/2021    WBC 5.9 06/28/2021    WBC 11.0 06/26/2021    HGB 7.6 (L) 06/29/2021    HGB 7.3 (L) 06/28/2021    HGB 8.5 (L) 06/26/2021    HCT 24.4 (L) 06/29/2021    HCT 24.4 (L) 06/28/2021    HCT 26.8 (L) 06/26/2021    MCV 87 06/29/2021    MCV 88 06/28/2021    MCV 85 06/26/2021     06/29/2021     06/28/2021     06/26/2021     Lab Results   Component Value Date     07/01/2021     06/30/2021     06/29/2021    POTASSIUM 3.7 07/01/2021    POTASSIUM 3.4 07/01/2021    POTASSIUM 3.2 (L) 06/30/2021    CHLORIDE 111 (H) 07/01/2021    CHLORIDE 107 06/30/2021    CHLORIDE 108 06/29/2021    CO2 21 07/01/2021    CO2 23 06/30/2021    CO2 22 06/29/2021     (H) 07/01/2021     (H) 06/30/2021     (H) 06/29/2021     Lab Results   Component Value Date    BUN 28 07/01/2021    BUN 24 06/30/2021    BUN 21 06/29/2021     No results found for: TSH  Lab Results   Component Value Date    AST 12 06/30/2021    AST 12 06/29/2021    AST 20 06/18/2021    ALT 19 06/30/2021    ALT 20 06/29/2021    ALT 43 06/18/2021    ALKPHOS 86 06/30/2021    ALKPHOS 88 06/29/2021    ALKPHOS 92 06/18/2021     35 minutes spent on the date of the encounter doing chart review, history and exam, documentation and further activities per the note      Over 50% of my time on the unit was spent counseling the patient and/or coordinating care regarding acute hyperglycemia management.  See note for details.    To contact Endocrine Diabetes service:   From 8AM-4PM: page inpatient diabetes provider that is following the patient  For questions or updates from 4PM-8AM: page the diabetes job code for on call fellow: [phone]    Isabel Mckeon PA-C  Inpatient Diabetes Management Service  Pager [phone]

## 2021-07-02 NOTE — CONSULTS
Care Management Initial Consult    General Information  Assessment completed with: Care Team Member, TAMARA-chart review,    Type of CM/SW Visit: Initial Assessment    Primary Care Provider verified and updated as needed:     Readmission within the last 30 days:        Reason for Consult: discharge planning  Advance Care Planning:       General Information Comments: (Has 100% coverage for home TPN if needed at time of DC.)    Communication Assessment  Patient's communication style: spoken language (English or Bilingual)    Hearing Difficulty or Deaf: no   Wear Glasses or Blind: no    Cognitive  (From Chart Flow Sheet)  Cognitive/Neuro/Behavioral: WDL  Level of Consciousness: alert  Arousal Level: opens eyes spontaneously  Orientation: oriented x 4  Mood/Behavior: calm, cooperative, withdrawn, other (see comments)(sarcastic)  Best Language: 0 - No aphasia  Speech: clear, spontaneous, logical    Living Environment:   People in home: alone(had significant other,)     Current living Arrangements: house      Able to return to prior arrangements:  yes     Family/Social Support:  Care provided by: self  Provides care for:    Marital Status: Single             Description of Support System:    Supportive     Current Resources:   Patient receiving home care services:       Community Resources:    Equipment currently used at home: none  Supplies currently used at home:      Employment/Financial:  Employment Status:    Not assessed     Financial Concerns:   No Concerns      Lifestyle & Psychosocial Needs:  (From Chart Flow Sheet)        Socioeconomic History     Marital status: Single     Spouse name: Not on file     Number of children: Not on file     Years of education: Not on file     Highest education level: Not on file     Tobacco Use     Smoking status: Former Smoker     Quit date:      Years since quittin.5     Smokeless tobacco: Never Used   Substance and Sexual Activity     Alcohol use: Yes     Frequency: Monthly  or less     Drug use: Never       Functional Status:  Prior to admission patient needed assistance: Independent.     Mental Health Status:  No Concerns        Chemical Dependency Status:  See above flow sheet.        Values/Beliefs:  Spiritual, Cultural Beliefs, Christian Practices, Values that affect care: no             Additional Information:    Patient continues to progress appropriated this post operative period,  Patient TPN will be weaned to off today.  Patient does have 11% coverage for home TPN if needed at time of discharge.  The interdisciplinary team anticipates Mr. Amaya will be able to discharge to home and follow up in clinic    The inpatient Care Management Team will continue to follow and will assist with transition needs prn.    DEON Mack.S.SHELBY., R.N., P.H.N..  Care Coordinator     Pager   [facility]

## 2021-07-02 NOTE — PROGRESS NOTES
Colorectal Surgery Progress Note  7/2/2021     Subjective:  No acute events overnight, pain well controlled overnight. No nausea, vomiting. Passing gas and ample BMs overnight. Voiding without difficulty.  Patient agrees with plan for discharge tomorrow    Objective:  Temp:  [97.1  F (36.2  C)-99.4  F (37.4  C)] 97.1  F (36.2  C)  Pulse:  [] 96  Resp:  [16-18] 16  BP: (130-156)/(59-69) 136/59  SpO2:  [90 %-98 %] 94 %  I/O last 3 completed shifts:  In: 2201.59 [I.V.:450]  Out: 300 [Urine:300]    Gen: awake, NAD, converses well  HEENT: NCAT, EOMI.  CV: non cyanotic, extremities well perfused  Resp: unlabored breathing on RA, equal chest rise   Abd: soft less distended compared to prior. Non tender.  No guarding, no rigidity.  Incisions c/d/i.   Ext: no significant edema     UOP: 300mL output measured since midnight, >3x unmeasured voids  Stool: 3x charted bowel movements, likely more per patient    BMP  Recent Labs   Lab 07/02/21  0742 07/01/21  1828 07/01/21  0711 06/30/21  1954 06/30/21  0804 06/29/21  0722     --  139  --  139 138   POTASSIUM 3.8 3.7 3.4 3.2* 3.0* 3.5   CHLORIDE 109  --  111*  --  107 108   BOOGIE 7.4*  --  7.5*  --  8.0* 8.3*   CO2 21  --  21  --  23 22   BUN 24  --  28  --  24 21   CR 0.99  --  1.02  --  1.06 1.00   *  --  210*  --  188* 141*   MAG 2.2  --  2.0  --  2.1 2.3   PHOS 2.4*  --  2.2*  --  2.1* 2.8     CBC  Recent Labs   Lab 06/29/21  0722 06/28/21  0830 06/26/21  0418   WBC 5.8 5.9 11.0   RBC 2.81* 2.77* 3.14*   HGB 7.6* 7.3* 8.5*   HCT 24.4* 24.4* 26.8*   MCV 87 88 85   MCH 27.0 26.4* 27.1   MCHC 31.1* 29.9* 31.7   RDW 15.7* 15.5* 15.1*    340 311     A/P:   76M hx MI, s/p CABG, stage 3 renal disease, DM2, HTN, hx lap R hemicolectomy in 2019 for colon cancer (pT2N0; 0/23 LN+) and surveillance colonoscopy revealing recurrent unresectable polyp concerning for cancer recurrence (although path showed tubulovillous adenoma) now s/p transverse colectomy 6/22 c/b  ileus requiring repeat NGT placement, prolonged NPO requiring PICC/TPN.  NG-Tube removed, and tolerating full liquid diet.      -Pain control  -Continue with TPN, can discontinue if adequate PO intake   -Insulin managed by endocrine/diabetes service  -Continue PTA hypertensive medications (minus spironolactone)  -Advancing diet to Low-Fiber  -IV PPI  -Ppx: SQH   -Plan for discharge tomorrow    Seen and discussed with fellow, Dr. Cheng, who discussed with staff.     Adebayo Escobar  General Surgery, PGY1  [phone]

## 2021-07-02 NOTE — PLAN OF CARE
"Problem: Bowel Motility Impaired (Surgery Nonspecified)  Goal: Effective Bowel Elimination  Outcome: No Change     Problem: Pain (Surgery Nonspecified)  Goal: Acceptable Pain Control  Outcome: No Change     Problem: Postoperative Nausea and Vomiting (Surgery Nonspecified)  Goal: Nausea and Vomiting Relief  Outcome: No Change    Problem: Adult Inpatient Plan of Care  Goal: Plan of Care Review  Outcome: No Change    Patient admitted 6/22 onto colorectal services for laparoscopic transverse colectomy. TPN infusing into R arm 2L PICC line; TPN to be discontinued when current bag runs dry (see nursing comm orders), currently running at 38cc/h. Patient voiding spontaneously in urinal, having loose/watery BM's, denies nausea, NG tube removed 7/1 eves, no nausea/pain since. K/Mg/Phos protocols, replacing phos, recheck 7/4 AM. Blood sugar checks with meals and bedtime; patient had late breakfast today, did not finish tray until almost noon. Calls appropriately for standby assistance, walked halls a few times today with nursing staff. Diet advanced to low fiber, patient tolerating so far. Midline incision and lap sites w/ liquid bandage, ALEC and WNL. AOVSS on room air, /50 (BP Location: Left arm)   Pulse 86   Temp 97.9  F (36.6  C) (Oral)   Resp 16   Ht 1.727 m (5' 8\")   Wt 97.2 kg (214 lb 3.2 oz)   SpO2 94%   BMI 32.57 kg/m  . Continue with plan of care.   "

## 2021-07-02 NOTE — PLAN OF CARE
"BP (!) 145/62 (BP Location: Left arm)   Pulse 104   Temp 98.1  F (36.7  C) (Oral)   Resp 16   Ht 1.727 m (5' 8\")   Wt 97.2 kg (214 lb 3.2 oz)   SpO2 98%   BMI 32.57 kg/m      VSS ex mild htn on RA.  Pt denied SOB. Neuros intact.TPN/lipids infusing at 75mL/hr in purple lumen, red lumen hep locked.  Abdomen soft, multiple BMs today, flatus+. Voiding spontaneously. Denied pain during shift. BLE edema +1. Pulses + x4. Potassium replaced before shift and 18:00 recheck WNL. Potassium lab draw scheduled for AM. BG < 200 during shift and corrected with 1 unit. Will continue POC.  "

## 2021-07-02 NOTE — PROGRESS NOTES
CLINICAL NUTRITION SERVICES - BRIEF NOTE  *See RD note on 6/29 for nutrition assessment note details   Nutrition Prescription      Recommendations already ordered by Registered Dietitian (RD):  Wean off TPN (current bag will be last bag): cut rate in half, run until bag runs out.  Discussed with pharmacist, primary team, and Endo       Diet advanced to low fiber.  Primary team requests to wean off TPN today, see above.    INTERVENTIONS  Implementation  Collaboration with other providers and Parenteral Nutrition/IV Fluids - see above      Monitoring/Evaluation  Progress toward goals will be monitored and evaluated per protocol.     Holly Panchal RD, LD  Pager: [phone]  Units covered: 7C (all beds) and 5A (beds 5201 through 5211-2)

## 2021-07-02 NOTE — PLAN OF CARE
VSS. Voids spontaneously, adequate UOP, multiple watery BMs. Tolerating clear liquid diet. R PICC infusing TPN/lipids. Up SBA to bathroom. Denied pain. Midline incision, lap sites, liquid bandage, ALEC. B, 1 unit insulin given. Continue plan of care.

## 2021-07-03 LAB
ANION GAP SERPL CALCULATED.3IONS-SCNC: 8 MMOL/L (ref 3–14)
BUN SERPL-MCNC: 27 MG/DL (ref 7–30)
C DIFF TOX B STL QL: NEGATIVE
CALCIUM SERPL-MCNC: 7.8 MG/DL (ref 8.5–10.1)
CHLORIDE SERPL-SCNC: 108 MMOL/L (ref 94–109)
CO2 SERPL-SCNC: 20 MMOL/L (ref 20–32)
CREAT SERPL-MCNC: 1.04 MG/DL (ref 0.66–1.25)
ERYTHROCYTE [DISTWIDTH] IN BLOOD BY AUTOMATED COUNT: 15.6 % (ref 10–15)
GFR SERPL CREATININE-BSD FRML MDRD: 70 ML/MIN/{1.73_M2}
GLUCOSE BLDC GLUCOMTR-MCNC: 180 MG/DL (ref 70–99)
GLUCOSE BLDC GLUCOMTR-MCNC: 190 MG/DL (ref 70–99)
GLUCOSE BLDC GLUCOMTR-MCNC: 222 MG/DL (ref 70–99)
GLUCOSE BLDC GLUCOMTR-MCNC: 224 MG/DL (ref 70–99)
GLUCOSE BLDC GLUCOMTR-MCNC: 224 MG/DL (ref 70–99)
GLUCOSE BLDC GLUCOMTR-MCNC: 243 MG/DL (ref 70–99)
GLUCOSE SERPL-MCNC: 207 MG/DL (ref 70–99)
HCT VFR BLD AUTO: 23.6 % (ref 40–53)
HGB BLD-MCNC: 7.3 G/DL (ref 13.3–17.7)
MAGNESIUM SERPL-MCNC: 2.1 MG/DL (ref 1.6–2.3)
MCH RBC QN AUTO: 26.4 PG (ref 26.5–33)
MCHC RBC AUTO-ENTMCNC: 30.9 G/DL (ref 31.5–36.5)
MCV RBC AUTO: 86 FL (ref 78–100)
PHOSPHATE SERPL-MCNC: 3.4 MG/DL (ref 2.5–4.5)
PLATELET # BLD AUTO: 356 10E9/L (ref 150–450)
POTASSIUM SERPL-SCNC: 4 MMOL/L (ref 3.4–5.3)
RBC # BLD AUTO: 2.76 10E12/L (ref 4.4–5.9)
SODIUM SERPL-SCNC: 136 MMOL/L (ref 133–144)
SPECIMEN SOURCE: NORMAL
WBC # BLD AUTO: 8.8 10E9/L (ref 4–11)

## 2021-07-03 PROCEDURE — 120N000002 HC R&B MED SURG/OB UMMC

## 2021-07-03 PROCEDURE — 80048 BASIC METABOLIC PNL TOTAL CA: CPT | Performed by: PHYSICIAN ASSISTANT

## 2021-07-03 PROCEDURE — 250N000011 HC RX IP 250 OP 636: Performed by: PHYSICIAN ASSISTANT

## 2021-07-03 PROCEDURE — 99207 PR SERVICE NOT STAFFED W/SUPERV PROV: CPT | Mod: GC | Performed by: INTERNAL MEDICINE

## 2021-07-03 PROCEDURE — 999N001017 HC STATISTIC GLUCOSE BY METER IP

## 2021-07-03 PROCEDURE — 250N000013 HC RX MED GY IP 250 OP 250 PS 637: Performed by: SURGERY

## 2021-07-03 PROCEDURE — 85027 COMPLETE CBC AUTOMATED: CPT | Performed by: SURGERY

## 2021-07-03 PROCEDURE — 250N000013 HC RX MED GY IP 250 OP 250 PS 637: Performed by: PHYSICIAN ASSISTANT

## 2021-07-03 PROCEDURE — 36415 COLL VENOUS BLD VENIPUNCTURE: CPT | Performed by: PHYSICIAN ASSISTANT

## 2021-07-03 PROCEDURE — 250N000011 HC RX IP 250 OP 636: Performed by: SURGERY

## 2021-07-03 PROCEDURE — 87493 C DIFF AMPLIFIED PROBE: CPT | Performed by: SURGERY

## 2021-07-03 PROCEDURE — 250N000013 HC RX MED GY IP 250 OP 250 PS 637

## 2021-07-03 PROCEDURE — 250N000011 HC RX IP 250 OP 636: Performed by: STUDENT IN AN ORGANIZED HEALTH CARE EDUCATION/TRAINING PROGRAM

## 2021-07-03 PROCEDURE — 84100 ASSAY OF PHOSPHORUS: CPT | Performed by: PHYSICIAN ASSISTANT

## 2021-07-03 PROCEDURE — 83735 ASSAY OF MAGNESIUM: CPT | Performed by: PHYSICIAN ASSISTANT

## 2021-07-03 PROCEDURE — 258N000003 HC RX IP 258 OP 636: Performed by: SURGERY

## 2021-07-03 RX ORDER — DEXTROSE MONOHYDRATE, SODIUM CHLORIDE, AND POTASSIUM CHLORIDE 50; 1.49; 4.5 G/1000ML; G/1000ML; G/1000ML
INJECTION, SOLUTION INTRAVENOUS CONTINUOUS
Status: DISCONTINUED | OUTPATIENT
Start: 2021-07-03 | End: 2021-07-04

## 2021-07-03 RX ORDER — METOCLOPRAMIDE HYDROCHLORIDE 5 MG/ML
10 INJECTION INTRAMUSCULAR; INTRAVENOUS EVERY 6 HOURS SCHEDULED
Status: DISCONTINUED | OUTPATIENT
Start: 2021-07-03 | End: 2021-07-05 | Stop reason: DRUGHIGH

## 2021-07-03 RX ORDER — PSYLLIUM SEED (WITH DEXTROSE)
2 POWDER (GRAM) ORAL 2 TIMES DAILY
Status: DISCONTINUED | OUTPATIENT
Start: 2021-07-03 | End: 2021-07-05 | Stop reason: HOSPADM

## 2021-07-03 RX ORDER — ACETAMINOPHEN 325 MG/1
975 TABLET ORAL EVERY 6 HOURS PRN
Status: DISCONTINUED | OUTPATIENT
Start: 2021-07-03 | End: 2021-07-05 | Stop reason: HOSPADM

## 2021-07-03 RX ORDER — PANTOPRAZOLE SODIUM 40 MG/1
40 TABLET, DELAYED RELEASE ORAL
Status: DISCONTINUED | OUTPATIENT
Start: 2021-07-03 | End: 2021-07-05 | Stop reason: HOSPADM

## 2021-07-03 RX ADMIN — PANTOPRAZOLE SODIUM 40 MG: 40 TABLET, DELAYED RELEASE ORAL at 10:01

## 2021-07-03 RX ADMIN — Medication 2 WAFER: at 19:36

## 2021-07-03 RX ADMIN — METOCLOPRAMIDE HYDROCHLORIDE 10 MG: 5 INJECTION INTRAMUSCULAR; INTRAVENOUS at 13:37

## 2021-07-03 RX ADMIN — ONDANSETRON 4 MG: 2 INJECTION INTRAMUSCULAR; INTRAVENOUS at 10:18

## 2021-07-03 RX ADMIN — ENOXAPARIN SODIUM 40 MG: 40 INJECTION SUBCUTANEOUS at 10:01

## 2021-07-03 RX ADMIN — TAMSULOSIN HYDROCHLORIDE 0.4 MG: 0.4 CAPSULE ORAL at 17:26

## 2021-07-03 RX ADMIN — LOSARTAN POTASSIUM AND HYDROCHLOROTHIAZIDE 1 TABLET: 25; 100 TABLET ORAL at 19:30

## 2021-07-03 RX ADMIN — POTASSIUM CHLORIDE, DEXTROSE MONOHYDRATE AND SODIUM CHLORIDE: 150; 5; 450 INJECTION, SOLUTION INTRAVENOUS at 10:30

## 2021-07-03 RX ADMIN — Medication 5 ML: at 03:24

## 2021-07-03 RX ADMIN — AMLODIPINE BESYLATE 10 MG: 10 TABLET ORAL at 09:40

## 2021-07-03 RX ADMIN — METOPROLOL TARTRATE 150 MG: 50 TABLET, FILM COATED ORAL at 19:30

## 2021-07-03 RX ADMIN — POTASSIUM CHLORIDE, DEXTROSE MONOHYDRATE AND SODIUM CHLORIDE: 150; 5; 450 INJECTION, SOLUTION INTRAVENOUS at 21:21

## 2021-07-03 RX ADMIN — Medication 5 ML: at 13:43

## 2021-07-03 RX ADMIN — METOCLOPRAMIDE HYDROCHLORIDE 10 MG: 5 INJECTION INTRAMUSCULAR; INTRAVENOUS at 17:23

## 2021-07-03 ASSESSMENT — ACTIVITIES OF DAILY LIVING (ADL)
ADLS_ACUITY_SCORE: 16
ADLS_ACUITY_SCORE: 18

## 2021-07-03 NOTE — PLAN OF CARE
"/58 (BP Location: Left arm)   Pulse 92   Temp 97.9  F (36.6  C) (Oral)   Resp 16   Ht 1.727 m (5' 8\")   Wt 97.2 kg (214 lb 3.2 oz)   SpO2 96%   BMI 32.57 kg/m       VSS on RA and no report of SOB. Pt. calm and cooperative, neuros intact. PICC dressing CDI. TPN infusing at 38 mL/hr in purple lumen, ordered to hold PM lipid dose. Red lumen hep locked. TPN will run until empty tonight. Voiding spontaneously and multiple BMs during day, bowel sounds + x4. Minor BLE edema +1 noted. Midline incision and lap sites CDI with liquid bandage and ALEC.  Phosphorous replaced during shift with recheck on AM 7/4. BG corrected with 1-2 units during shift. Pt. tolerating low fiber diet well.  Pt. is SBA and uses call light appropriately. Will continue POC.  "

## 2021-07-03 NOTE — PROVIDER NOTIFICATION
Colorectal MD paged regarding lipid order with TPN running at half rate until empty. Ordered to hold dose tonight and recheck need for lipid dose in the AM.

## 2021-07-03 NOTE — PROGRESS NOTES
IP Diabetes Management  Daily Note           Assessment and Plan:   HPI: Da is a 76 year old male with a history of HTN, CKD III, hx CAD s/p CABG, HLP, TIIDM, obesity, and history of colon cancer s/p lap right hemicolectomy with a new unresectable polyp who was admitted 6/22/21 for recurrent ileocolic bowel resection. Post op course has been complicated by recurrent ileus, requiring NGT placement and now initiating TPN.      Assessment:   1) Type II Diabetes Mellitus, suboptimally controlled (A1c 8.8%), with post operative hyperglycemia    2) TPN-induced hyperglycemia     TPN weaned off on 7/2/2021. Diet advanced to low fiber diet    Plan:    -Gently increase Lantus from 18 to 22 units daily   - Increase aspart 1:12g CHO with meals, snacks - please cover all intake   -continue aspart moderate intensity sliding scale AC/HS/0200, on continuous TPN.    -BG monitoring TID AC, HS, 0200   -when tolerating diet and is more medically stable, will assess for resumption in Metformin and Glipizide.   -hypoglycemia protocol   -carb counting protocol                 -diabetes education needs are not identified; he recently started Lantus insulin and is comfortable with injections.                  -on discharge, will recommend outpatient follow up with [facility] Endocrinology service within 2-3 weeks for reassessment.        Interval History and Assessment:   interval glucose trend reviewed:     Recent Labs   Lab 07/03/21  0852 07/03/21  0608 07/03/21  0524 07/03/21  0202 07/02/21  2141 07/02/21  1723 07/02/21  1355 07/02/21  0742 07/02/21  0742 07/01/21  0711 07/01/21  0711 06/30/21  0804 06/30/21  0804 06/29/21  0722 06/29/21  0722 06/28/21  0830 06/28/21  0830   GLC  --  207*  --   --   --   --   --   --  204*  --  210*  --  188*  --  141*  --  166*   *  --  190* 180* 190* 193* 185*   < >  --    < >  --    < >  --    < >  --    < >  --     < > = values in this interval not displayed.     BG were fairly controlled  "overnight in the 180s-190s.    AM blood glucose in the 200s  TPN weaned off on 2021. Diet advanced to low fiber diet        Per previous note:  \"Da previously endorsed a desire/intent to go home on no insulin. This would depend on wether he is discharging on TPN, and level of PO intake. About a week prior to his admission, Lantus was started for poor diabetes control pre-op, and BG had improved. Anticipate oral agents plus Lantus will be plan for home in order to obtain good postoperative BG control. \"    Current nutritional intake and type: Orders Placed This Encounter      Diet      Low Fiber Diet  continuous TPN: 210 g on , and will taper to 105g in bag -7/3.     PTA Diabetes Regimen:   BG monitorin-3 times daily  Metformin IR 1000 mg BID WM  Glipizide IR 10mg BID WM  Lantus 20 units daily HS    Discharge Planning: TBD           Diabetes History:   Type of Diabetes: Type 2 Diabetes Mellitus, TPN/Enteral Feeding Induced Hyperglycemia  Lab Results   Component Value Date    A1C 8.8 2021    A1C 8.9 2021              Review of Systems:     The Review of Systems is negative other than noted in the Interval History.           Medications:     Current Facility-Administered Medications   Medication     acetaminophen (TYLENOL) tablet 975 mg     amLODIPine (NORVASC) tablet 10 mg     atorvastatin (LIPITOR) tablet 40 mg     dextrose 10% infusion     glucose gel 15-30 g    Or     dextrose 50 % injection 25-50 mL    Or     glucagon injection 1 mg     enoxaparin ANTICOAGULANT (LOVENOX) injection 40 mg     heparin lock flush 10 UNIT/ML injection 5-10 mL     heparin lock flush 10 UNIT/ML injection 5-10 mL     hydrALAZINE (APRESOLINE) injection 10 mg     insulin aspart (NovoLOG) injection (RAPID ACTING)     insulin aspart (NovoLOG) injection (RAPID ACTING)     insulin aspart (NovoLOG) injection (RAPID ACTING)     insulin aspart (NovoLOG) injection (RAPID ACTING)     [START ON 2021] insulin " "glargine (LANTUS PEN) injection 20 Units     lidocaine (LMX4) cream     lidocaine (LMX4) cream     lidocaine 1 % 0.1-1 mL     lidocaine 1 % 0.1-1 mL     losartan-hydrochlorothiazide (HYZAAR) 100-25 MG per tablet 1 tablet     melatonin tablet 5 mg     metoprolol tartrate (LOPRESSOR) tablet 150 mg     naloxone (NARCAN) injection 0.2 mg    Or     naloxone (NARCAN) injection 0.4 mg    Or     naloxone (NARCAN) injection 0.2 mg    Or     naloxone (NARCAN) injection 0.4 mg     ondansetron (ZOFRAN-ODT) ODT tab 4 mg    Or     ondansetron (ZOFRAN) injection 4 mg     oxyCODONE (ROXICODONE) tablet 5 mg     pantoprazole (PROTONIX) EC tablet 40 mg     potassium & sodium phosphates (NEUTRA-PHOS) Packet 1 packet     sodium chloride (PF) 0.9% PF flush 10-20 mL     sodium chloride (PF) 0.9% PF flush 3 mL     sodium chloride (PF) 0.9% PF flush 3 mL     tamsulosin (FLOMAX) capsule 0.4 mg       Vitals:    BP (!) 140/68 (BP Location: Left arm)   Pulse 88   Temp 96.9  F (36.1  C) (Oral)   Resp 20   Ht 1.727 m (5' 8\")   Wt 97.2 kg (214 lb 3.2 oz)   SpO2 99%   BMI 32.57 kg/m    General: no acute distress  Mental statisu; awake  Resp: no acute distress          Data:       Lab Results   Component Value Date    WBC 5.8 06/29/2021    WBC 5.9 06/28/2021    WBC 11.0 06/26/2021    HGB 7.6 (L) 06/29/2021    HGB 7.3 (L) 06/28/2021    HGB 8.5 (L) 06/26/2021    HCT 24.4 (L) 06/29/2021    HCT 24.4 (L) 06/28/2021    HCT 26.8 (L) 06/26/2021    MCV 87 06/29/2021    MCV 88 06/28/2021    MCV 85 06/26/2021     06/29/2021     06/28/2021     06/26/2021     Lab Results   Component Value Date     07/03/2021     07/02/2021     07/01/2021    POTASSIUM 4.0 07/03/2021    POTASSIUM 3.8 07/02/2021    POTASSIUM 3.7 07/01/2021    CHLORIDE 108 07/03/2021    CHLORIDE 109 07/02/2021    CHLORIDE 111 (H) 07/01/2021    CO2 20 07/03/2021    CO2 21 07/02/2021    CO2 21 07/01/2021     (H) 07/03/2021     (H) 07/02/2021 "     (H) 07/01/2021     Lab Results   Component Value Date    BUN 27 07/03/2021    BUN 24 07/02/2021    BUN 28 07/01/2021     No results found for: TSH  Lab Results   Component Value Date    AST 12 06/30/2021    AST 12 06/29/2021    AST 20 06/18/2021    ALT 19 06/30/2021    ALT 20 06/29/2021    ALT 43 06/18/2021    ALKPHOS 86 06/30/2021    ALKPHOS 88 06/29/2021    ALKPHOS 92 06/18/2021       Macey Iqbal  PGY 5  Fellow  Division of Endocrinology    Attending tie-in note  I discussed about the patient with endocrine fellow Dr. Iqbal. Agree above note and plan.       Dena Gregg MD  Staff Physician  Endocrinology and Metabolism  [facility]  License: [id number]  Pager: [phone]

## 2021-07-03 NOTE — PLAN OF CARE
AVSS.  98% RA.    Pt resting well between cares.  Denies pain, denies nausea.  Declined scheduled tylenol.  R PICC - TPN done at 0330, HLd.  02 , covered with sliding scale.  Watch BG closely (checked at 7216=429).   Midline + lap sites with dermabond, c/d/intact.  Hyper BS.  Up with SBA.  Void spont, BM x1.  BLE edema.    Cont with POC.

## 2021-07-03 NOTE — PROGRESS NOTES
Colorectal Surgery Progress Note  7/3/2021     Subjective:  Had some nausea this morning, no pain. Otherwise tolerating diet, still with multiple BMs daily, loose.    Objective:  Temp:  [97.8  F (36.6  C)-98.2  F (36.8  C)] 97.8  F (36.6  C)  Pulse:  [82-92] 82  Resp:  [16-18] 18  BP: (106-143)/(50-66) 143/63  SpO2:  [94 %-98 %] 96 %  I/O last 3 completed shifts:  In: 1573.62 [P.O.:580]  Out: -     Gen NAD  NLB on RA  Abd soft, nontender, incisions clean    A/P:   76M  s/p transverse colectomy 6/22complicated by postoperative ileus, now resolved.     -Pain control  -Insulin managed by endocrine/diabetes service  -Continue PTA hypertensive medications (minus spironolactone)  -Continue low fiber diet, monitor nausea.   -IV PPI  -Ppx: lovenox, discharge with lovenox teachings.    -Watch another day, pending stool PCR for C diff, resolution of nausea and tolerance of diet.      Oscar Muller MD  Division of Colon and Rectal Surgery  [facility]  [phone]

## 2021-07-03 NOTE — PLAN OF CARE
"Problem: Bowel Motility Impaired (Surgery Nonspecified)  Goal: Effective Bowel Elimination  Outcome: No Change     Problem: Infection (Surgery Nonspecified)  Goal: Absence of Infection Signs and Symptoms  Outcome: No Change     Problem: Pain (Surgery Nonspecified)  Goal: Acceptable Pain Control  Outcome: No Change     Problem: Postoperative Nausea and Vomiting (Surgery Nonspecified)  Goal: Nausea and Vomiting Relief  Outcome: No Change  Intervention: Prevent or Manage Nausea and Vomiting  Recent Flowsheet Documentation  Taken 7/3/2021 0800 by Enedina Lemus RN  Nausea/Vomiting Interventions:    antiemetic    aromatherapy utilized     Problem: Adult Inpatient Plan of Care  Goal: Optimal Comfort and Wellbeing  Outcome: No Change  Intervention: Provide Person-Centered Care  Recent Flowsheet Documentation  Taken 7/3/2021 0800 by Enedina Lemus RN  Trust Relationship/Rapport:    care explained    choices provided     Patient admitted 6/22 onto colorectal services for laparoscopic transverse colectomy. 2L PICC line in R arm heparin locked and infusing fluids; TPN stopped early morning with orders to discontinue after yesterday's dose. Blood sugars checked at meal and bedtimes, but patient declined breakfast and was made NPO today, so carb coverage was held. Sliding scale for blood sugars. Patient with sudden onset nausea after taking AM meds; page sent to MD, who made patient NPO, ordered stool sample, and scheduled reglan. Had 1 BM today which was collected as sample and sent to lab; C. Diff rule out pending due to multiple BM's past few days. Voiding spontaneously without difficulty. Up ad eryn in room, calls for assistance as needed. AOVSS on room air, BP (!) 140/68 (BP Location: Left arm)   Pulse 88   Temp 96.9  F (36.1  C) (Oral)   Resp 20   Ht 1.727 m (5' 8\")   Wt 97.2 kg (214 lb 3.2 oz)   SpO2 99%   BMI 32.57 kg/m  . Flat affect and sad mood this morning, patient states he does not feel well, " "something feels \"off.\" MD aware of patient's discomfort. Continue with plan of care. Per Dr. Muller, \"ok if patient wants to sip some fluids tonight\" despite NPO status.   "

## 2021-07-04 ENCOUNTER — APPOINTMENT (OUTPATIENT)
Dept: OCCUPATIONAL THERAPY | Facility: CLINIC | Age: 76
DRG: 330 | End: 2021-07-04
Attending: SURGERY
Payer: MEDICARE

## 2021-07-04 LAB
GLUCOSE BLDC GLUCOMTR-MCNC: 199 MG/DL (ref 70–99)
GLUCOSE BLDC GLUCOMTR-MCNC: 211 MG/DL (ref 70–99)
GLUCOSE BLDC GLUCOMTR-MCNC: 217 MG/DL (ref 70–99)
GLUCOSE BLDC GLUCOMTR-MCNC: 223 MG/DL (ref 70–99)
GLUCOSE BLDC GLUCOMTR-MCNC: 231 MG/DL (ref 70–99)
MAGNESIUM SERPL-MCNC: 2.1 MG/DL (ref 1.6–2.3)
PHOSPHATE SERPL-MCNC: 2.2 MG/DL (ref 2.5–4.5)
PLATELET # BLD AUTO: 335 10E9/L (ref 150–450)
POTASSIUM SERPL-SCNC: 3.8 MMOL/L (ref 3.4–5.3)

## 2021-07-04 PROCEDURE — 83735 ASSAY OF MAGNESIUM: CPT | Performed by: SURGERY

## 2021-07-04 PROCEDURE — 99207 PR SERVICE NOT STAFFED W/SUPERV PROV: CPT | Mod: GC | Performed by: INTERNAL MEDICINE

## 2021-07-04 PROCEDURE — 250N000013 HC RX MED GY IP 250 OP 250 PS 637: Performed by: SURGERY

## 2021-07-04 PROCEDURE — 84100 ASSAY OF PHOSPHORUS: CPT | Performed by: SURGERY

## 2021-07-04 PROCEDURE — 36592 COLLECT BLOOD FROM PICC: CPT | Performed by: SURGERY

## 2021-07-04 PROCEDURE — 36592 COLLECT BLOOD FROM PICC: CPT | Performed by: STUDENT IN AN ORGANIZED HEALTH CARE EDUCATION/TRAINING PROGRAM

## 2021-07-04 PROCEDURE — 250N000009 HC RX 250: Performed by: PHYSICIAN ASSISTANT

## 2021-07-04 PROCEDURE — 258N000003 HC RX IP 258 OP 636: Performed by: PHYSICIAN ASSISTANT

## 2021-07-04 PROCEDURE — 250N000013 HC RX MED GY IP 250 OP 250 PS 637

## 2021-07-04 PROCEDURE — 85049 AUTOMATED PLATELET COUNT: CPT | Performed by: STUDENT IN AN ORGANIZED HEALTH CARE EDUCATION/TRAINING PROGRAM

## 2021-07-04 PROCEDURE — 258N000003 HC RX IP 258 OP 636: Performed by: SURGERY

## 2021-07-04 PROCEDURE — 97535 SELF CARE MNGMENT TRAINING: CPT | Mod: GO

## 2021-07-04 PROCEDURE — 97530 THERAPEUTIC ACTIVITIES: CPT | Mod: GO

## 2021-07-04 PROCEDURE — 250N000013 HC RX MED GY IP 250 OP 250 PS 637: Performed by: PHYSICIAN ASSISTANT

## 2021-07-04 PROCEDURE — 999N001017 HC STATISTIC GLUCOSE BY METER IP

## 2021-07-04 PROCEDURE — 250N000011 HC RX IP 250 OP 636: Performed by: SURGERY

## 2021-07-04 PROCEDURE — 84132 ASSAY OF SERUM POTASSIUM: CPT | Performed by: SURGERY

## 2021-07-04 PROCEDURE — 250N000011 HC RX IP 250 OP 636: Performed by: PHYSICIAN ASSISTANT

## 2021-07-04 PROCEDURE — 120N000002 HC R&B MED SURG/OB UMMC

## 2021-07-04 RX ADMIN — PANTOPRAZOLE SODIUM 40 MG: 40 TABLET, DELAYED RELEASE ORAL at 08:43

## 2021-07-04 RX ADMIN — METOPROLOL TARTRATE 150 MG: 50 TABLET, FILM COATED ORAL at 20:57

## 2021-07-04 RX ADMIN — Medication 2 WAFER: at 08:45

## 2021-07-04 RX ADMIN — ATORVASTATIN CALCIUM 40 MG: 40 TABLET, FILM COATED ORAL at 08:43

## 2021-07-04 RX ADMIN — AMLODIPINE BESYLATE 10 MG: 10 TABLET ORAL at 08:43

## 2021-07-04 RX ADMIN — LOSARTAN POTASSIUM AND HYDROCHLOROTHIAZIDE 1 TABLET: 25; 100 TABLET ORAL at 20:57

## 2021-07-04 RX ADMIN — METOCLOPRAMIDE HYDROCHLORIDE 10 MG: 5 INJECTION INTRAMUSCULAR; INTRAVENOUS at 18:19

## 2021-07-04 RX ADMIN — METOCLOPRAMIDE HYDROCHLORIDE 10 MG: 5 INJECTION INTRAMUSCULAR; INTRAVENOUS at 06:22

## 2021-07-04 RX ADMIN — METOPROLOL TARTRATE 150 MG: 50 TABLET, FILM COATED ORAL at 08:43

## 2021-07-04 RX ADMIN — METOCLOPRAMIDE HYDROCHLORIDE 10 MG: 5 INJECTION INTRAMUSCULAR; INTRAVENOUS at 00:09

## 2021-07-04 RX ADMIN — METOCLOPRAMIDE HYDROCHLORIDE 10 MG: 5 INJECTION INTRAMUSCULAR; INTRAVENOUS at 12:02

## 2021-07-04 RX ADMIN — ENOXAPARIN SODIUM 40 MG: 40 INJECTION SUBCUTANEOUS at 09:48

## 2021-07-04 RX ADMIN — POTASSIUM CHLORIDE, DEXTROSE MONOHYDRATE AND SODIUM CHLORIDE: 150; 5; 450 INJECTION, SOLUTION INTRAVENOUS at 09:48

## 2021-07-04 RX ADMIN — TAMSULOSIN HYDROCHLORIDE 0.4 MG: 0.4 CAPSULE ORAL at 18:20

## 2021-07-04 RX ADMIN — SODIUM PHOSPHATE, MONOBASIC, MONOHYDRATE 15 MMOL: 276; 142 INJECTION, SOLUTION INTRAVENOUS at 17:16

## 2021-07-04 RX ADMIN — Medication 5 ML: at 08:51

## 2021-07-04 RX ADMIN — Medication 10 ML: at 21:53

## 2021-07-04 ASSESSMENT — ACTIVITIES OF DAILY LIVING (ADL)
ADLS_ACUITY_SCORE: 18
ADLS_ACUITY_SCORE: 16
ADLS_ACUITY_SCORE: 18
ADLS_ACUITY_SCORE: 18

## 2021-07-04 NOTE — PROGRESS NOTES
IP Diabetes Management  Daily Note           Assessment and Plan:   HPI: Da is a 76 year old male with a history of HTN, CKD III, hx CAD s/p CABG, HLP, TIIDM, obesity, and history of colon cancer s/p lap right hemicolectomy with a new unresectable polyp who was admitted 6/22/21 for recurrent ileocolic bowel resection. Post op course has been complicated by recurrent ileus, requiring NGT placement and now initiating TPN.      Assessment:   1) Type II Diabetes Mellitus, suboptimally controlled (A1c 8.8%), with post operative hyperglycemia    2) TPN-induced hyperglycemia     TPN weaned off on 7/2/2021. Diet advanced to low fiber diet  BG were stable in the 200s overnight  Our plan on 7/3/2021 was to increase G to 22u from 18u  Pt did not receive any Glargine on 7/3/2021 due to order error . Writer increased Glargine to 22 u yesterday, however it was automatically timed to 7/4/2021 instead.  Writer explained and apologize to patient today.     Plan:    -Gently increase Lantus from 18 to 22 units daily today   -Continue aspart 1:12g CHO with meals, snacks   -continue aspart moderate intensity sliding scale AC/HS/0200.    -BG monitoring TID AC, HS, 0200   -when tolerating diet and is more medically stable, will assess for resumption in Metformin and Glipizide.   -hypoglycemia protocol   -carb counting protocol                 -diabetes education needs are not identified; he recently started Lantus insulin and is comfortable with injections.                  -on discharge, will recommend outpatient follow up with [facility] Endocrinology service within 2-3 weeks for reassessment.        Interval History and Assessment:   interval glucose trend reviewed:     Recent Labs   Lab 07/04/21  1132 07/04/21  0747 07/04/21  0207 07/03/21  2114 07/03/21  1717 07/03/21  1227 07/03/21  0608 07/03/21  0608 07/02/21  0742 07/02/21  0742 07/01/21  0711 07/01/21  0711 06/30/21  0804 06/30/21  0804 06/29/21  0722 06/29/21  0722  "21  0830 21  0830   GLC  --   --   --   --   --   --   --  207*  --  204*  --  210*  --  188*  --  141*  --  166*   * 217* 211* 222* 224* 243*   < >  --    < >  --    < >  --    < >  --    < >  --    < >  --     < > = values in this interval not displayed.     BG were stable in the 200s overnight  Patient did not receive Glargine on 7/3/2021  TPN weaned off on 2021. Diet advanced to low fiber diet         HPI  Per previous note:  \"Da previously endorsed a desire/intent to go home on no insulin. This would depend on wether he is discharging on TPN, and level of PO intake. About a week prior to his admission, Lantus was started for poor diabetes control pre-op, and BG had improved. Anticipate oral agents plus Lantus will be plan for home in order to obtain good postoperative BG control. \"      Current nutritional intake and type: Orders Placed This Encounter      Diet      Low Fiber Diet    PTA Diabetes Regimen:   BG monitorin-3 times daily  Metformin IR 1000 mg BID WM  Glipizide IR 10mg BID WM  Lantus 20 units daily HS    Discharge Planning: TBD            Subjective:   Patient did not eat much food yesterday, as the hospital foods were not appetizing.  No recent vomiing.   Continues to experience multiple loose stools      Vitals:    /55   Pulse 93   Temp 98.3  F (36.8  C) (Temporal)   Resp 20   Ht 1.727 m (5' 8\")   Wt 97.2 kg (214 lb 3.2 oz)   SpO2 94%   BMI 32.57 kg/m      GENERAL: Comfortable, sitting in bed  EYES: Eyes grossly normal to inspection.  No discharge or erythema, or obvious scleral/conjunctival abnormalities.  RESP: No audible wheeze, cough, or visible cyanosis.  No visible retractions or increased work of breathing.    SKIN: Visible skin clear. No significant rash, abnormal pigmentation or lesions.            Data:       Lab Results   Component Value Date    WBC 8.8 2021    WBC 5.8 2021    WBC 5.9 2021    HGB 7.3 (L) 2021    HGB 7.6 " (L) 06/29/2021    HGB 7.3 (L) 06/28/2021    HCT 23.6 (L) 07/03/2021    HCT 24.4 (L) 06/29/2021    HCT 24.4 (L) 06/28/2021    MCV 86 07/03/2021    MCV 87 06/29/2021    MCV 88 06/28/2021     07/04/2021     07/03/2021     06/29/2021     Lab Results   Component Value Date     07/03/2021     07/02/2021     07/01/2021    POTASSIUM 3.8 07/04/2021    POTASSIUM 4.0 07/03/2021    POTASSIUM 3.8 07/02/2021    CHLORIDE 108 07/03/2021    CHLORIDE 109 07/02/2021    CHLORIDE 111 (H) 07/01/2021    CO2 20 07/03/2021    CO2 21 07/02/2021    CO2 21 07/01/2021     (H) 07/03/2021     (H) 07/02/2021     (H) 07/01/2021     Lab Results   Component Value Date    BUN 27 07/03/2021    BUN 24 07/02/2021    BUN 28 07/01/2021     No results found for: TSH  Lab Results   Component Value Date    AST 12 06/30/2021    AST 12 06/29/2021    AST 20 06/18/2021    ALT 19 06/30/2021    ALT 20 06/29/2021    ALT 43 06/18/2021    ALKPHOS 86 06/30/2021    ALKPHOS 88 06/29/2021    ALKPHOS 92 06/18/2021       Macey Iqbal  PGY 5  Fellow  Division of Endocrinology    Attending tie-in note  I discussed about the patient with endocrine fellow Dr. Iqbal. Agree above note and plan.       Dena Gregg MD  Staff Physician  Endocrinology and Metabolism  [facility]  License: [id number]  Pager: [phone]

## 2021-07-04 NOTE — PLAN OF CARE
POD # 11 s/p Laparoscopic transverse colectomy, lysis of adhesions. Patient denies pain, no nausea. PICC line infusing. Pt reports positive flatus, had 2 BMs during the shift. Voiding and ambulating without difficulty.

## 2021-07-04 NOTE — PLAN OF CARE
6/22 s/p transverse colectomy - complicated by postoperative ileus, now resolved.     AVSS.  99% RA.  A&Ox4.  Pt resting well overnight.  Denies pain overnight, denies nausea.  NPO x meds, ice chips.  Scheduled reglan with relief.  PICC with MIV@75/hr.  02 BS check, covered per sliding scale.  Up with SBA to BR - void spont, loose stools continue.  Abd distended.  Abd midline + lap sites with dermabond, c/d/intact.   Cont with POC.

## 2021-07-04 NOTE — PROGRESS NOTES
"Colorectal Surgery Progress Note  7/4/2021     Subjective:  Still experiencing multiple loose BMs per shift (which he describes as \"water\").  When asked if \"today is the day [for discharge]\" he responded with \"probably not.\" He believes the hospital food is making him sick, but denies recent vomiting.  Ambulating without difficulty and denies feeling weak or fatigued.      Objective:  Temp:  [95.7  F (35.4  C)-98.5  F (36.9  C)] 98.3  F (36.8  C)  Pulse:  [80-93] 93  Resp:  [20] 20  BP: (110-149)/(54-70) 131/55  SpO2:  [94 %-99 %] 94 %  I/O last 3 completed shifts:  In: 1300 [P.O.:100; I.V.:1200]  Out: 875 [Emesis/NG output:350; Stool:525]    Gen NAD  NLB on RA  Abd soft, nontender, incisions clean    A/P:   76M  s/p transverse colectomy 6/22complicated by postoperative ileus, now resolved.  Continued loose BMs, confirmed Cdiff negative    -Pain control  -Add Psylium to thicken stools.  -Insulin managed by endocrine/diabetes service  -Continue PTA hypertensive medications (minus spironolactone)  -Continue low fiber diet, monitor nausea.   -IV PPI  -Ppx: lovenox, discharge with lovenox teachings.  -Discharge tomorrow      Oscar Muller MD  Division of Colon and Rectal Surgery  [facility]  [phone]      "

## 2021-07-04 NOTE — PLAN OF CARE
"Problem: Bowel Motility Impaired (Surgery Nonspecified)  Goal: Effective Bowel Elimination  Outcome: No Change     Problem: Pain (Surgery Nonspecified)  Goal: Acceptable Pain Control  Outcome: No Change     Problem: Postoperative Nausea and Vomiting (Surgery Nonspecified)  Goal: Nausea and Vomiting Relief  Outcome: No Change     Problem: Adult Inpatient Plan of Care  Goal: Optimal Comfort and Wellbeing  Outcome: No Change  Intervention: Provide Person-Centered Care  Recent Flowsheet Documentation  Taken 7/4/2021 0800 by Enedina Lemus RN  Trust Relationship/Rapport:    care explained    choices provided    questions answered    thoughts/feelings acknowledged    Patient admitted 6/22 for laparoscopic transverse colectomy. 2L PICC line in R arm heparin locked and infusing fluids, rate decreased today from 75cc/h to 40cc/h. PICC line caps changed today. Blood sugars checked at meal and bedtimes; patient declined to order any meals today, did have chicken broth and tolerated it without issue. Advanced to low fiber diet today from NPO. Denies nausea and pain today. Abdomen distended. Passing flatus, had 2 liquid BM's today; voiding spontaneously without difficulty. Up ad eryn in room, calls appropriately for assistance. Patient is alert and oriented, VSS, /55   Pulse 93   Temp 98.3  F (36.8  C) (Temporal)   Resp 20   Ht 1.727 m (5' 8\")   Wt 97.2 kg (214 lb 3.2 oz)   SpO2 94%   BMI 32.57 kg/m  . Continue with plan of care. Encourage patient to eat and to ambulate.  "

## 2021-07-05 ENCOUNTER — APPOINTMENT (OUTPATIENT)
Dept: OCCUPATIONAL THERAPY | Facility: CLINIC | Age: 76
DRG: 330 | End: 2021-07-05
Attending: SURGERY
Payer: MEDICARE

## 2021-07-05 VITALS
DIASTOLIC BLOOD PRESSURE: 60 MMHG | HEART RATE: 91 BPM | RESPIRATION RATE: 16 BRPM | OXYGEN SATURATION: 93 % | WEIGHT: 214.2 LBS | HEIGHT: 68 IN | BODY MASS INDEX: 32.46 KG/M2 | TEMPERATURE: 97.7 F | SYSTOLIC BLOOD PRESSURE: 137 MMHG

## 2021-07-05 LAB
INR PPP: 1.15 (ref 0.86–1.14)
MAGNESIUM SERPL-MCNC: 2 MG/DL (ref 1.6–2.3)
PHOSPHATE SERPL-MCNC: 2.8 MG/DL (ref 2.5–4.5)
PREALB SERPL IA-MCNC: 9 MG/DL (ref 15–45)

## 2021-07-05 PROCEDURE — 97535 SELF CARE MNGMENT TRAINING: CPT | Mod: GO

## 2021-07-05 PROCEDURE — 250N000013 HC RX MED GY IP 250 OP 250 PS 637: Performed by: SURGERY

## 2021-07-05 PROCEDURE — 85610 PROTHROMBIN TIME: CPT | Performed by: SURGERY

## 2021-07-05 PROCEDURE — 84134 ASSAY OF PREALBUMIN: CPT | Performed by: SURGERY

## 2021-07-05 PROCEDURE — 36592 COLLECT BLOOD FROM PICC: CPT | Performed by: SURGERY

## 2021-07-05 PROCEDURE — 99233 SBSQ HOSP IP/OBS HIGH 50: CPT | Mod: 24 | Performed by: CLINICAL NURSE SPECIALIST

## 2021-07-05 PROCEDURE — 250N000013 HC RX MED GY IP 250 OP 250 PS 637

## 2021-07-05 PROCEDURE — 83735 ASSAY OF MAGNESIUM: CPT | Performed by: SURGERY

## 2021-07-05 PROCEDURE — 250N000011 HC RX IP 250 OP 636: Performed by: PHYSICIAN ASSISTANT

## 2021-07-05 PROCEDURE — 84100 ASSAY OF PHOSPHORUS: CPT | Performed by: SURGERY

## 2021-07-05 RX ORDER — METOCLOPRAMIDE 5 MG/1
5 TABLET ORAL
Status: DISCONTINUED | OUTPATIENT
Start: 2021-07-05 | End: 2021-07-05

## 2021-07-05 RX ORDER — PSYLLIUM SEED (WITH DEXTROSE)
2 POWDER (GRAM) ORAL DAILY
Qty: 10 PACKET | Refills: 0 | Status: SHIPPED | OUTPATIENT
Start: 2021-07-05 | End: 2021-07-15

## 2021-07-05 RX ORDER — LOPERAMIDE HYDROCHLORIDE 2 MG/1
2 TABLET ORAL 4 TIMES DAILY PRN
Qty: 50 TABLET | Refills: 0 | Status: SHIPPED | OUTPATIENT
Start: 2021-07-05

## 2021-07-05 RX ORDER — METOCLOPRAMIDE 10 MG/1
10 TABLET ORAL
Status: DISCONTINUED | OUTPATIENT
Start: 2021-07-05 | End: 2021-07-05 | Stop reason: HOSPADM

## 2021-07-05 RX ORDER — METOCLOPRAMIDE 10 MG/1
10 TABLET ORAL
Qty: 120 TABLET | Refills: 0 | Status: SHIPPED | OUTPATIENT
Start: 2021-07-05 | End: 2021-08-04

## 2021-07-05 RX ADMIN — PANTOPRAZOLE SODIUM 40 MG: 40 TABLET, DELAYED RELEASE ORAL at 07:42

## 2021-07-05 RX ADMIN — AMLODIPINE BESYLATE 10 MG: 10 TABLET ORAL at 07:42

## 2021-07-05 RX ADMIN — ATORVASTATIN CALCIUM 40 MG: 40 TABLET, FILM COATED ORAL at 07:42

## 2021-07-05 RX ADMIN — METOCLOPRAMIDE 10 MG: 10 TABLET ORAL at 07:42

## 2021-07-05 RX ADMIN — ENOXAPARIN SODIUM 40 MG: 40 INJECTION SUBCUTANEOUS at 10:44

## 2021-07-05 RX ADMIN — METOPROLOL TARTRATE 150 MG: 50 TABLET, FILM COATED ORAL at 07:42

## 2021-07-05 RX ADMIN — Medication 5 ML: at 08:30

## 2021-07-05 RX ADMIN — METOCLOPRAMIDE 10 MG: 10 TABLET ORAL at 10:43

## 2021-07-05 ASSESSMENT — ACTIVITIES OF DAILY LIVING (ADL)
ADLS_ACUITY_SCORE: 18

## 2021-07-05 NOTE — PLAN OF CARE
6/22 s/p transverse colectomy - complicated by postoperative ileus, now resolved.      AVSS.  99% RA.  A&Ox4.  Pt resting between cares.  Denies pain overnight, denies nausea.  Poor PO, on low fiber diet.  R PICC HLd.  02 BS check, covered per sliding scale.  Up with SBA to BR - void spont, loose stools continue - not saving.  Abd distended.  Abd midline + lap sites with dermabond, c/d/intact.   Possible discharge today.  Cont with POC.

## 2021-07-05 NOTE — PLAN OF CARE
VSS. Pt not wanting to eat today - states it is not because he has a poor appetite, but he just dislikes the food here. Discharge orders placed. PICC removed per order. Discharge instructions, new medications, and follow-up reviewed with patient and significant other, Marjorie. Kee teaching reviewed and handouts provided. Meds picked up from discharge pharmacy. Belongings sent home with patient. All questions answered.

## 2021-07-05 NOTE — PLAN OF CARE
PT: cancel- per RN pt preparing for discharge this PM. Will complete orders.     Physical Therapy Discharge Summary    Reason for therapy discharge:    Discharged to home.    Progress towards therapy goal(s). See goals on Care Plan in [facility] electronic health record for goal details.  Goals partially met.  Barriers to achieving goals:   discharge from facility.    Therapy recommendation(s):    No further therapy is recommended.

## 2021-07-05 NOTE — PLAN OF CARE
POD # 12 s/p Laparoscopic transverse colectomy, lysis of adhesions. Patient denies pain, no nausea. Poor PO intake, declined dinner. PICC line patent. IVF discontinued. Pt reports positive flatus, and +BM.  Voiding and ambulating without difficulty.

## 2021-07-05 NOTE — PLAN OF CARE
Occupational Therapy Discharge Summary    Reason for therapy discharge:    Discharged to home.    Progress towards therapy goal(s). See goals on Care Plan in [facility] electronic health record for goal details.  Goals partially met.  Barriers to achieving goals:   discharge from facility.    Therapy recommendation(s):    No further therapy is recommended.       L S spine no acute abnormality/reviewed by me

## 2021-07-05 NOTE — PROGRESS NOTES
Diabetes Consult Daily  Progress Note          Assessment/Plan:   HPI: Da is a 76 year old male with a history of HTN, CKD III, hx CAD s/p CABG, HLP, TIIDM, obesity, and history of colon cancer s/p lap right hemicolectomy with a new unresectable polyp who was admitted 6/22/21 for recurrent ileocolic bowel resection. Post op course has been complicated by recurrent ileus, required NGT placement and TPN for a time.        Assessment:   1) Type II Diabetes Mellitus, suboptimally controlled (A1c 8.8%), with post operative hyperglycemia    2) TPN-induced hyperglycemia     BG stable, slightly above target, in setting of poor PO intake  -glargine 22 units in AM  -aspart increased to 1 unit  Per 10 grams carb  - aspart correction increased to high resistance    **pt will not use aspart at home, so will be removed fromdischarge meds.      Discharge diabetes plan, pasted into AVS:  Diabetes Discharge Instructions    Glucose Control Regimen-- you plan to work with Dr. Lu to get glucose into target (no appt with Dr. Gregg will be requested)-- please check in with him this week.    BG monitoring: 3 times daily, before meals    Lantus 22 units every morning- last given 7/5 morning    For your diabetes pills:  Recommend you Start with adding back Glipizide IR 10mg once or twice daily with meals    When you restart Metformin IR 1000 mg twice daily with meals, monitor for change in stools.      **If you have urgent questions or concerns regarding your blood sugars or insulin, you may contact [phone] (the main hospital ). Ask to speak with the endocrinologist on call.    Your target A1c value is less than 7%. Your most recent A1c is 8.8%,   Goa is for BG to remain less than 180 at all times, to minimize the risk of postoperative infections and poor wound healing.     Thank you for letting the Diabetes Management Team be involved in your care!  -      Outpatient diabetes follow up:  declines follow up with Dr. Gregg, will work w/ Dr. Lu PCP  Plan discussed with patient, bedside RN, and primary team.           Interval History:     The last 24 hours progress and nursing notes reviewed.    Da expresses disappointment w/ the food in hospital.  Also that his BG is high and Blood pressure uncontrolled.  He is leaving today around 1300.  He Plans to get diabetes and hypertension under control working w/ his PCP.  Declines working w/ outpt endocrinology.  Does not want Novolog for home.  He reports the Lantus cost him 150.00.  He wants to use less insulin, more pills.  Gets all meds from a mail order pharm-- has what he needs at home.  No metformin or glipizide on board currently.  Having loose stools and eating very little.  He is Frustrated with how his BG was high after his bypass surgery and this admission.  He recalls taking up to 40 mg daily of glipizide.  Notes report 10 mg BID.  Discussed preference for glipizide added back first, due to current loose stool.  Though Da recalls tolerating metformin fine in the past.      Recent Labs   Lab 21  0748 21  0209 21  0117 21  2105 21  1715 21  1132 21  0608 21  0608 21  0742 21  0742 21  0711 21  0711 21  0804 21  0804 21  0722 21  0722   GLC  --   --   --   --   --   --   --  207*  --  204*  --  210*  --  188*  --  141*   * 170* 185* 199* 223* 231*   < >  --    < >  --    < >  --    < >  --    < >  --     < > = values in this interval not displayed.           Nutrition:     Orders Placed This Encounter      Diet      Low Fiber Diet            PTA Regimen:   BG monitorin-3 times daily  Metformin IR 1000 mg BID WM  Glipizide IR 10mg BID WM  Lantus 20 units daily HS            Review of Systems:   See interval hx          Medications:   NO steroid         Physical Exam:     Gen: Alert, in NAD , up in chair  HEENT: NC/AT, hearing slightly  "impaired  Resp: Unlabored  Neuro: oriented x3, communicating clearly, recall of meds is not exactly certain  Psych: calm mood, expressive  /60 (BP Location: Left arm)   Pulse 91   Temp 97.7  F (36.5  C) (Temporal)   Resp 16   Ht 1.727 m (5' 8\")   Wt 97.2 kg (214 lb 3.2 oz)   SpO2 93%   BMI 32.57 kg/m               Data:     Lab Results   Component Value Date    A1C 8.8 06/18/2021    A1C 8.9 05/05/2021          No results found for: HEBMP, [id number], CREAT      CBC RESULTS:   Recent Labs   Lab Test 07/04/21  0821 07/03/21  0608   WBC  --  8.8   RBC  --  2.76*   HGB  --  7.3*   HCT  --  23.6*   MCV  --  86   MCH  --  26.4*   MCHC  --  30.9*   RDW  --  15.6*    356     Recent Labs   Lab Test 07/04/21  0821 07/03/21  0608 07/02/21  0742   NA  --  136 136   POTASSIUM 3.8 4.0 3.8   CHLORIDE  --  108 109   CO2  --  20 21   ANIONGAP  --  8 6   GLC  --  207* 204*   BUN  --  27 24   CR  --  1.04 0.99   BOOGIE  --  7.8* 7.4*     Liver Function Studies -   Recent Labs   Lab Test 06/30/21  0804   PROTTOTAL 6.3*   ALBUMIN 2.6*   BILITOTAL 0.4   ALKPHOS 86   AST 12   ALT 19     Lab Results   Component Value Date    INR 1.15 07/05/2021    INR 1.12 06/30/2021     I spent a total of 35 minutes bedside and on the inpatient unit managing the glycemic care of Da Amaya. Over 50% of my time on the unit was spent counseling the patient  and/or coordinating care regarding acute and future diabetes management, recs for transition to orals, outpt follow up.  See note for details.            Rhiannon Capps APRN -[phone]  To contact Endocrine Diabetes service:   From 8AM-4PM: page inpatient diabetes provider that is following the patient that day (see filed or incomplete progress notes/consult notes).  If uncertain of provider assignment: page job code [phone].  For questions or updates from 4PM-8AM: page the diabetes job code for on call fellow: [phone]                    "

## 2021-07-05 NOTE — DISCHARGE INSTRUCTIONS
Diabetes Discharge Instructions    Glucose Control Regimen-- you plan to work with Dr. Lu to get glucose into target (no appt with Dr. Gregg will be requested)-- please check in with him this week.    BG monitoring: 3 times daily, before meals    Lantus 22 units every morning- last given 7/5 morning    For your diabetes pills:  Recommend you Start with adding back Glipizide IR 10mg once or twice daily with meals    When you restart Metformin IR 1000 mg twice daily with meals, monitor for change in stools.      **  If you have urgent questions or concerns regarding your blood sugars or insulin, you may contact [phone] (the main hospital ). Ask to speak with the endocrinologist on call.    Your target A1c value is less than 7%. Your most recent A1c is 8.8%,   Goa is for BG to remain less than 180 at all times, to minimize the risk of postoperative infections and poor wound healing.     Thank you for letting the Diabetes Management Team be involved in your care!

## 2021-07-07 ENCOUNTER — PATIENT OUTREACH (OUTPATIENT)
Dept: SURGERY | Facility: CLINIC | Age: 76
End: 2021-07-07

## 2021-07-07 NOTE — PROGRESS NOTES
Post Op Note     Called to check on patient postoperatively after hospital discharge.     Patient is s/p transverse colectomy with Dr. Oscar Muller for colon cancer.   Admitted 6/22/2021 and discharged on 7/5/2021.      No pain    Patient is eating and drinking normally. Patient is on a low fiber diet.  Encouraged patient to drink 8-10 glasses of water a day. Appetite is back and he is eating small meals.     Patient is passing flats, is having watery bowel movements. Patient is having 8 stools in a day. He is taking 4 pills of imodium and will increase to a max of 8 pills if needed. He is also taking fiber twice a day.      Patient is voiding normally and urine is light in color.    Patient is not set up with home care.     Patient Denies nausea and vomiting.    Patient Denies any fevers or chills.    Patient's incision is C/D/I. Patient reminded NOT to remove any dressings over their incisions.     Patient is on a activity restriction. Lifting 10 pounds for 6 weeks.     Patient Denies needing any forms completed.     Follow up is set up with Dr. Oscar Muller on 8/11/2021. He does not want another visit with our NP or PA.   Encouraged the patient to contact the clinic in the meantime with any fevers, chills, nausea, vomiting, increased colostomy output, no colostomy output, dizziness, lightheadedness, uncontrolled pain, changes to the incisions, or with any questions or concerns.    Patient's questions were answered to their stated satisfaction and they are in agreement with this plan.    EZIO Kimble [phone]  Colon & Rectal Surgery Clinic  [facility]

## 2021-07-13 ENCOUNTER — PRE VISIT (OUTPATIENT)
Dept: ONCOLOGY | Facility: CLINIC | Age: 76
End: 2021-07-13

## 2021-08-09 NOTE — PROGRESS NOTES
Colon and Rectal Surgery Postoperative Clinic Note     Referring provider:  Oscar Muller MD  [address] [place] [id number]       RE: Da Amaya  : 1945  AMILCAR: 2021      Da Amaya is a 76 year old male s/p (21):  PROCEDURE:  1. Laparoscopic transverse colectomy  2. Extensive lysis of adhesions, requiring >90 minutes.  3. Takedown of splenic flexure  4. Intraoperative indocyanine green testing  5. Intraoperative colonoscopy    FINAL DIAGNOSIS:   Ileocolic Anastomosis, Transverse Colon and Omentum; Resection: Tubular adenoma (size 2.7 x 1.5 x 0.5 cm); negative for high grade dysplasia:    -Proximal and distal margins uninvolved by adenoma or other abnormality    -Omental tissue with no significant histologic are unremarkable    -Foreign body giant cell reaction (tattoo ink) identified    -Nine reactive lymph nodes, negative for malignancy     -His course was complicated by prolonged ileus.     Interval history: Doing well. Intermittent constipation diarrhea.       PLEASE SEE NOTE BELOW FOR PHYSICAL EXAMINATION, REVIEW OF SYSTEMS, AND OTHER HISTORY.    Assessment/Plan:  76 year old male s/p laparoscopic transverse colectomy for unresectable polyp after recent right hemicolectomy for colon cancer, remains on an appropriate postop course.   -Follow up as needed, ok to continue surveillance.   -Per NCCN guidelines for colon cancer surveillance monitorin. History, physical exam, and CEA every 3-6 months for 2 years, then q6 months until 5 years. Next    2. Colonoscopy after one year, then three years thereafter if negative. Next    3. CT CAP q6-12 months for 5 years. Due by       PLEASE SEE NOTE BELOW FOR PHYSICAL EXAMINATION, REVIEW OF SYSTEMS, AND OTHER HISTORY.    Oscar Muller MD  Division of Colon and Rectal Surgery   [facility]  [place]  [id number]    -------------------------------------------------------------------------------------------------------------------    Medical history:  Past Medical History:   Diagnosis Date     BPH (benign prostatic hyperplasia)      Chronic bronchitis (H)      Colon cancer (H)      Coronary artery disease      Diabetes (H)      Heart attack (H)      Hypertension      Hypokalemia 7/1/2021    Treated with potassium replacement protocol.      Renal disease        Surgical history:  Past Surgical History:   Procedure Laterality Date     COLON SURGERY       COLONOSCOPY       CORONARY ARTERY BYPASS       LAPAROSCOPIC ASSISTED COLECTOMY N/A 6/22/2021    Procedure: LAPAROSCOPIC transverse colectomy, lysis of adhesions;  Surgeon: Oscar Muller MD;  Location:  OR       Problem list:  Patient Active Problem List    Diagnosis Date Noted     Colon cancer (H) 06/07/2021     Priority: Medium     Added automatically from request for surgery [id number]       Diabetes mellitus, type 2 (H) 06/02/2021     Priority: Medium       Medications:  Current Outpatient Medications   Medication Sig Dispense Refill     acetaminophen (TYLENOL) 325 MG tablet Take 3 tablets (975 mg) by mouth every 6 hours       amLODIPine (NORVASC) 10 MG tablet Take 1 tablet (10 mg) by mouth daily 30 tablet 0     atorvastatin (LIPITOR) 40 MG tablet Take 40 mg by mouth daily        glipiZIDE (GLUCOTROL) 10 MG tablet Take 10 mg by mouth 2 times daily (before meals)        insulin glargine (LANTUS SOLOSTAR) 100 UNIT/ML pen Inject 22 Units Subcutaneous every morning 9 mL 1     insulin pen needle (BD MIAN U/F) 32G X 4 MM miscellaneous Use 1 pen needles daily or as directed. 45 each 1     loperamide (IMODIUM A-D) 2 MG tablet Take 1 tablet (2 mg) by mouth 4 times daily as needed for diarrhea 50 tablet 0     losartan-hydrochlorothiazide (HYZAAR) 100-25 MG tablet Take 1 tablet by mouth every evening        metFORMIN (GLUCOPHAGE) 1000 MG tablet TAKE ONE TABLET BY MOUTH  TWICE DAILY WITH MEALS 60 tablet 0     metoprolol succinate ER (TOPROL-XL) 200 MG 24 hr tablet Take 200 mg by mouth daily        pantoprazole (PROTONIX) 40 MG EC tablet Take 40 mg by mouth daily        tamsulosin (FLOMAX) 0.4 MG capsule Take 0.4 mg by mouth daily          Allergies:  Allergies   Allergen Reactions     Neosporin [Neomycin-Polymyx-Gramicid] Rash and Blisters       Family history:  No family history on file.    Social history:  Social History     Socioeconomic History     Marital status: Single     Spouse name: Not on file     Number of children: Not on file     Years of education: Not on file     Highest education level: Not on file   Occupational History     Not on file   Tobacco Use     Smoking status: Former Smoker     Quit date:      Years since quittin.6     Smokeless tobacco: Never Used   Substance and Sexual Activity     Alcohol use: Yes     Drug use: Never     Sexual activity: Not on file   Other Topics Concern     Not on file   Social History Narrative     Not on file     Social Determinants of Health     Financial Resource Strain:      Difficulty of Paying Living Expenses:    Food Insecurity:      Worried About Running Out of Food in the Last Year:      Ran Out of Food in the Last Year:    Transportation Needs:      Lack of Transportation (Medical):      Lack of Transportation (Non-Medical):    Physical Activity:      Days of Exercise per Week:      Minutes of Exercise per Session:    Stress:      Feeling of Stress :    Social Connections:      Frequency of Communication with Friends and Family:      Frequency of Social Gatherings with Friends and Family:      Attends [employer] Services:      Active Member of Clubs or Organizations:      Attends Club or Organization Meetings:      Marital Status:    Intimate Partner Violence:      Fear of Current or Ex-Partner:      Emotionally Abused:      Physically Abused:      Sexually Abused:        Physical Examination:  /68 (BP  "Location: Left arm, Patient Position: Sitting, Cuff Size: Adult Regular)   Pulse 68   Ht 1.727 m (5' 8\")   Wt 85.8 kg (189 lb 1.6 oz)   SpO2 98%   BMI 28.75 kg/m    General: NAD  Abdomen: soft, NTND, incision well healed, no hernia  Perianal external examination:  deferred  "

## 2021-08-11 ENCOUNTER — OFFICE VISIT (OUTPATIENT)
Dept: SURGERY | Facility: CLINIC | Age: 76
End: 2021-08-11
Payer: MEDICARE

## 2021-08-11 VITALS
HEIGHT: 68 IN | WEIGHT: 189.1 LBS | BODY MASS INDEX: 28.66 KG/M2 | SYSTOLIC BLOOD PRESSURE: 127 MMHG | DIASTOLIC BLOOD PRESSURE: 68 MMHG | HEART RATE: 68 BPM | OXYGEN SATURATION: 98 %

## 2021-08-11 DIAGNOSIS — C18.2 MALIGNANT NEOPLASM OF ASCENDING COLON (H): Primary | ICD-10-CM

## 2021-08-11 PROCEDURE — 99024 POSTOP FOLLOW-UP VISIT: CPT | Performed by: SURGERY

## 2021-08-11 ASSESSMENT — MIFFLIN-ST. JEOR: SCORE: 1572.25

## 2021-08-11 ASSESSMENT — PAIN SCALES - GENERAL: PAINLEVEL: NO PAIN (0)

## 2021-08-11 NOTE — NURSING NOTE
"Chief Complaint   Patient presents with     Post-op Visit       Vitals:    08/11/21 1135   BP: 127/68   BP Location: Left arm   Patient Position: Sitting   Cuff Size: Adult Regular   Pulse: 68   SpO2: 98%   Weight: 189 lb 1.6 oz   Height: 5' 8\"       Body mass index is 28.75 kg/m .    Destiny Urrutia CMA    "

## 2021-08-11 NOTE — LETTER
2021       RE: Da Amaya  [address]  [address] [facility] [address]     Dear Colleague,    Thank you for referring your patient, Da Amaya, to the [facility] [place] at [facility]. Please see a copy of my visit note below.    Colon and Rectal Surgery Postoperative Clinic Note     Referring provider:  Oscar Muller MD  [address] [place] [id number]       RE: Da Amaya  : 1945  AMILCAR: 2021      Da Amaya is a 76 year old male s/p (21):  PROCEDURE:  1. Laparoscopic transverse colectomy  2. Extensive lysis of adhesions, requiring >90 minutes.  3. Takedown of splenic flexure  4. Intraoperative indocyanine green testing  5. Intraoperative colonoscopy    FINAL DIAGNOSIS:   Ileocolic Anastomosis, Transverse Colon and Omentum; Resection: Tubular adenoma (size 2.7 x 1.5 x 0.5 cm); negative for high grade dysplasia:    -Proximal and distal margins uninvolved by adenoma or other abnormality    -Omental tissue with no significant histologic are unremarkable    -Foreign body giant cell reaction (tattoo ink) identified    -Nine reactive lymph nodes, negative for malignancy     -His course was complicated by prolonged ileus.     Interval history: Doing well. Intermittent constipation diarrhea.       PLEASE SEE NOTE BELOW FOR PHYSICAL EXAMINATION, REVIEW OF SYSTEMS, AND OTHER HISTORY.    Assessment/Plan:  76 year old male s/p laparoscopic transverse colectomy for unresectable polyp after recent right hemicolectomy for colon cancer, remains on an appropriate postop course.   -Follow up as needed, ok to continue surveillance.   -Per NCCN guidelines for colon cancer surveillance monitorin. History, physical exam, and CEA every 3-6 months for 2 years, then q6 months until 5 years. Next    2. Colonoscopy after one year, then three years thereafter if negative. Next   '22   3. CT CAP q6-12 months for 5 years. Due by April '22      PLEASE SEE NOTE BELOW FOR PHYSICAL EXAMINATION, REVIEW OF SYSTEMS, AND OTHER HISTORY.    Oscar Muller MD  Division of Colon and Rectal Surgery   [facility]  [id number]    -------------------------------------------------------------------------------------------------------------------    Medical history:  Past Medical History:   Diagnosis Date     BPH (benign prostatic hyperplasia)      Chronic bronchitis (H)      Colon cancer (H)      Coronary artery disease      Diabetes (H)      Heart attack (H)      Hypertension      Hypokalemia 7/1/2021    Treated with potassium replacement protocol.      Renal disease        Surgical history:  Past Surgical History:   Procedure Laterality Date     COLON SURGERY       COLONOSCOPY       CORONARY ARTERY BYPASS       LAPAROSCOPIC ASSISTED COLECTOMY N/A 6/22/2021    Procedure: LAPAROSCOPIC transverse colectomy, lysis of adhesions;  Surgeon: Oscar Muller MD;  Location: [facility] OR       Problem list:  Patient Active Problem List    Diagnosis Date Noted     Colon cancer (H) 06/07/2021     Priority: Medium     Added automatically from request for surgery [id number]       Diabetes mellitus, type 2 (H) 06/02/2021     Priority: Medium       Medications:  Current Outpatient Medications   Medication Sig Dispense Refill     acetaminophen (TYLENOL) 325 MG tablet Take 3 tablets (975 mg) by mouth every 6 hours       amLODIPine (NORVASC) 10 MG tablet Take 1 tablet (10 mg) by mouth daily 30 tablet 0     atorvastatin (LIPITOR) 40 MG tablet Take 40 mg by mouth daily        glipiZIDE (GLUCOTROL) 10 MG tablet Take 10 mg by mouth 2 times daily (before meals)        insulin glargine (LANTUS SOLOSTAR) 100 UNIT/ML pen Inject 22 Units Subcutaneous every morning 9 mL 1     insulin pen needle (BD MIAN U/F) 32G X 4 MM miscellaneous Use 1 pen needles daily or as directed. 45 each 1     loperamide (IMODIUM A-D) 2 MG tablet  Take 1 tablet (2 mg) by mouth 4 times daily as needed for diarrhea 50 tablet 0     losartan-hydrochlorothiazide (HYZAAR) 100-25 MG tablet Take 1 tablet by mouth every evening        metFORMIN (GLUCOPHAGE) 1000 MG tablet TAKE ONE TABLET BY MOUTH TWICE DAILY WITH MEALS 60 tablet 0     metoprolol succinate ER (TOPROL-XL) 200 MG 24 hr tablet Take 200 mg by mouth daily        pantoprazole (PROTONIX) 40 MG EC tablet Take 40 mg by mouth daily        tamsulosin (FLOMAX) 0.4 MG capsule Take 0.4 mg by mouth daily          Allergies:  Allergies   Allergen Reactions     Neosporin [Neomycin-Polymyx-Gramicid] Rash and Blisters       Family history:  No family history on file.    Social history:  Social History     Socioeconomic History     Marital status: Single     Spouse name: Not on file     Number of children: Not on file     Years of education: Not on file     Highest education level: Not on file   Occupational History     Not on file   Tobacco Use     Smoking status: Former Smoker     Quit date:      Years since quittin.6     Smokeless tobacco: Never Used   Substance and Sexual Activity     Alcohol use: Yes     Drug use: Never     Sexual activity: Not on file   Other Topics Concern     Not on file   Social History Narrative     Not on file     Social Determinants of Health     Financial Resource Strain:      Difficulty of Paying Living Expenses:    Food Insecurity:      Worried About Running Out of Food in the Last Year:      Ran Out of Food in the Last Year:    Transportation Needs:      Lack of Transportation (Medical):      Lack of Transportation (Non-Medical):    Physical Activity:      Days of Exercise per Week:      Minutes of Exercise per Session:    Stress:      Feeling of Stress :    Social Connections:      Frequency of Communication with Friends and Family:      Frequency of Social Gatherings with Friends and Family:      Attends [employer] Services:      Active Member of Clubs or Organizations:       "Attends Club or Organization Meetings:      Marital Status:    Intimate Partner Violence:      Fear of Current or Ex-Partner:      Emotionally Abused:      Physically Abused:      Sexually Abused:        Physical Examination:  /68 (BP Location: Left arm, Patient Position: Sitting, Cuff Size: Adult Regular)   Pulse 68   Ht 1.727 m (5' 8\")   Wt 85.8 kg (189 lb 1.6 oz)   SpO2 98%   BMI 28.75 kg/m    General: NAD  Abdomen: soft, NTND, incision well healed, no hernia  Perianal external examination:  deferred      Again, thank you for allowing me to participate in the care of your patient.      Sincerely,    Oscar Muller MD, MD      "

## 2025-05-29 NOTE — NURSING NOTE
"Chief Complaint   Patient presents with     New Patient     New consult for polyp of ascending colon       Vitals:    06/02/21 1231   BP: (!) 153/81   BP Location: Left arm   Patient Position: Sitting   Cuff Size: Adult Regular   Pulse: 60   Temp: 97.8  F (36.6  C)   TempSrc: Oral   SpO2: 97%   Weight: 210 lb 1.6 oz   Height: 5' 8\"       Body mass index is 31.95 kg/m .          Alia Melendez CMA    "
36.1

## (undated) DEVICE — KIT PATIENT POSITIONING PIGAZZI LATEX FREE 40580

## (undated) DEVICE — STPL RELOAD LINEAR CUT 75MM TCR75

## (undated) DEVICE — SU PDS II 4-0 RB-1 27" Z304H

## (undated) DEVICE — SU VICRYL 0 UR-6 27" J603H

## (undated) DEVICE — ENDO TROCAR BLUNT TIP KII BALLOON 12X100MM C0R47

## (undated) DEVICE — Device

## (undated) DEVICE — SOL WATER IRRIG 1000ML BOTTLE 2F7114

## (undated) DEVICE — SU MONOCRYL 4-0 PS-2 27" UND Y426H

## (undated) DEVICE — PREP POVIDONE IODINE SOLUTION 10% 4OZ

## (undated) DEVICE — SU PDS II 0 CT-1 27" Z340H

## (undated) DEVICE — ENDO TROCAR FIRST ENTRY KII FIOS Z-THRD 05X100MM CTF03

## (undated) DEVICE — SOL WATER IRRIG 3000ML BAG 2B7117

## (undated) DEVICE — SYR EAR BULB 3OZ 0035830

## (undated) DEVICE — LINEN TOWEL PACK X6 WHITE 5487

## (undated) DEVICE — SUCTION IRR STRYKERFLOW II W/TIP 250-070-520

## (undated) DEVICE — CATH TRAY FOLEY SURESTEP 16FR W/URNE MTR STLK LATEX A303316A

## (undated) DEVICE — SUCTION MANIFOLD NEPTUNE 2 SYS 4 PORT 0702-020-000

## (undated) DEVICE — SU VICRYL 2-0 SH 27" UND J417H

## (undated) DEVICE — ENDO CAP AND TUBING STERILE FOR ENDOGATOR  100130

## (undated) DEVICE — ESU LIGASURE MARYLAND LAPAROSCOPIC SLR/DVDR 5MMX37CM LF1937

## (undated) DEVICE — LINEN TOWEL PACK X30 5481

## (undated) DEVICE — KIT CONNECTOR FOR OLYMPUS ENDOSCOPES DEFENDO 100310

## (undated) DEVICE — ESU LIGASURE IMPACT OPEN SEALER/DVDR CVD LG JAW LF4418

## (undated) DEVICE — ESU ENDO SCISSORS 5MM CVD 5DCS

## (undated) DEVICE — ENDO TROCAR SLEEVE KII Z-THREADED 05X100MM CTS02

## (undated) DEVICE — STPL LINEAR CUT 75MM TLC75

## (undated) DEVICE — STPL LINEAR 90 X 3.5MM TA9035S

## (undated) DEVICE — GLOVE PROTEXIS W/NEU-THERA 7.0  2D73TE70

## (undated) DEVICE — SPONGE LAP 18X18" X8435

## (undated) DEVICE — SYSTEM CLEARIFY VISUALIZATION 21-345

## (undated) DEVICE — ESU GROUND PAD ADULT W/CORD E7507

## (undated) DEVICE — ADH SKIN CLOSURE PREMIERPRO EXOFIN 1.0ML 3470

## (undated) DEVICE — PREP CHLORAPREP 26ML TINTED ORANGE  260815

## (undated) DEVICE — PROTECTOR ARM ONE-STEP TRENDELENBURG 40418

## (undated) DEVICE — ENDO SYSTEM WATER BOTTLE & TUBING W/CO2 FILTER 00711549

## (undated) DEVICE — TUBING INSUFFLATION W/FILTER CPC TO LUER 620-030-301

## (undated) DEVICE — DRAPE LEGGINGS CLEAR 8430

## (undated) DEVICE — SU PDS II 3-0 SH 27" Z316H

## (undated) DEVICE — DRAPE IOBAN INCISE 23X17" 6650EZ

## (undated) RX ORDER — SODIUM CHLORIDE, SODIUM LACTATE, POTASSIUM CHLORIDE, CALCIUM CHLORIDE 600; 310; 30; 20 MG/100ML; MG/100ML; MG/100ML; MG/100ML
INJECTION, SOLUTION INTRAVENOUS
Status: DISPENSED
Start: 2021-06-22

## (undated) RX ORDER — PROPOFOL 10 MG/ML
INJECTION, EMULSION INTRAVENOUS
Status: DISPENSED
Start: 2021-06-22

## (undated) RX ORDER — FENTANYL CITRATE 50 UG/ML
INJECTION, SOLUTION INTRAMUSCULAR; INTRAVENOUS
Status: DISPENSED
Start: 2021-06-22

## (undated) RX ORDER — ALBUMIN, HUMAN INJ 5% 5 %
SOLUTION INTRAVENOUS
Status: DISPENSED
Start: 2021-06-22

## (undated) RX ORDER — HYDROMORPHONE HYDROCHLORIDE 1 MG/ML
INJECTION, SOLUTION INTRAMUSCULAR; INTRAVENOUS; SUBCUTANEOUS
Status: DISPENSED
Start: 2021-06-22

## (undated) RX ORDER — DEXAMETHASONE SODIUM PHOSPHATE 4 MG/ML
INJECTION, SOLUTION INTRA-ARTICULAR; INTRALESIONAL; INTRAMUSCULAR; INTRAVENOUS; SOFT TISSUE
Status: DISPENSED
Start: 2021-06-22

## (undated) RX ORDER — GABAPENTIN 100 MG/1
CAPSULE ORAL
Status: DISPENSED
Start: 2021-06-22

## (undated) RX ORDER — LIDOCAINE HYDROCHLORIDE 20 MG/ML
INJECTION, SOLUTION EPIDURAL; INFILTRATION; INTRACAUDAL; PERINEURAL
Status: DISPENSED
Start: 2021-06-22

## (undated) RX ORDER — ONDANSETRON 2 MG/ML
INJECTION INTRAMUSCULAR; INTRAVENOUS
Status: DISPENSED
Start: 2021-06-22

## (undated) RX ORDER — ACETAMINOPHEN 325 MG/1
TABLET ORAL
Status: DISPENSED
Start: 2021-06-22

## (undated) RX ORDER — INDOCYANINE GREEN AND WATER 25 MG
KIT INJECTION
Status: DISPENSED
Start: 2021-06-22

## (undated) RX ORDER — EPHEDRINE SULFATE 50 MG/ML
INJECTION, SOLUTION INTRAMUSCULAR; INTRAVENOUS; SUBCUTANEOUS
Status: DISPENSED
Start: 2021-06-22

## (undated) RX ORDER — CEFAZOLIN SODIUM 2 G/100ML
INJECTION, SOLUTION INTRAVENOUS
Status: DISPENSED
Start: 2021-06-22